# Patient Record
Sex: FEMALE | Race: WHITE | NOT HISPANIC OR LATINO
[De-identification: names, ages, dates, MRNs, and addresses within clinical notes are randomized per-mention and may not be internally consistent; named-entity substitution may affect disease eponyms.]

---

## 2017-01-22 ENCOUNTER — RX RENEWAL (OUTPATIENT)
Age: 72
End: 2017-01-22

## 2017-10-25 ENCOUNTER — MEDICATION RENEWAL (OUTPATIENT)
Age: 72
End: 2017-10-25

## 2018-01-19 ENCOUNTER — APPOINTMENT (OUTPATIENT)
Dept: INTERNAL MEDICINE | Facility: CLINIC | Age: 73
End: 2018-01-19
Payer: MEDICARE

## 2018-01-19 ENCOUNTER — LABORATORY RESULT (OUTPATIENT)
Age: 73
End: 2018-01-19

## 2018-01-19 ENCOUNTER — NON-APPOINTMENT (OUTPATIENT)
Age: 73
End: 2018-01-19

## 2018-01-19 VITALS
TEMPERATURE: 98.4 F | HEART RATE: 86 BPM | HEIGHT: 63 IN | DIASTOLIC BLOOD PRESSURE: 82 MMHG | OXYGEN SATURATION: 91 % | WEIGHT: 222 LBS | SYSTOLIC BLOOD PRESSURE: 133 MMHG | BODY MASS INDEX: 39.34 KG/M2

## 2018-01-19 PROCEDURE — G0439: CPT

## 2018-01-19 PROCEDURE — 99214 OFFICE O/P EST MOD 30 MIN: CPT | Mod: 25

## 2018-01-19 PROCEDURE — 93000 ELECTROCARDIOGRAM COMPLETE: CPT

## 2018-01-19 PROCEDURE — 36415 COLL VENOUS BLD VENIPUNCTURE: CPT

## 2018-01-20 LAB
25(OH)D3 SERPL-MCNC: 35.6 NG/ML
BASOPHILS # BLD AUTO: 0.07 K/UL
BASOPHILS NFR BLD AUTO: 1.1 %
CHOLEST SERPL-MCNC: 188 MG/DL
CHOLEST/HDLC SERPL: 3.4 RATIO
EOSINOPHIL # BLD AUTO: 0.28 K/UL
EOSINOPHIL NFR BLD AUTO: 4.3 %
HBA1C MFR BLD HPLC: 6.1 %
HCT VFR BLD CALC: 42.7 %
HDLC SERPL-MCNC: 55 MG/DL
HGB BLD-MCNC: 13.5 G/DL
IMM GRANULOCYTES NFR BLD AUTO: 0.6 %
LDLC SERPL CALC-MCNC: 103 MG/DL
LYMPHOCYTES # BLD AUTO: 2.03 K/UL
LYMPHOCYTES NFR BLD AUTO: 31 %
MAN DIFF?: NORMAL
MCHC RBC-ENTMCNC: 31.3 PG
MCHC RBC-ENTMCNC: 31.6 GM/DL
MCV RBC AUTO: 99.1 FL
MONOCYTES # BLD AUTO: 0.53 K/UL
MONOCYTES NFR BLD AUTO: 8.1 %
NEUTROPHILS # BLD AUTO: 3.6 K/UL
NEUTROPHILS NFR BLD AUTO: 54.9 %
PLATELET # BLD AUTO: 295 K/UL
RBC # BLD: 4.31 M/UL
RBC # FLD: 14 %
T4 SERPL-MCNC: 9.1 UG/DL
TRIGL SERPL-MCNC: 148 MG/DL
TSH SERPL-ACNC: 2.5 UIU/ML
WBC # FLD AUTO: 6.55 K/UL

## 2018-01-21 LAB
NT-PROBNP SERPL-MCNC: 77 PG/ML
URATE SERPL-MCNC: 7.4 MG/DL

## 2018-07-10 ENCOUNTER — MEDICATION RENEWAL (OUTPATIENT)
Age: 73
End: 2018-07-10

## 2018-07-18 ENCOUNTER — LABORATORY RESULT (OUTPATIENT)
Age: 73
End: 2018-07-18

## 2018-07-18 ENCOUNTER — APPOINTMENT (OUTPATIENT)
Dept: INTERNAL MEDICINE | Facility: CLINIC | Age: 73
End: 2018-07-18
Payer: MEDICARE

## 2018-07-18 VITALS
RESPIRATION RATE: 16 BRPM | OXYGEN SATURATION: 95 % | HEART RATE: 103 BPM | WEIGHT: 221 LBS | TEMPERATURE: 98.6 F | DIASTOLIC BLOOD PRESSURE: 83 MMHG | SYSTOLIC BLOOD PRESSURE: 143 MMHG | HEIGHT: 63 IN | BODY MASS INDEX: 39.16 KG/M2

## 2018-07-18 DIAGNOSIS — M17.0 BILATERAL PRIMARY OSTEOARTHRITIS OF KNEE: ICD-10-CM

## 2018-07-18 PROCEDURE — 99214 OFFICE O/P EST MOD 30 MIN: CPT | Mod: 25

## 2018-07-18 PROCEDURE — 36415 COLL VENOUS BLD VENIPUNCTURE: CPT

## 2018-07-18 NOTE — PLAN
[FreeTextEntry1] : Possible inflammatory arthitis/arthalgia??  prednisone 20 and blood work up\par Possible cellulitis  bactrim\par \par eventual rheum

## 2018-07-18 NOTE — PHYSICAL EXAM
[No Acute Distress] : no acute distress [No JVD] : no jugular venous distention [No Respiratory Distress] : no respiratory distress  [Clear to Auscultation] : lungs were clear to auscultation bilaterally [Normal Rate] : normal rate  [Regular Rhythm] : with a regular rhythm [Soft] : abdomen soft [de-identified] : multiple joints swollen  left ankle swolen and red

## 2018-07-18 NOTE — HISTORY OF PRESENT ILLNESS
[FreeTextEntry1] : Arthalgia [de-identified] : Migratory arthalgia\par legs and ankle swollen left greater than right\par lef ankle red\par minimal help with indocin

## 2018-07-20 LAB
25(OH)D3 SERPL-MCNC: 34.8 NG/ML
B BURGDOR IGG+IGM SER QL IB: NORMAL
BASOPHILS # BLD AUTO: 0.05 K/UL
BASOPHILS NFR BLD AUTO: 0.6 %
CHOLEST SERPL-MCNC: 146 MG/DL
CHOLEST/HDLC SERPL: 2.9 RATIO
CRP SERPL-MCNC: 11.02 MG/DL
EOSINOPHIL # BLD AUTO: 0.3 K/UL
EOSINOPHIL NFR BLD AUTO: 3.8 %
ERYTHROCYTE [SEDIMENTATION RATE] IN BLOOD BY WESTERGREN METHOD: 51 MM/HR
HBA1C MFR BLD HPLC: 6.3 %
HCT VFR BLD CALC: 39.5 %
HDLC SERPL-MCNC: 51 MG/DL
HGB BLD-MCNC: 12.9 G/DL
IMM GRANULOCYTES NFR BLD AUTO: 0.6 %
LDLC SERPL CALC-MCNC: 80 MG/DL
LYMPHOCYTES # BLD AUTO: 1.49 K/UL
LYMPHOCYTES NFR BLD AUTO: 18.8 %
MAN DIFF?: NORMAL
MCHC RBC-ENTMCNC: 30.9 PG
MCHC RBC-ENTMCNC: 32.7 GM/DL
MCV RBC AUTO: 94.7 FL
MONOCYTES # BLD AUTO: 0.8 K/UL
MONOCYTES NFR BLD AUTO: 10.1 %
NEUTROPHILS # BLD AUTO: 5.25 K/UL
NEUTROPHILS NFR BLD AUTO: 66.1 %
PLATELET # BLD AUTO: 339 K/UL
RBC # BLD: 4.17 M/UL
RBC # FLD: 13.3 %
RHEUMATOID FACT SER QL: 76 IU/ML
T4 SERPL-MCNC: 9.7 UG/DL
TRIGL SERPL-MCNC: 76 MG/DL
TSH SERPL-ACNC: 2.98 UIU/ML
URATE SERPL-MCNC: 6.2 MG/DL
WBC # FLD AUTO: 7.94 K/UL

## 2018-07-23 LAB
DSDNA AB SER-ACNC: 53 IU/ML
HLA-B27 RELATED AG QL: NORMAL

## 2018-07-25 LAB
ANA PAT FLD IF-IMP: ABNORMAL
ANA PATTERN: ABNORMAL
ANA SER IF-ACNC: ABNORMAL
ANA TITER: ABNORMAL

## 2018-08-03 ENCOUNTER — APPOINTMENT (OUTPATIENT)
Dept: RHEUMATOLOGY | Facility: CLINIC | Age: 73
End: 2018-08-03
Payer: MEDICARE

## 2018-08-03 VITALS
HEIGHT: 63 IN | WEIGHT: 214 LBS | OXYGEN SATURATION: 95 % | SYSTOLIC BLOOD PRESSURE: 135 MMHG | BODY MASS INDEX: 37.92 KG/M2 | HEART RATE: 83 BPM | DIASTOLIC BLOOD PRESSURE: 80 MMHG | TEMPERATURE: 98.3 F

## 2018-08-03 LAB
DEPRECATED KAPPA LC FREE/LAMBDA SER: 1.3 RATIO
ERYTHROCYTE [SEDIMENTATION RATE] IN BLOOD BY WESTERGREN METHOD: 33 MM/HR
IGA SER QL IEP: 355 MG/DL
IGG SER QL IEP: 1176 MG/DL
IGM SER QL IEP: 84 MG/DL
KAPPA LC CSF-MCNC: 1.51 MG/DL
KAPPA LC SERPL-MCNC: 1.97 MG/DL

## 2018-08-03 PROCEDURE — 99205 OFFICE O/P NEW HI 60 MIN: CPT

## 2018-08-04 LAB
CK SERPL-CCNC: 84 U/L
ENA RNP AB SER IA-ACNC: 0.2 AL
ENA SM AB SER IA-ACNC: <0.2 AL
ENA SS-A AB SER IA-ACNC: <0.2 AL
ENA SS-B AB SER IA-ACNC: <0.2 AL
M PROTEIN SPEC IFE-MCNC: NORMAL

## 2018-08-06 LAB
CCP AB SER IA-ACNC: >250 UNITS
RF+CCP IGG SER-IMP: ABNORMAL

## 2018-08-07 LAB
B19V IGG SER QL IA: 5.6 INDEX
B19V IGG+IGM SER-IMP: NORMAL
B19V IGG+IGM SER-IMP: POSITIVE
B19V IGM FLD-ACNC: 0.1 INDEX
B19V IGM SER-ACNC: NEGATIVE

## 2018-08-13 ENCOUNTER — APPOINTMENT (OUTPATIENT)
Dept: RHEUMATOLOGY | Facility: CLINIC | Age: 73
End: 2018-08-13
Payer: MEDICARE

## 2018-08-13 VITALS
SYSTOLIC BLOOD PRESSURE: 124 MMHG | OXYGEN SATURATION: 96 % | RESPIRATION RATE: 16 BRPM | HEART RATE: 111 BPM | DIASTOLIC BLOOD PRESSURE: 80 MMHG | BODY MASS INDEX: 38.27 KG/M2 | WEIGHT: 216 LBS | HEIGHT: 63 IN

## 2018-08-13 PROCEDURE — 99214 OFFICE O/P EST MOD 30 MIN: CPT

## 2018-08-13 RX ORDER — INDOMETHACIN 25 MG/1
25 CAPSULE ORAL 3 TIMES DAILY
Qty: 30 | Refills: 0 | Status: DISCONTINUED | COMMUNITY
Start: 2018-07-10 | End: 2018-08-13

## 2018-08-13 RX ORDER — SULFAMETHOXAZOLE AND TRIMETHOPRIM 800; 160 MG/1; MG/1
800-160 TABLET ORAL
Qty: 14 | Refills: 1 | Status: DISCONTINUED | COMMUNITY
Start: 2018-07-18 | End: 2018-08-13

## 2018-08-13 RX ORDER — PREDNISONE 20 MG/1
20 TABLET ORAL DAILY
Qty: 14 | Refills: 0 | Status: DISCONTINUED | COMMUNITY
Start: 2018-07-18 | End: 2018-08-13

## 2018-08-21 ENCOUNTER — MEDICATION RENEWAL (OUTPATIENT)
Age: 73
End: 2018-08-21

## 2018-09-11 ENCOUNTER — APPOINTMENT (OUTPATIENT)
Dept: RHEUMATOLOGY | Facility: CLINIC | Age: 73
End: 2018-09-11
Payer: MEDICARE

## 2018-09-11 VITALS
HEIGHT: 63 IN | OXYGEN SATURATION: 95 % | HEART RATE: 134 BPM | RESPIRATION RATE: 17 BRPM | BODY MASS INDEX: 38.27 KG/M2 | SYSTOLIC BLOOD PRESSURE: 135 MMHG | WEIGHT: 216 LBS | DIASTOLIC BLOOD PRESSURE: 81 MMHG

## 2018-09-11 LAB
BASOPHILS # BLD AUTO: 0.03 K/UL
BASOPHILS NFR BLD AUTO: 0.3 %
EOSINOPHIL # BLD AUTO: 0.08 K/UL
EOSINOPHIL NFR BLD AUTO: 0.8 %
HCT VFR BLD CALC: 42.3 %
HGB BLD-MCNC: 13.8 G/DL
IMM GRANULOCYTES NFR BLD AUTO: 0.7 %
LYMPHOCYTES # BLD AUTO: 1.49 K/UL
LYMPHOCYTES NFR BLD AUTO: 15.5 %
MAN DIFF?: NORMAL
MCHC RBC-ENTMCNC: 31.4 PG
MCHC RBC-ENTMCNC: 32.6 GM/DL
MCV RBC AUTO: 96.4 FL
MONOCYTES # BLD AUTO: 0.51 K/UL
MONOCYTES NFR BLD AUTO: 5.3 %
NEUTROPHILS # BLD AUTO: 7.44 K/UL
NEUTROPHILS NFR BLD AUTO: 77.4 %
PLATELET # BLD AUTO: 290 K/UL
RBC # BLD: 4.39 M/UL
RBC # FLD: 16.3 %
WBC # FLD AUTO: 9.62 K/UL

## 2018-09-11 PROCEDURE — 99214 OFFICE O/P EST MOD 30 MIN: CPT

## 2018-09-12 LAB
ALBUMIN SERPL ELPH-MCNC: 4 G/DL
ALP BLD-CCNC: 97 U/L
ALT SERPL-CCNC: 16 U/L
ANION GAP SERPL CALC-SCNC: 17 MMOL/L
AST SERPL-CCNC: 13 U/L
BILIRUB SERPL-MCNC: 0.3 MG/DL
BUN SERPL-MCNC: 20 MG/DL
CALCIUM SERPL-MCNC: 9.8 MG/DL
CHLORIDE SERPL-SCNC: 101 MMOL/L
CO2 SERPL-SCNC: 26 MMOL/L
CREAT SERPL-MCNC: 0.57 MG/DL
CRP SERPL-MCNC: 0.72 MG/DL
ERYTHROCYTE [SEDIMENTATION RATE] IN BLOOD BY WESTERGREN METHOD: 29 MM/HR
GLUCOSE SERPL-MCNC: 118 MG/DL
POTASSIUM SERPL-SCNC: 4.2 MMOL/L
PROT SERPL-MCNC: 7.2 G/DL
SODIUM SERPL-SCNC: 144 MMOL/L

## 2018-10-15 ENCOUNTER — APPOINTMENT (OUTPATIENT)
Dept: RHEUMATOLOGY | Facility: CLINIC | Age: 73
End: 2018-10-15
Payer: MEDICARE

## 2018-10-15 VITALS
HEART RATE: 105 BPM | DIASTOLIC BLOOD PRESSURE: 88 MMHG | SYSTOLIC BLOOD PRESSURE: 133 MMHG | WEIGHT: 216 LBS | HEIGHT: 63 IN | RESPIRATION RATE: 14 BRPM | OXYGEN SATURATION: 98 % | BODY MASS INDEX: 38.27 KG/M2

## 2018-10-15 PROCEDURE — 99214 OFFICE O/P EST MOD 30 MIN: CPT

## 2018-10-16 LAB
ALBUMIN SERPL ELPH-MCNC: 4.2 G/DL
ALP BLD-CCNC: 83 U/L
ALT SERPL-CCNC: 17 U/L
ANION GAP SERPL CALC-SCNC: 12 MMOL/L
AST SERPL-CCNC: 17 U/L
BASOPHILS # BLD AUTO: 0.04 K/UL
BASOPHILS NFR BLD AUTO: 0.6 %
BILIRUB SERPL-MCNC: 0.3 MG/DL
BUN SERPL-MCNC: 15 MG/DL
CALCIUM SERPL-MCNC: 10.2 MG/DL
CHLORIDE SERPL-SCNC: 101 MMOL/L
CO2 SERPL-SCNC: 28 MMOL/L
CREAT SERPL-MCNC: 0.61 MG/DL
CRP SERPL-MCNC: 1.38 MG/DL
EOSINOPHIL # BLD AUTO: 0.14 K/UL
EOSINOPHIL NFR BLD AUTO: 2 %
GLUCOSE SERPL-MCNC: 110 MG/DL
HCT VFR BLD CALC: 40.7 %
HGB BLD-MCNC: 13.4 G/DL
IMM GRANULOCYTES NFR BLD AUTO: 1 %
LYMPHOCYTES # BLD AUTO: 1.21 K/UL
LYMPHOCYTES NFR BLD AUTO: 17.3 %
MAN DIFF?: NORMAL
MCHC RBC-ENTMCNC: 31.6 PG
MCHC RBC-ENTMCNC: 32.9 GM/DL
MCV RBC AUTO: 96 FL
MONOCYTES # BLD AUTO: 0.47 K/UL
MONOCYTES NFR BLD AUTO: 6.7 %
NEUTROPHILS # BLD AUTO: 5.07 K/UL
NEUTROPHILS NFR BLD AUTO: 72.4 %
PLATELET # BLD AUTO: 276 K/UL
POTASSIUM SERPL-SCNC: 4.4 MMOL/L
PROT SERPL-MCNC: 7.2 G/DL
RBC # BLD: 4.24 M/UL
RBC # FLD: 16.3 %
SODIUM SERPL-SCNC: 141 MMOL/L
WBC # FLD AUTO: 7 K/UL

## 2018-11-02 ENCOUNTER — MEDICATION RENEWAL (OUTPATIENT)
Age: 73
End: 2018-11-02

## 2018-12-18 ENCOUNTER — APPOINTMENT (OUTPATIENT)
Dept: RHEUMATOLOGY | Facility: CLINIC | Age: 73
End: 2018-12-18
Payer: MEDICARE

## 2018-12-18 VITALS
OXYGEN SATURATION: 96 % | TEMPERATURE: 98 F | SYSTOLIC BLOOD PRESSURE: 122 MMHG | BODY MASS INDEX: 38.8 KG/M2 | HEIGHT: 63 IN | WEIGHT: 219 LBS | DIASTOLIC BLOOD PRESSURE: 79 MMHG | HEART RATE: 101 BPM

## 2018-12-18 PROCEDURE — 99214 OFFICE O/P EST MOD 30 MIN: CPT

## 2018-12-19 LAB
ALBUMIN SERPL ELPH-MCNC: 4.1 G/DL
ALP BLD-CCNC: 79 U/L
ALT SERPL-CCNC: 9 U/L
ANION GAP SERPL CALC-SCNC: 13 MMOL/L
AST SERPL-CCNC: 16 U/L
BASOPHILS # BLD AUTO: 0.03 K/UL
BASOPHILS NFR BLD AUTO: 0.4 %
BILIRUB SERPL-MCNC: 0.3 MG/DL
BUN SERPL-MCNC: 23 MG/DL
CALCIUM SERPL-MCNC: 9.9 MG/DL
CHLORIDE SERPL-SCNC: 101 MMOL/L
CO2 SERPL-SCNC: 26 MMOL/L
CREAT SERPL-MCNC: 0.67 MG/DL
CRP SERPL-MCNC: 1.76 MG/DL
EOSINOPHIL # BLD AUTO: 0.15 K/UL
EOSINOPHIL NFR BLD AUTO: 1.8 %
ERYTHROCYTE [SEDIMENTATION RATE] IN BLOOD BY WESTERGREN METHOD: 34 MM/HR
GLUCOSE SERPL-MCNC: 96 MG/DL
HCT VFR BLD CALC: 40.5 %
HGB BLD-MCNC: 12.7 G/DL
IMM GRANULOCYTES NFR BLD AUTO: 0.7 %
LYMPHOCYTES # BLD AUTO: 1.58 K/UL
LYMPHOCYTES NFR BLD AUTO: 19.2 %
MAN DIFF?: NORMAL
MCHC RBC-ENTMCNC: 31.4 GM/DL
MCHC RBC-ENTMCNC: 31.4 PG
MCV RBC AUTO: 100.2 FL
MONOCYTES # BLD AUTO: 0.64 K/UL
MONOCYTES NFR BLD AUTO: 7.8 %
NEUTROPHILS # BLD AUTO: 5.76 K/UL
NEUTROPHILS NFR BLD AUTO: 70.1 %
PLATELET # BLD AUTO: 331 K/UL
POTASSIUM SERPL-SCNC: 3.7 MMOL/L
PROT SERPL-MCNC: 7.6 G/DL
RBC # BLD: 4.04 M/UL
RBC # FLD: 14.5 %
SODIUM SERPL-SCNC: 140 MMOL/L
WBC # FLD AUTO: 8.22 K/UL

## 2019-01-14 ENCOUNTER — RX RENEWAL (OUTPATIENT)
Age: 74
End: 2019-01-14

## 2019-02-04 ENCOUNTER — EMERGENCY (EMERGENCY)
Facility: HOSPITAL | Age: 74
LOS: 1 days | Discharge: HOME | End: 2019-02-04
Attending: EMERGENCY MEDICINE | Admitting: EMERGENCY MEDICINE
Payer: MEDICARE

## 2019-02-04 ENCOUNTER — APPOINTMENT (OUTPATIENT)
Dept: RHEUMATOLOGY | Facility: CLINIC | Age: 74
End: 2019-02-04
Payer: MEDICARE

## 2019-02-04 VITALS — DIASTOLIC BLOOD PRESSURE: 85 MMHG | HEART RATE: 143 BPM | SYSTOLIC BLOOD PRESSURE: 137 MMHG | RESPIRATION RATE: 16 BRPM

## 2019-02-04 VITALS
TEMPERATURE: 98 F | SYSTOLIC BLOOD PRESSURE: 150 MMHG | OXYGEN SATURATION: 99 % | DIASTOLIC BLOOD PRESSURE: 84 MMHG | RESPIRATION RATE: 16 BRPM | HEART RATE: 142 BPM

## 2019-02-04 VITALS — OXYGEN SATURATION: 94 %

## 2019-02-04 DIAGNOSIS — L65.9 NONSCARRING HAIR LOSS, UNSPECIFIED: ICD-10-CM

## 2019-02-04 LAB — TROPONIN T, HIGH SENSITIVITY: 10 NG/L — SIGNIFICANT CHANGE UP (ref ?–14)

## 2019-02-04 PROCEDURE — 93000 ELECTROCARDIOGRAM COMPLETE: CPT

## 2019-02-04 PROCEDURE — 99215 OFFICE O/P EST HI 40 MIN: CPT | Mod: 25

## 2019-02-04 PROCEDURE — 99285 EMERGENCY DEPT VISIT HI MDM: CPT | Mod: 25,GC

## 2019-02-04 PROCEDURE — 93010 ELECTROCARDIOGRAM REPORT: CPT

## 2019-02-04 RX ORDER — ASPIRIN/CALCIUM CARB/MAGNESIUM 324 MG
325 TABLET ORAL ONCE
Qty: 0 | Refills: 0 | Status: COMPLETED | OUTPATIENT
Start: 2019-02-04 | End: 2019-02-04

## 2019-02-04 RX ORDER — ACETAMINOPHEN 500 MG
650 TABLET ORAL ONCE
Qty: 0 | Refills: 0 | Status: COMPLETED | OUTPATIENT
Start: 2019-02-04 | End: 2019-02-04

## 2019-02-04 RX ORDER — SODIUM CHLORIDE 9 MG/ML
2000 INJECTION INTRAMUSCULAR; INTRAVENOUS; SUBCUTANEOUS ONCE
Qty: 0 | Refills: 0 | Status: COMPLETED | OUTPATIENT
Start: 2019-02-04 | End: 2019-02-04

## 2019-02-04 RX ADMIN — SODIUM CHLORIDE 2000 MILLILITER(S): 9 INJECTION INTRAMUSCULAR; INTRAVENOUS; SUBCUTANEOUS at 18:44

## 2019-02-04 RX ADMIN — Medication 325 MILLIGRAM(S): at 19:59

## 2019-02-04 RX ADMIN — Medication 650 MILLIGRAM(S): at 18:44

## 2019-02-04 NOTE — ED PROVIDER NOTE - CARE PROVIDER_API CALL
Dagmar García (DO)  Cardiology; Internal Medicine  [address], [address]  [place] [address]  Phone: [phone]  Fax: [phone]

## 2019-02-04 NOTE — ED ADULT NURSE NOTE - OBJECTIVE STATEMENT
break coverage: pt received to room #5, sent by MD for elevated HR.  pt on HM noted to be tachycardic. pt denies cp, sob, palpations, dizziness, n/v/d. states she has felt more fatigued usual and thought it was her RA medication. pt aox4, ambulatory. IV placed, labs drawn and sent. rectal temp obtained and noted. pt seen by MD, will cont to monitor.

## 2019-02-04 NOTE — ED PROVIDER NOTE - OBJECTIVE STATEMENT
73F w/PMH RA (recently started on MTX), OA, gout, htn p/w tachycardia from OP rheumatologist's office (Dr. Santa). Patient was there on routine visit but was complaining of hair loss xa few weeks, concerned was in setting of MTX increase. Patient was started on 4mg MTX Sept 2018, inc to 6mg Dec 2018. Also prednisone was initially 2.5mg BID and 2mo ago dec to 2.5mg daily. No other med changes. Patient denies prior cardiac hx or issue with tachycardia. +dry skin x a few months. +baseline mild anxiety. +baseline rash to b/l LE and edema, unchanged. Denies dizziness, palp, syncope, headache, f/c, URI sx, cp/sob/cough, abd pain, n/v/d/c, urinary sx, melena/BRBPR, other recent med changes, travel/immobilization, prior DVT/PE/coagulopathy, drug use.    PMD: Dr. Hudson Ochoa

## 2019-02-04 NOTE — ED PROVIDER NOTE - ATTENDING CONTRIBUTION TO CARE
AJM: Patient seen with resident and agree with above note. 73F w/PMH RA (recently started on MTX), OA, gout, htn p/w tachycardia from OP rheumatologist's office (Dr. Santa). Patient was there on routine visit but was complaining of hair loss xa few weeks, concerned was in setting of MTX increase. Patient was started on 4mg MTX Sept 2018, inc to 6mg Dec 2018. Also prednisone was initially 2.5mg BID and 2mo ago dec to 2.5mg daily. No other med changes. Patient denies prior cardiac hx or issue with tachycardia. +dry skin x a few months. +baseline mild anxiety. +baseline rash to b/l LE and edema, unchanged. Denies dizziness, palp, syncope, headache, f/c, URI sx, cp/sob/cough, abd pain, n/v/d/c, urinary sx, melena/BRBPR, other recent med changes, travel/immobilization, prior DVT/PE/coagulopathy, drug use. On exam + tachycardia but otherwise unremarkable. ECG shows likely sinus tach vs AT vs aflutter 2:1. will give fluids, rectal temp, labs, trop, AJM: Patient seen with resident and agree with above note. 73F w/PMH RA (recently started on MTX), OA, gout, htn p/w tachycardia from OP rheumatologist's office (Dr. Santa). Patient was there on routine visit but was complaining of hair loss xa few weeks, concerned was in setting of MTX increase. Patient was started on 4mg MTX Sept 2018, inc to 6mg Dec 2018. Also prednisone was initially 2.5mg BID and 2mo ago dec to 2.5mg daily. No other med changes. Patient denies prior cardiac hx or issue with tachycardia. +dry skin x a few months. +baseline mild anxiety. +baseline rash to b/l LE and edema, unchanged. Denies dizziness, palp, syncope, headache, f/c, URI sx, cp/sob/cough, abd pain, n/v/d/c, urinary sx, melena/BRBPR, other recent med changes, travel/immobilization, prior DVT/PE/coagulopathy, drug use. On exam + tachycardia but otherwise unremarkable. ECG shows likely sinus tach vs AT vs aflutter 2:1. will give fluids, rectal temp, labs, trop, consider meds to slow rate. if workup neg and HR normal will dc home

## 2019-02-04 NOTE — PHYSICAL EXAM
[General Appearance - In No Acute Distress] : in no acute distress [Sclera] : the sclera and conjunctiva were normal [Extraocular Movements] : extraocular movements were intact [Outer Ear] : the ears and nose were normal in appearance [Oropharynx] : the oropharynx was normal [Neck Appearance] : the appearance of the neck was normal [Neck Cervical Mass (___cm)] : no neck mass was observed [Jugular Venous Distention Increased] : there was no jugular-venous distention [Thyroid Diffuse Enlargement] : the thyroid was not enlarged [Thyroid Nodule] : there were no palpable thyroid nodules [Auscultation Breath Sounds / Voice Sounds] : lungs were clear to auscultation bilaterally [Heart Rate And Rhythm] : heart rate was normal and rhythm regular [Heart Sounds] : normal S1 and S2 [Heart Sounds Gallop] : no gallops [Murmurs] : no murmurs [Heart Sounds Pericardial Friction Rub] : no pericardial rub [Bowel Sounds] : normal bowel sounds [Abdomen Soft] : soft [Abdomen Tenderness] : non-tender [] : no hepato-splenomegaly [Abdomen Mass (___ Cm)] : no abdominal mass palpated [Cervical Lymph Nodes Enlarged Posterior Bilaterally] : posterior cervical [Cervical Lymph Nodes Enlarged Anterior Bilaterally] : anterior cervical [Supraclavicular Lymph Nodes Enlarged Bilaterally] : supraclavicular [No CVA Tenderness] : no ~M costovertebral angle tenderness [No Spinal Tenderness] : no spinal tenderness [Sensation] : the sensory exam was normal to light touch and pinprick [Motor Exam] : the motor exam was normal [Oriented To Time, Place, And Person] : oriented to person, place, and time [Impaired Insight] : insight and judgment were intact [Affect] : the affect was normal [FreeTextEntry1] : facial erythema in a malar distribution and above the brows

## 2019-02-04 NOTE — HISTORY OF PRESENT ILLNESS
[FreeTextEntry1] : 1. Rheumatoid Arthritis:  Chronic low-grade stiffness in the joints, but in mid-July 2018 she had the acute onset of polyarthritis accompanied by erythema of the LLE.  She was given prednisone, Indocin, and Bactrim for arthritis/cellulitis.  She tapered the prednisone but did not stop it.  No fever, ocular disease, bowel symptoms.  Labs notable for autoAb (RF, DNA, MILANA). Her mother had RA. Labs from her initial visit with me included a CCP > 250.  She was given prednisone 5 mg/d without much response until she added Tylenol.  The dose was increased to 7.5 mg/d still without much response. On 10 mg/d along with methotrexate 10 mg/wk, she did much better.  Swelling of the hands persisted along with stiffness and weakness.  However, pain has not been an issue. The methotrexate was increased to 15 mg/wk in late 2018, but she developed hair loss.  Lowering the methotrexate dose was agreed upon.  \par 2. Knee OA\par 3. Chronic edema\par 4.  Fatigue: profound\par 5.  Tachycardia:  an EKG performed 2/4/19 demonstrated an SVT (appeared to be atrial flutter with 2:1 conduction).  EKG's in this chart previously demonstrated NSR at 70.  Despite a lack of symptoms, she was advised to go to the [facility] ER for further evaluation. \par \par Meds\par losartan/HCTZ\par  mg/d\par prednisone 2.5 mg AM \par methotrexate 15 mg/wk

## 2019-02-04 NOTE — ED PROVIDER NOTE - NSFOLLOWUPINSTRUCTIONS_ED_ALL_ED_FT
Please follow up with your Primary MD in 24-48 hr.  Seek immediate medical care for any new/worsening signs or symptoms.  Take aspirin 81mg daily  Drink plenty of fluids.  Follow up with Dr. García [phone]

## 2019-02-04 NOTE — ED PROVIDER NOTE - PROGRESS NOTE DETAILS
Reginaldo Love MD, PGY3: Patient received at resident sign out. HR high 90s. Repeat EKG with sinus tachycardia 103. Dimer added to labs. Pending repeat trop. Reginaldo Love MD, PGY3: CTA negative. Discussed with Dr. García and patient can follow up as an outpatient. Encouraged increased hydration at home and to start 81mg aspirin. Patient informed of ED visit findings, understands plan.  Patient provided with written and further verbal instructions not included in discharge paperwork.  Patient instructed to follow up with their primary care physician in 2-3 days and return for new, worsened, or persistent symptoms. Reginaldo Love MD, PGY3: CTA negative. Discussed with Dr. García and patient can follow up as an outpatient. Encouraged increased hydration at home and to start 81mg aspirin. Patient informed of ED visit findings, understands plan.  Patient provided with written and further verbal instructions not included in discharge paperwork.  Patient instructed to follow up with their primary care physician in 2-3 days and return for new, worsened, or persistent symptoms. Discussed adrenal nodule and outpatient follow up

## 2019-02-04 NOTE — ED PROVIDER NOTE - SKIN, MLM
Skin normal color for race, warm, dry and intact. +petechial rash scattered/slight over b/l LE at ankle line

## 2019-02-04 NOTE — ED ADULT TRIAGE NOTE - CHIEF COMPLAINT QUOTE
Pt sent by rheumatologist for evaluation of possible heart arrhythmia.  Pt tachycardic in triage, denies chest pain/shortness of breath/palpitations.

## 2019-02-04 NOTE — ED PROVIDER NOTE - CARDIAC, MLM
tachycardic rate, regular rhythm.  Heart sounds S1, S2.  No murmurs, rubs or gallops. +1 b/l LE edema

## 2019-02-04 NOTE — ED PROVIDER NOTE - NS ED ROS FT
No fever, no chills, no change in vision, no throat pain, no chest pain, no abdominal pain, no joint pain, no focal neurologic complaints,  all ROS otherwise as per HPI or negative.

## 2019-02-04 NOTE — ASSESSMENT
[FreeTextEntry1] : 1. Rheumatoid Arthritis:  Chronic low-grade stiffness in the joints, but in mid-July 2018 she had the acute onset of polyarthritis accompanied by erythema of the LLE.  She was given prednisone, Indocin, and Bactrim for arthritis/cellulitis.  She tapered the prednisone but did not stop it.  No fever, ocular disease, bowel symptoms.  Labs notable for autoAb (RF, DNA, MILANA). Her mother had RA. Labs from her initial visit with me included a CCP > 250.  She was given prednisone 5 mg/d without much response until she added Tylenol.  The dose was increased to 7.5 mg/d still without much response. On 10 mg/d along with methotrexate 10 mg/wk, she did much better.  Swelling of the hands persisted along with stiffness and weakness.  However, pain has not been an issue. The methotrexate was increased to 15 mg/wk in late 2018, but she developed hair loss.  Lowering the methotrexate dose was agreed upon.  \par 2. Knee OA\par 3. Chronic edema\par 4.  Fatigue: profound\par 5.  Tachycardia:  an EKG performed 2/4/19 demonstrated an SVT (appeared to be atrial flutter with 2:1 conduction).  EKG's in this chart previously demonstrated NSR at 70.  Despite a lack of symptoms, she was advised to go to the [facility] ER for further evaluation. \par \par Plan:\par 1.  Lab tests\par 2.  Continue prednisone 2.5 mg am  \par 3.  Reduce methotrexate 10 mg/wk \par 4.  In the future, physical therapy for the knees\par 5.  EKG:  to my read:  Atrial flutter with 2:1 conduction \par 6.  To ER at [facility]\par 5.  Refused vaccines\par

## 2019-02-04 NOTE — ED PROVIDER NOTE - MEDICAL DECISION MAKING DETAILS
[place]: 73F w/PMH RA, OA, gout, htn p/w tachycardia. Appears sinus - r/o febrile illness, ACS/arrhythmia, anemia, hyperthyroid, dehydration; no e/o toxidrome and below testing threshold for PE at this time. Labs, EKG/tele, CXR, IVF, reassess.

## 2019-02-05 ENCOUNTER — MEDICATION RENEWAL (OUTPATIENT)
Age: 74
End: 2019-02-05

## 2019-02-05 VITALS
RESPIRATION RATE: 16 BRPM | DIASTOLIC BLOOD PRESSURE: 70 MMHG | HEART RATE: 90 BPM | SYSTOLIC BLOOD PRESSURE: 136 MMHG | OXYGEN SATURATION: 97 %

## 2019-02-05 DIAGNOSIS — R00.0 TACHYCARDIA, UNSPECIFIED: ICD-10-CM

## 2019-02-05 LAB
ALBUMIN SERPL ELPH-MCNC: 4.1 G/DL
ALP BLD-CCNC: 88 U/L
ALT SERPL-CCNC: 11 U/L
ANION GAP SERPL CALC-SCNC: 14 MMOL/L
AST SERPL-CCNC: 14 U/L
BASOPHILS # BLD AUTO: 0.03 K/UL
BASOPHILS NFR BLD AUTO: 0.4 %
BILIRUB SERPL-MCNC: 0.2 MG/DL
BUN SERPL-MCNC: 20 MG/DL
CALCIUM SERPL-MCNC: 9.9 MG/DL
CHLORIDE SERPL-SCNC: 102 MMOL/L
CO2 SERPL-SCNC: 27 MMOL/L
CREAT SERPL-MCNC: 0.68 MG/DL
CRP SERPL-MCNC: 1.68 MG/DL
EOSINOPHIL # BLD AUTO: 0.23 K/UL
EOSINOPHIL NFR BLD AUTO: 2.8 %
GLUCOSE SERPL-MCNC: 147 MG/DL
HCT VFR BLD CALC: 41.8 %
HGB BLD-MCNC: 12.5 G/DL
IMM GRANULOCYTES NFR BLD AUTO: 0.8 %
LYMPHOCYTES # BLD AUTO: 1.7 K/UL
LYMPHOCYTES NFR BLD AUTO: 20.6 %
MAN DIFF?: NORMAL
MCHC RBC-ENTMCNC: 29.9 GM/DL
MCHC RBC-ENTMCNC: 30.6 PG
MCV RBC AUTO: 102.2 FL
MONOCYTES # BLD AUTO: 0.64 K/UL
MONOCYTES NFR BLD AUTO: 7.7 %
NEUTROPHILS # BLD AUTO: 5.6 K/UL
NEUTROPHILS NFR BLD AUTO: 67.7 %
PLATELET # BLD AUTO: 335 K/UL
POTASSIUM SERPL-SCNC: 3.9 MMOL/L
PROT SERPL-MCNC: 7 G/DL
RBC # BLD: 4.09 M/UL
RBC # FLD: 15.4 %
SODIUM SERPL-SCNC: 143 MMOL/L
TSH SERPL-ACNC: 1.73 UIU/ML
WBC # FLD AUTO: 8.27 K/UL

## 2019-02-05 PROCEDURE — 71275 CT ANGIOGRAPHY CHEST: CPT | Mod: 26

## 2019-02-07 ENCOUNTER — APPOINTMENT (OUTPATIENT)
Dept: INTERNAL MEDICINE | Facility: CLINIC | Age: 74
End: 2019-02-07
Payer: MEDICARE

## 2019-02-07 VITALS
BODY MASS INDEX: 40.74 KG/M2 | OXYGEN SATURATION: 93 % | HEART RATE: 102 BPM | SYSTOLIC BLOOD PRESSURE: 134 MMHG | WEIGHT: 230 LBS | DIASTOLIC BLOOD PRESSURE: 82 MMHG | TEMPERATURE: 98.5 F

## 2019-02-07 PROCEDURE — 99213 OFFICE O/P EST LOW 20 MIN: CPT

## 2019-02-07 NOTE — PLAN
[FreeTextEntry1] : Heart still a little fast\par seeing cardiologist\par 5 day holter in place\par echo done\par cst/thal pending

## 2019-02-07 NOTE — HISTORY OF PRESENT ILLNESS
[FreeTextEntry1] : Recent er visit for tachycardia [de-identified] : REcent er visit for sinus tachycardia\par feel well\par had eaten and no unusual event or caffeine

## 2019-02-07 NOTE — PHYSICAL EXAM
[No Acute Distress] : no acute distress [Well Nourished] : well nourished [Normal Outer Ear/Nose] : the outer ears and nose were normal in appearance [Normal Oropharynx] : the oropharynx was normal [No JVD] : no jugular venous distention [Supple] : supple [No Respiratory Distress] : no respiratory distress  [Clear to Auscultation] : lungs were clear to auscultation bilaterally [Normal Rate] : normal rate  [Regular Rhythm] : with a regular rhythm [de-identified] : rate 102

## 2019-02-07 NOTE — REVIEW OF SYSTEMS
[Discharge] : no discharge [Vision Problems] : no vision problems [Chest Pain] : no chest pain [Orthopnea] : no orthopnea [Shortness Of Breath] : no shortness of breath [Wheezing] : no wheezing

## 2019-03-19 ENCOUNTER — APPOINTMENT (OUTPATIENT)
Dept: RHEUMATOLOGY | Facility: CLINIC | Age: 74
End: 2019-03-19
Payer: MEDICARE

## 2019-03-19 VITALS
OXYGEN SATURATION: 96 % | WEIGHT: 224 LBS | DIASTOLIC BLOOD PRESSURE: 83 MMHG | BODY MASS INDEX: 42.29 KG/M2 | SYSTOLIC BLOOD PRESSURE: 132 MMHG | HEART RATE: 101 BPM | TEMPERATURE: 98.4 F | HEIGHT: 61 IN

## 2019-03-19 DIAGNOSIS — M17.10 UNILATERAL PRIMARY OSTEOARTHRITIS, UNSPECIFIED KNEE: ICD-10-CM

## 2019-03-19 PROCEDURE — 99214 OFFICE O/P EST MOD 30 MIN: CPT

## 2019-03-19 NOTE — HISTORY OF PRESENT ILLNESS
[FreeTextEntry1] : 1. Rheumatoid Arthritis:  Chronic low-grade stiffness in the joints, but in mid-July 2018 she had the acute onset of polyarthritis accompanied by erythema of the LLE.  She was given prednisone, Indocin, and Bactrim for arthritis/cellulitis.  She tapered the prednisone but did not stop it.  No fever, ocular disease, bowel symptoms.  Labs notable for autoAb (RF, DNA, MILANA). Her mother had RA. Labs from her initial visit with me included a CCP > 250.  She was given prednisone 5 mg/d without much response until she added Tylenol.  The dose was increased to 7.5 mg/d still without much response. On 10 mg/d along with methotrexate 10 mg/wk, she did much better.  Swelling of the hands persisted along with stiffness and weakness.  However, pain has not been an issue. The methotrexate was increased to 15 mg/wk in late 2018, but she developed hair loss.  Lowering the methotrexate dose was agreed upon.  She stopped methotrexate in mid-March 2019 because of alopecia.  Her RA flared tremendously around this time with severe pain and swelling in the hands/wrists. A discussion took place about alternative therapies (hydroxychloroquine vs biologics-toxicities explained). \par 2. Knee OA\par 3. Chronic edema\par 4.  Fatigue: profound\par 5.  Tachycardia:  an EKG performed 2/4/19 demonstrated an SVT (appeared to be atrial flutter with 2:1 conduction).  EKG's in this chart previously demonstrated NSR at 70.  Despite a lack of symptoms, she was advised to go to the [facility] ER for further evaluation. She was diagnosed with AF and was placed on metoprolol and apixaban.\par \par Meds\par losartan/HCTZ\par ASA 81 mg/d\par prednisone 2.5 mg AM stopped\par metoprolol\par apixaban

## 2019-03-19 NOTE — PHYSICAL EXAM
[General Appearance - In No Acute Distress] : in no acute distress [Sclera] : the sclera and conjunctiva were normal [Extraocular Movements] : extraocular movements were intact [Outer Ear] : the ears and nose were normal in appearance [Oropharynx] : the oropharynx was normal [Neck Appearance] : the appearance of the neck was normal [Neck Cervical Mass (___cm)] : no neck mass was observed [Jugular Venous Distention Increased] : there was no jugular-venous distention [Thyroid Diffuse Enlargement] : the thyroid was not enlarged [Thyroid Nodule] : there were no palpable thyroid nodules [Auscultation Breath Sounds / Voice Sounds] : lungs were clear to auscultation bilaterally [Heart Rate And Rhythm] : heart rate was normal and rhythm regular [Heart Sounds] : normal S1 and S2 [Heart Sounds Gallop] : no gallops [Murmurs] : no murmurs [Heart Sounds Pericardial Friction Rub] : no pericardial rub [Bowel Sounds] : normal bowel sounds [Abdomen Soft] : soft [Abdomen Tenderness] : non-tender [] : no hepato-splenomegaly [Cervical Lymph Nodes Enlarged Posterior Bilaterally] : posterior cervical [Abdomen Mass (___ Cm)] : no abdominal mass palpated [Cervical Lymph Nodes Enlarged Anterior Bilaterally] : anterior cervical [Supraclavicular Lymph Nodes Enlarged Bilaterally] : supraclavicular [No CVA Tenderness] : no ~M costovertebral angle tenderness [No Spinal Tenderness] : no spinal tenderness [Sensation] : the sensory exam was normal to light touch and pinprick [Oriented To Time, Place, And Person] : oriented to person, place, and time [Motor Exam] : the motor exam was normal [Affect] : the affect was normal [Impaired Insight] : insight and judgment were intact [FreeTextEntry1] : facial erythema in a malar distribution and above the brows

## 2019-03-19 NOTE — ASSESSMENT
[FreeTextEntry1] : 1. Rheumatoid Arthritis:  Chronic low-grade stiffness in the joints, but in mid-July 2018 she had the acute onset of polyarthritis accompanied by erythema of the LLE.  She was given prednisone, Indocin, and Bactrim for arthritis/cellulitis.  She tapered the prednisone but did not stop it.  No fever, ocular disease, bowel symptoms.  Labs notable for autoAb (RF, DNA, MILANA). Her mother had RA. Labs from her initial visit with me included a CCP > 250.  She was given prednisone 5 mg/d without much response until she added Tylenol.  The dose was increased to 7.5 mg/d still without much response. On 10 mg/d along with methotrexate 10 mg/wk, she did much better.  Swelling of the hands persisted along with stiffness and weakness.  However, pain has not been an issue. The methotrexate was increased to 15 mg/wk in late 2018, but she developed hair loss.  Lowering the methotrexate dose was agreed upon.  She stopped methotrexate in mid-March 2019 because of alopecia.  Her RA flared tremendously around this time with severe pain and swelling in the hands/wrists. A discussion took place about alternative therapies (hydroxychloroquine vs biologics-toxicities explained). \par 2. Knee OA\par 3. Chronic edema\par 4.  Fatigue: profound\par 5.  Tachycardia:  an EKG performed 2/4/19 demonstrated an SVT (appeared to be atrial flutter with 2:1 conduction).  EKG's in this chart previously demonstrated NSR at 70.  Despite a lack of symptoms, she was advised to go to the [facility] ER for further evaluation. She was diagnosed with AF and was placed on metoprolol and apixaban.\par \par Plan:\par 1.  No lab tests\par 2.  Restart prednisone 2.5 mg 2xd  \par 3.  Therapeutic options discussed (hydroxychloroquine vs biologic: toxicities, pro's, con's discussed)\par 4.  In the future, physical therapy for the knees\par 5.  Contact me about choice of therapy\par

## 2019-04-15 LAB
ALBUMIN SERPL ELPH-MCNC: 3.8 G/DL
ALP BLD-CCNC: 73 U/L
ALT SERPL-CCNC: 9 U/L
ANION GAP SERPL CALC-SCNC: 16 MMOL/L
AST SERPL-CCNC: 14 U/L
BASOPHILS # BLD AUTO: 0.03 K/UL
BASOPHILS NFR BLD AUTO: 0.4 %
BILIRUB SERPL-MCNC: 0.4 MG/DL
BUN SERPL-MCNC: 18 MG/DL
CALCIUM SERPL-MCNC: 9.5 MG/DL
CHLORIDE SERPL-SCNC: 101 MMOL/L
CO2 SERPL-SCNC: 25 MMOL/L
CREAT SERPL-MCNC: 0.5 MG/DL
CRP SERPL-MCNC: 4.5 MG/DL
EOSINOPHIL # BLD AUTO: 0.08 K/UL
EOSINOPHIL NFR BLD AUTO: 0.9 %
GLUCOSE SERPL-MCNC: 111 MG/DL
HCT VFR BLD CALC: 37.8 %
HGB BLD-MCNC: 11.9 G/DL
IMM GRANULOCYTES NFR BLD AUTO: 0.7 %
LYMPHOCYTES # BLD AUTO: 1.3 K/UL
LYMPHOCYTES NFR BLD AUTO: 15.4 %
MAN DIFF?: NORMAL
MCHC RBC-ENTMCNC: 30.4 PG
MCHC RBC-ENTMCNC: 31.5 GM/DL
MCV RBC AUTO: 96.4 FL
MONOCYTES # BLD AUTO: 0.65 K/UL
MONOCYTES NFR BLD AUTO: 7.7 %
NEUTROPHILS # BLD AUTO: 6.33 K/UL
NEUTROPHILS NFR BLD AUTO: 74.9 %
PLATELET # BLD AUTO: 325 K/UL
POTASSIUM SERPL-SCNC: 3.8 MMOL/L
PROT SERPL-MCNC: 7 G/DL
RBC # BLD: 3.92 M/UL
RBC # FLD: 14 %
SODIUM SERPL-SCNC: 142 MMOL/L
WBC # FLD AUTO: 8.45 K/UL

## 2019-04-17 LAB — G6PD SER-CCNC: 19.2 U/G HGB

## 2019-04-22 ENCOUNTER — MEDICATION RENEWAL (OUTPATIENT)
Age: 74
End: 2019-04-22

## 2019-04-22 ENCOUNTER — APPOINTMENT (OUTPATIENT)
Dept: INTERNAL MEDICINE | Facility: CLINIC | Age: 74
End: 2019-04-22
Payer: MEDICARE

## 2019-04-22 VITALS
SYSTOLIC BLOOD PRESSURE: 130 MMHG | OXYGEN SATURATION: 96 % | BODY MASS INDEX: 41.54 KG/M2 | WEIGHT: 220 LBS | HEIGHT: 61 IN | DIASTOLIC BLOOD PRESSURE: 80 MMHG | TEMPERATURE: 98.4 F | HEART RATE: 135 BPM

## 2019-04-22 PROCEDURE — 99214 OFFICE O/P EST MOD 30 MIN: CPT

## 2019-04-22 RX ORDER — METHOTREXATE 2.5 MG/1
2.5 TABLET ORAL WEEKLY
Qty: 78 | Refills: 3 | Status: DISCONTINUED | COMMUNITY
Start: 2018-08-13 | End: 2019-04-22

## 2019-04-22 NOTE — PLAN
[FreeTextEntry1] : cellulitis\par keflex 500 qid   and bactrim ds bis\par increase metorprolol to 75 daiy\par f/u 3 days

## 2019-04-22 NOTE — PHYSICAL EXAM
[No Acute Distress] : no acute distress [Well Nourished] : well nourished [No Respiratory Distress] : no respiratory distress  [Clear to Auscultation] : lungs were clear to auscultation bilaterally [de-identified] : righ foot swollen  2/3 red [de-identified] : sinus tach 130

## 2019-04-22 NOTE — REVIEW OF SYSTEMS
[Palpitations] : palpitations [Joint Pain] : joint pain [Skin Rash] : skin rash [Joint Stiffness] : joint stiffness [Fever] : no fever [Night Sweats] : no night sweats [Chest Pain] : no chest pain [Wheezing] : no wheezing [Shortness Of Breath] : no shortness of breath

## 2019-04-22 NOTE — HISTORY OF PRESENT ILLNESS
[FreeTextEntry8] : right foot red and swollen\par no fever\par change of color\par no break in in skin or obvious injury

## 2019-04-25 ENCOUNTER — NON-APPOINTMENT (OUTPATIENT)
Age: 74
End: 2019-04-25

## 2019-04-25 ENCOUNTER — APPOINTMENT (OUTPATIENT)
Dept: INTERNAL MEDICINE | Facility: CLINIC | Age: 74
End: 2019-04-25
Payer: MEDICARE

## 2019-04-25 VITALS
HEIGHT: 61 IN | WEIGHT: 220 LBS | OXYGEN SATURATION: 91 % | BODY MASS INDEX: 41.54 KG/M2 | SYSTOLIC BLOOD PRESSURE: 113 MMHG | HEART RATE: 123 BPM | DIASTOLIC BLOOD PRESSURE: 74 MMHG | TEMPERATURE: 98.4 F

## 2019-04-25 PROCEDURE — 99214 OFFICE O/P EST MOD 30 MIN: CPT | Mod: 25

## 2019-04-25 PROCEDURE — 93000 ELECTROCARDIOGRAM COMPLETE: CPT

## 2019-04-25 NOTE — PLAN
[FreeTextEntry1] : celluitis improving\par continue keflex and bactrim\par tachycardia on 75 mg metoprolol\par increase to 50 bid f/u next week cardiology

## 2019-04-25 NOTE — PHYSICAL EXAM
[No Acute Distress] : no acute distress [No JVD] : no jugular venous distention [Well Nourished] : well nourished [Supple] : supple [No Respiratory Distress] : no respiratory distress  [Clear to Auscultation] : lungs were clear to auscultation bilaterally [de-identified] : sinus tach 120 [Regular Rhythm] : with a regular rhythm [No HSM] : no HSM [de-identified] : deformed feet, right foot less red still with eccymosis

## 2019-04-25 NOTE — REVIEW OF SYSTEMS
[Palpitations] : palpitations [Chest Pain] : no chest pain [Orthopnea] : no orthopnea [Shortness Of Breath] : no shortness of breath [Hematuria] : no hematuria

## 2019-05-02 ENCOUNTER — MEDICATION RENEWAL (OUTPATIENT)
Age: 74
End: 2019-05-02

## 2019-05-06 ENCOUNTER — APPOINTMENT (OUTPATIENT)
Dept: INTERNAL MEDICINE | Facility: CLINIC | Age: 74
End: 2019-05-06
Payer: MEDICARE

## 2019-05-06 VITALS
HEART RATE: 84 BPM | DIASTOLIC BLOOD PRESSURE: 68 MMHG | HEIGHT: 61 IN | WEIGHT: 214 LBS | OXYGEN SATURATION: 92 % | SYSTOLIC BLOOD PRESSURE: 107 MMHG | TEMPERATURE: 97.6 F | BODY MASS INDEX: 40.4 KG/M2

## 2019-05-06 PROCEDURE — 36415 COLL VENOUS BLD VENIPUNCTURE: CPT

## 2019-05-06 PROCEDURE — G0439: CPT

## 2019-05-06 PROCEDURE — 99214 OFFICE O/P EST MOD 30 MIN: CPT | Mod: 25

## 2019-05-06 RX ORDER — APIXABAN 5 MG/1
5 TABLET, FILM COATED ORAL
Qty: 60 | Refills: 5 | Status: ACTIVE | COMMUNITY
Start: 2019-05-06

## 2019-05-06 NOTE — REVIEW OF SYSTEMS
[Joint Pain] : joint pain [Back Pain] : back pain [Muscle Pain] : muscle pain [Skin Rash] : skin rash [Fever] : no fever [Chest Pain] : no chest pain [Nasal Discharge] : no nasal discharge [Orthopnea] : no orthopnea [Shortness Of Breath] : no shortness of breath [Wheezing] : no wheezing [Dysuria] : no dysuria [Abdominal Pain] : no abdominal pain [Vomiting] : no vomiting [FreeTextEntry5] : afib episodes [Hematuria] : no hematuria

## 2019-05-06 NOTE — PHYSICAL EXAM
[No Acute Distress] : no acute distress [Well Nourished] : well nourished [Normal Oropharynx] : the oropharynx was normal [No JVD] : no jugular venous distention [Normal Outer Ear/Nose] : the outer ears and nose were normal in appearance [No Respiratory Distress] : no respiratory distress  [Supple] : supple [Clear to Auscultation] : lungs were clear to auscultation bilaterally [Normal Rate] : normal rate  [Regular Rhythm] : with a regular rhythm [No Edema] : there was no peripheral edema [No Extremity Clubbing/Cyanosis] : no extremity clubbing/cyanosis [Normal Bowel Sounds] : normal bowel sounds [No HSM] : no HSM [de-identified] : normal sinus at 84 soft syst murmur [de-identified] : swollen feet

## 2019-05-06 NOTE — PLAN
[FreeTextEntry1] : Medicare Annuall\par decline vaccine\par health maintenance age qppropiate\par \par \par infection better\par 3 more days of abx then stop\par continue predniosne 2.5 bid\par f/u 2 week if leg good to start plaquenil\par gout good\par bp and pulse good on metoprolol 50 bid

## 2019-05-06 NOTE — HEALTH RISK ASSESSMENT
[Fair] :  ~his/her~ mood as fair [No falls in past year] : Patient reported no falls in the past year [0] : 1) Little interest or pleasure doing things: Not at all (0) [None] : None [With Family] : lives with family [Retired] : retired [] :  [Fully functional (bathing, dressing, toileting, transferring, walking, feeding)] : Fully functional (bathing, dressing, toileting, transferring, walking, feeding) [Fully functional (using the telephone, shopping, preparing meals, housekeeping, doing laundry, using] : Fully functional and needs no help or supervision to perform IADLs (using the telephone, shopping, preparing meals, housekeeping, doing laundry, using transportation, managing medications and managing finances) [Smoke Detector] : smoke detector [Carbon Monoxide Detector] : carbon monoxide detector [Safety elements used in home] : safety elements used in home [Seat Belt] :  uses seat belt [[id number]] : 0 [Language] : denies difficulty with language [Change in mental status noted] : No change in mental status noted [Learning/Retaining New Information] : denies difficulty learning/retaining new information [Behavior] : denies difficulty with behavior [Reasoning] : denies difficulty with reasoning [Handling Complex Tasks] : denies difficulty handling complex tasks [Reports changes in hearing] : Reports no changes in hearing [Spatial Ability and Orientation] : denies difficulty with spatial ability and orientation [Guns at Home] : no guns at home [Reports changes in dental health] : Reports no changes in dental health

## 2019-05-06 NOTE — HISTORY OF PRESENT ILLNESS
[de-identified] : Medicare annual\par decline vaccine\par Last mammo 2 years\par no gyn\par last colon 2007\par \par high blood pressure on med\par afib on med and eliques\par rheumatoid arthitits off MTX on low dose steroid\par recent gout and cellulitis \par

## 2019-05-07 LAB
25(OH)D3 SERPL-MCNC: 27.6 NG/ML
ALBUMIN SERPL ELPH-MCNC: 3.5 G/DL
ALP BLD-CCNC: 79 U/L
ALT SERPL-CCNC: 15 U/L
ANION GAP SERPL CALC-SCNC: 13 MMOL/L
AST SERPL-CCNC: 14 U/L
BASOPHILS # BLD AUTO: 0.08 K/UL
BASOPHILS NFR BLD AUTO: 1 %
BILIRUB SERPL-MCNC: 0.2 MG/DL
BUN SERPL-MCNC: 22 MG/DL
CALCIUM SERPL-MCNC: 9.1 MG/DL
CHLORIDE SERPL-SCNC: 106 MMOL/L
CHOLEST SERPL-MCNC: 142 MG/DL
CHOLEST/HDLC SERPL: 2.6 RATIO
CO2 SERPL-SCNC: 21 MMOL/L
CREAT SERPL-MCNC: 0.63 MG/DL
CRP SERPL-MCNC: 2.86 MG/DL
EOSINOPHIL # BLD AUTO: 0.09 K/UL
EOSINOPHIL NFR BLD AUTO: 1.1 %
ERYTHROCYTE [SEDIMENTATION RATE] IN BLOOD BY WESTERGREN METHOD: 54 MM/HR
ESTIMATED AVERAGE GLUCOSE: 143 MG/DL
GLUCOSE SERPL-MCNC: 171 MG/DL
HBA1C MFR BLD HPLC: 6.6 %
HCT VFR BLD CALC: 36.2 %
HDLC SERPL-MCNC: 54 MG/DL
HGB BLD-MCNC: 11.3 G/DL
IMM GRANULOCYTES NFR BLD AUTO: 0.7 %
LDLC SERPL CALC-MCNC: 66 MG/DL
LYMPHOCYTES # BLD AUTO: 1.2 K/UL
LYMPHOCYTES NFR BLD AUTO: 14.7 %
MAN DIFF?: NORMAL
MCHC RBC-ENTMCNC: 30.5 PG
MCHC RBC-ENTMCNC: 31.2 GM/DL
MCV RBC AUTO: 97.8 FL
MONOCYTES # BLD AUTO: 0.61 K/UL
MONOCYTES NFR BLD AUTO: 7.5 %
NEUTROPHILS # BLD AUTO: 6.13 K/UL
NEUTROPHILS NFR BLD AUTO: 75 %
PLATELET # BLD AUTO: 341 K/UL
POTASSIUM SERPL-SCNC: 3.8 MMOL/L
PROT SERPL-MCNC: 6.7 G/DL
RBC # BLD: 3.7 M/UL
RBC # FLD: 14 %
SODIUM SERPL-SCNC: 140 MMOL/L
T4 FREE SERPL-MCNC: 1.1 NG/DL
TRIGL SERPL-MCNC: 112 MG/DL
TSH SERPL-ACNC: 3.05 UIU/ML
URATE SERPL-MCNC: 6.1 MG/DL
WBC # FLD AUTO: 8.17 K/UL

## 2019-06-04 ENCOUNTER — APPOINTMENT (OUTPATIENT)
Dept: INTERNAL MEDICINE | Facility: CLINIC | Age: 74
End: 2019-06-04
Payer: MEDICARE

## 2019-06-04 VITALS
BODY MASS INDEX: 42.29 KG/M2 | HEART RATE: 84 BPM | SYSTOLIC BLOOD PRESSURE: 105 MMHG | WEIGHT: 224 LBS | HEIGHT: 61 IN | DIASTOLIC BLOOD PRESSURE: 70 MMHG

## 2019-06-04 DIAGNOSIS — Z79.899 OTHER LONG TERM (CURRENT) DRUG THERAPY: ICD-10-CM

## 2019-06-04 PROCEDURE — 99214 OFFICE O/P EST MOD 30 MIN: CPT

## 2019-06-04 NOTE — HISTORY OF PRESENT ILLNESS
[FreeTextEntry1] : celluitis [de-identified] : cellulitis\par leg red but better\par rheumatoid arthits\par afib on metoprolol  asa and eliques\par still on prednisone\par await starting plaquineil\par joints very problematic\par mtx not effective/side effect

## 2019-06-04 NOTE — PHYSICAL EXAM
[No Acute Distress] : no acute distress [Normal Outer Ear/Nose] : the outer ears and nose were normal in appearance [Well Nourished] : well nourished [No JVD] : no jugular venous distention [Normal Oropharynx] : the oropharynx was normal [No Respiratory Distress] : no respiratory distress  [Supple] : supple [Clear to Auscultation] : lungs were clear to auscultation bilaterally [Normal Rate] : normal rate  [No Edema] : there was no peripheral edema [No Extremity Clubbing/Cyanosis] : no extremity clubbing/cyanosis [de-identified] : afib

## 2019-06-04 NOTE — REVIEW OF SYSTEMS
[Earache] : no earache [Fever] : no fever [Chest Pain] : no chest pain [Shortness Of Breath] : no shortness of breath [Orthopnea] : no orthopnea [Wheezing] : no wheezing [Abdominal Pain] : no abdominal pain [Vomiting] : no vomiting

## 2019-08-05 ENCOUNTER — APPOINTMENT (OUTPATIENT)
Dept: INTERNAL MEDICINE | Facility: CLINIC | Age: 74
End: 2019-08-05
Payer: MEDICARE

## 2019-08-05 VITALS
TEMPERATURE: 98.4 F | DIASTOLIC BLOOD PRESSURE: 80 MMHG | HEIGHT: 61 IN | BODY MASS INDEX: 43.43 KG/M2 | HEART RATE: 76 BPM | WEIGHT: 230 LBS | SYSTOLIC BLOOD PRESSURE: 120 MMHG

## 2019-08-05 PROCEDURE — 99214 OFFICE O/P EST MOD 30 MIN: CPT

## 2019-08-05 NOTE — HISTORY OF PRESENT ILLNESS
[FreeTextEntry1] : Right leg infection [de-identified] : 2 month ago cellulits\par now right leg red with superficial ulceration\par on Plaquenil\par on eliques\par last time did good on keflex and bactrim\par no chills

## 2019-08-05 NOTE — REVIEW OF SYSTEMS
[Fever] : no fever [Chest Pain] : no chest pain [Shortness Of Breath] : no shortness of breath [Orthopnea] : no orthopnea [Wheezing] : no wheezing [de-identified] : right leg red and supeficial 1 cm erosion with some weepage [Joint Pain] : joint pain

## 2019-08-05 NOTE — PHYSICAL EXAM
[No Acute Distress] : no acute distress [No JVD] : no jugular venous distention [No Respiratory Distress] : no respiratory distress  [No Lymphadenopathy] : no lymphadenopathy [Normal Rate] : normal rate  [No Accessory Muscle Use] : no accessory muscle use [Regular Rhythm] : with a regular rhythm [de-identified] : right leg warm/hot with superficial [de-identified] : swollen

## 2019-08-09 ENCOUNTER — MEDICATION RENEWAL (OUTPATIENT)
Age: 74
End: 2019-08-09

## 2019-08-16 ENCOUNTER — MEDICATION RENEWAL (OUTPATIENT)
Age: 74
End: 2019-08-16

## 2019-09-04 ENCOUNTER — APPOINTMENT (OUTPATIENT)
Dept: INTERNAL MEDICINE | Facility: CLINIC | Age: 74
End: 2019-09-04
Payer: MEDICARE

## 2019-09-04 VITALS
BODY MASS INDEX: 43.43 KG/M2 | HEIGHT: 61 IN | SYSTOLIC BLOOD PRESSURE: 130 MMHG | HEART RATE: 91 BPM | WEIGHT: 230 LBS | DIASTOLIC BLOOD PRESSURE: 80 MMHG | OXYGEN SATURATION: 94 % | TEMPERATURE: 97.9 F

## 2019-09-04 PROCEDURE — 36415 COLL VENOUS BLD VENIPUNCTURE: CPT

## 2019-09-04 PROCEDURE — 99214 OFFICE O/P EST MOD 30 MIN: CPT | Mod: 25

## 2019-09-04 RX ORDER — FOLIC ACID 1 MG/1
1 TABLET ORAL DAILY
Qty: 90 | Refills: 3 | Status: DISCONTINUED | COMMUNITY
Start: 2018-08-13 | End: 2019-09-04

## 2019-09-04 RX ORDER — CEPHALEXIN 500 MG/1
500 CAPSULE ORAL
Qty: 40 | Refills: 2 | Status: DISCONTINUED | COMMUNITY
Start: 2019-04-22 | End: 2019-09-04

## 2019-09-04 RX ORDER — SULFAMETHOXAZOLE AND TRIMETHOPRIM 800; 160 MG/1; MG/1
800-160 TABLET ORAL
Qty: 20 | Refills: 2 | Status: DISCONTINUED | COMMUNITY
Start: 2019-04-22 | End: 2019-09-04

## 2019-09-04 RX ORDER — ASPIRIN 325 MG/1
TABLET, FILM COATED ORAL
Refills: 0 | Status: DISCONTINUED | COMMUNITY
End: 2019-09-04

## 2019-09-04 NOTE — PLAN
[FreeTextEntry1] : celluitis and edema worse\par \par normal sinus\par leg swollen and red\par f/u 2 week

## 2019-09-04 NOTE — REVIEW OF SYSTEMS
[Fever] : no fever [Night Sweats] : no night sweats [Chest Pain] : no chest pain [Orthopnea] : no orthopnea [Shortness Of Breath] : no shortness of breath [Wheezing] : no wheezing [Abdominal Pain] : no abdominal pain [Vomiting] : no vomiting [Joint Pain] : joint pain [Muscle Pain] : muscle pain

## 2019-09-04 NOTE — HISTORY OF PRESENT ILLNESS
[FreeTextEntry1] : cellulitis [de-identified] : out of antibiotics for 4 day\par leg contnue red and warm\par a little worse\par still oozing\par had prolonged course of keflex/bactrim\par on lasix 40 and hctz 12.5

## 2019-09-04 NOTE — PHYSICAL EXAM
[No Acute Distress] : no acute distress [Well Nourished] : well nourished [No JVD] : no jugular venous distention [No Lymphadenopathy] : no lymphadenopathy [No Respiratory Distress] : no respiratory distress  [No Accessory Muscle Use] : no accessory muscle use [Normal Rate] : normal rate  [Regular Rhythm] : with a regular rhythm [de-identified] : 3 plus edema  red and warm

## 2019-09-05 LAB
ALBUMIN SERPL ELPH-MCNC: 4.2 G/DL
ALP BLD-CCNC: 80 U/L
ALT SERPL-CCNC: 15 U/L
ANION GAP SERPL CALC-SCNC: 14 MMOL/L
AST SERPL-CCNC: 16 U/L
BASOPHILS # BLD AUTO: 0.05 K/UL
BASOPHILS NFR BLD AUTO: 0.7 %
BILIRUB SERPL-MCNC: 0.2 MG/DL
BUN SERPL-MCNC: 27 MG/DL
CALCIUM SERPL-MCNC: 9.6 MG/DL
CHLORIDE SERPL-SCNC: 103 MMOL/L
CO2 SERPL-SCNC: 26 MMOL/L
CREAT SERPL-MCNC: 0.63 MG/DL
EOSINOPHIL # BLD AUTO: 0.1 K/UL
EOSINOPHIL NFR BLD AUTO: 1.5 %
GLUCOSE SERPL-MCNC: 88 MG/DL
HCT VFR BLD CALC: 38.9 %
HGB BLD-MCNC: 11.9 G/DL
IMM GRANULOCYTES NFR BLD AUTO: 0.4 %
LYMPHOCYTES # BLD AUTO: 0.89 K/UL
LYMPHOCYTES NFR BLD AUTO: 13.1 %
MAN DIFF?: NORMAL
MCHC RBC-ENTMCNC: 29.7 PG
MCHC RBC-ENTMCNC: 30.6 GM/DL
MCV RBC AUTO: 97 FL
MONOCYTES # BLD AUTO: 0.79 K/UL
MONOCYTES NFR BLD AUTO: 11.6 %
NEUTROPHILS # BLD AUTO: 4.95 K/UL
NEUTROPHILS NFR BLD AUTO: 72.7 %
PLATELET # BLD AUTO: 258 K/UL
POTASSIUM SERPL-SCNC: 3.8 MMOL/L
PROT SERPL-MCNC: 7.2 G/DL
RBC # BLD: 4.01 M/UL
RBC # FLD: 14.2 %
SODIUM SERPL-SCNC: 143 MMOL/L
URATE SERPL-MCNC: 9.8 MG/DL
WBC # FLD AUTO: 6.81 K/UL

## 2019-09-18 ENCOUNTER — APPOINTMENT (OUTPATIENT)
Dept: INTERNAL MEDICINE | Facility: CLINIC | Age: 74
End: 2019-09-18
Payer: MEDICARE

## 2019-09-18 VITALS
TEMPERATURE: 98.3 F | HEIGHT: 61 IN | DIASTOLIC BLOOD PRESSURE: 80 MMHG | SYSTOLIC BLOOD PRESSURE: 110 MMHG | HEART RATE: 80 BPM | WEIGHT: 230 LBS | BODY MASS INDEX: 43.43 KG/M2

## 2019-09-18 PROCEDURE — 99213 OFFICE O/P EST LOW 20 MIN: CPT

## 2019-09-18 NOTE — PHYSICAL EXAM
[No Acute Distress] : no acute distress [Well Nourished] : well nourished [Normal Outer Ear/Nose] : the outer ears and nose were normal in appearance [Normal Oropharynx] : the oropharynx was normal [No Lymphadenopathy] : no lymphadenopathy [No JVD] : no jugular venous distention [de-identified] : afib at 78 [No Respiratory Distress] : no respiratory distress

## 2019-09-18 NOTE — PLAN
[FreeTextEntry1] : celluitis worse\par stop doxycyline\par start levaquin 500\par to call 5 days\par chills to er\par of pwrse consider hospitalization

## 2019-09-18 NOTE — REVIEW OF SYSTEMS
[Chest Pain] : no chest pain [Orthopnea] : no orthopnea [Shortness Of Breath] : no shortness of breath [Wheezing] : no wheezing

## 2019-09-18 NOTE — HISTORY OF PRESENT ILLNESS
[FreeTextEntry1] : cellulitis [de-identified] : legs more red, swollen and weeping since switch from\par keflex bactrim to doxycyline\par on water pill

## 2019-09-23 RX ORDER — DOXYCYCLINE 100 MG/1
100 CAPSULE ORAL TWICE DAILY
Qty: 60 | Refills: 1 | Status: DISCONTINUED | COMMUNITY
Start: 2019-09-04 | End: 2019-09-23

## 2019-10-23 ENCOUNTER — MEDICATION RENEWAL (OUTPATIENT)
Age: 74
End: 2019-10-23

## 2019-10-25 ENCOUNTER — APPOINTMENT (OUTPATIENT)
Dept: INTERNAL MEDICINE | Facility: CLINIC | Age: 74
End: 2019-10-25
Payer: MEDICARE

## 2019-10-25 ENCOUNTER — MEDICATION RENEWAL (OUTPATIENT)
Age: 74
End: 2019-10-25

## 2019-10-25 ENCOUNTER — RX RENEWAL (OUTPATIENT)
Age: 74
End: 2019-10-25

## 2019-10-25 VITALS
DIASTOLIC BLOOD PRESSURE: 81 MMHG | HEART RATE: 87 BPM | WEIGHT: 238 LBS | HEIGHT: 61 IN | SYSTOLIC BLOOD PRESSURE: 136 MMHG | OXYGEN SATURATION: 90 % | BODY MASS INDEX: 44.93 KG/M2 | TEMPERATURE: 97.5 F

## 2019-10-25 PROCEDURE — 99214 OFFICE O/P EST MOD 30 MIN: CPT

## 2019-10-25 NOTE — PHYSICAL EXAM
[No Acute Distress] : no acute distress [Normal Voice/Communication] : normal voice/communication [No JVD] : no jugular venous distention [No Lymphadenopathy] : no lymphadenopathy [Normal Rate] : normal rate  [No Accessory Muscle Use] : no accessory muscle use [No Respiratory Distress] : no respiratory distress  [de-identified] : leg red at place with no wheezing

## 2019-10-25 NOTE — PLAN
[FreeTextEntry1] : residual redness without infection\par  lymphedema\par stop levaquin\par vascular consult lymphedema

## 2019-10-25 NOTE — HISTORY OF PRESENT ILLNESS
[de-identified] : infection leg on levaquin\par some residual reddness\par on plaquenil\par  [FreeTextEntry1] : infection legs

## 2019-11-05 ENCOUNTER — APPOINTMENT (OUTPATIENT)
Dept: VASCULAR SURGERY | Facility: CLINIC | Age: 74
End: 2019-11-05
Payer: MEDICARE

## 2019-11-05 PROCEDURE — 99203 OFFICE O/P NEW LOW 30 MIN: CPT

## 2019-11-05 PROCEDURE — 93970 EXTREMITY STUDY: CPT

## 2019-11-05 NOTE — PHYSICAL EXAM
[2+] : left 2+ [Ankle Swelling (On Exam)] : present [] : bilaterally [Ankle Swelling Bilaterally] : severe [Alert] : alert [Calm] : calm [JVD] : no jugular venous distention  [Normal Breath Sounds] : Normal breath sounds [Normal Rate and Rhythm] : normal rate and rhythm [Varicose Veins Of Lower Extremities] : not present [de-identified] : appears well [FreeTextEntry1] : dopplerable dp/pt signals bilateral lower extremities  [de-identified] : superficial ulcers over b/l medial and posterior calf

## 2019-11-05 NOTE — ASSESSMENT
[FreeTextEntry1] : 75 yo female with history of rheumatoid arthritis, afib on xarelto, htn presents for evaluation of b/l lower extremity edema and erythema with superficial ulcerations\par duplex shows evidence of insufficiency b/l \par at this time would recommend lidex topically to the surrounding erythematous skin \par pt advised to apply non-stick with abd and overlying ace bandage for compression \par pt to follow up in 2 weeks

## 2019-11-05 NOTE — HISTORY OF PRESENT ILLNESS
[FreeTextEntry1] : 73 yo female with history of rheumatoid arthritis, afib on xarelto, htn presents for evaluation of b/l lower extremity edema and erythema with superficial ulcerations.  pt states that she was treated with several courses of antibiotics with improvement in the wounds.  pt denies any history of dvt, fevers or chills.  \par

## 2019-11-05 NOTE — REVIEW OF SYSTEMS
[As Noted in HPI] : as noted in HPI [Negative] : Respiratory [Fever] : no fever [Chills] : no chills

## 2019-11-19 ENCOUNTER — APPOINTMENT (OUTPATIENT)
Dept: VASCULAR SURGERY | Facility: CLINIC | Age: 74
End: 2019-11-19
Payer: MEDICARE

## 2019-11-19 VITALS
DIASTOLIC BLOOD PRESSURE: 86 MMHG | HEART RATE: 90 BPM | SYSTOLIC BLOOD PRESSURE: 145 MMHG | BODY MASS INDEX: 44.93 KG/M2 | WEIGHT: 238 LBS | HEIGHT: 61 IN

## 2019-11-19 PROCEDURE — 99213 OFFICE O/P EST LOW 20 MIN: CPT

## 2019-11-19 NOTE — HISTORY OF PRESENT ILLNESS
[FreeTextEntry1] : 75 yo female with history of rheumatoid arthritis, afib on xarelto, htn presents for evaluation of b/l lower extremity edema and erythema with superficial ulcerations.  pt states that she was treated with several courses of antibiotics with mild improvement in the wounds.  pt denies any history of dvt, fevers or chills. \par she has been using the lidex with ace bandage for compression but still is experiencing serous drainage from b/l ulcers \par  \par

## 2019-11-19 NOTE — PHYSICAL EXAM
[Ankle Swelling (On Exam)] : present [] : present [Alert] : alert [Ankle Swelling Bilaterally] : severe [Calm] : calm [JVD] : no jugular venous distention  [Varicose Veins Of Lower Extremities] : not present [de-identified] : appears well  [de-identified] : skin thickening surrounding ulcerations, with mild erythema b/l lower extremities

## 2019-11-19 NOTE — ASSESSMENT
[FreeTextEntry1] : 73 yo female with history of rheumatoid arthritis, afib on xarelto, htn presents for evaluation of b/l lower extremity edema and erythema with superficial ulcerations\par venous duplex from last visit shows venous insufficiency bilaterally \par will start with unna boots \par pt to follow up in 1 week for unna boot change

## 2019-11-26 ENCOUNTER — APPOINTMENT (OUTPATIENT)
Dept: VASCULAR SURGERY | Facility: CLINIC | Age: 74
End: 2019-11-26
Payer: MEDICARE

## 2019-11-26 VITALS
HEIGHT: 61 IN | BODY MASS INDEX: 44.93 KG/M2 | HEART RATE: 114 BPM | WEIGHT: 238 LBS | DIASTOLIC BLOOD PRESSURE: 77 MMHG | SYSTOLIC BLOOD PRESSURE: 138 MMHG

## 2019-11-26 PROCEDURE — 29580 STRAPPING UNNA BOOT: CPT | Mod: 50

## 2019-11-26 PROCEDURE — 99213 OFFICE O/P EST LOW 20 MIN: CPT | Mod: 25

## 2019-12-03 NOTE — ASSESSMENT
[FreeTextEntry1] : 75 yo female with history of rheumatoid arthritis, afib on xarelto, htn presents for evaluation of b/l lower extremity edema and erythema with superficial ulcerations\par venous duplex from last visit shows venous insufficiency bilaterally \par will start with unna boots \par pt to follow up in 1 week for unna boot change

## 2019-12-03 NOTE — PHYSICAL EXAM
[JVD] : no jugular venous distention  [Ankle Swelling (On Exam)] : present [Varicose Veins Of Lower Extremities] : not present [] : present [Ankle Swelling Bilaterally] : severe [Alert] : alert [de-identified] : skin thickening surrounding ulcerations, with mild erythema b/l lower extremities  [Calm] : calm [de-identified] : appears well

## 2019-12-05 ENCOUNTER — APPOINTMENT (OUTPATIENT)
Dept: VASCULAR SURGERY | Facility: CLINIC | Age: 74
End: 2019-12-05
Payer: MEDICARE

## 2019-12-05 VITALS
HEIGHT: 61 IN | SYSTOLIC BLOOD PRESSURE: 149 MMHG | DIASTOLIC BLOOD PRESSURE: 77 MMHG | WEIGHT: 238 LBS | HEART RATE: 93 BPM | BODY MASS INDEX: 44.93 KG/M2

## 2019-12-05 PROCEDURE — 29580 STRAPPING UNNA BOOT: CPT

## 2019-12-05 PROCEDURE — 99213 OFFICE O/P EST LOW 20 MIN: CPT | Mod: 25

## 2019-12-05 NOTE — HISTORY OF PRESENT ILLNESS
[FreeTextEntry1] : 73 yo female with history of rheumatoid arthritis, afib on xarelto, htn presents for follow up of b/l lower extremity venous stasis ulcers and unna boot change.  pt states that the pain has improved now only with mild pain of the left lower extremity.  \par

## 2019-12-05 NOTE — ASSESSMENT
[FreeTextEntry1] : 73 yo female with history of rheumatoid arthritis, afib on xarelto, htn presents for follow up of b/l lower extremity venous stasis ulcers and unna boot change\par ulcers healing well \par pt to follow up in 1 week for unna boot change

## 2019-12-05 NOTE — PHYSICAL EXAM
[Ankle Swelling Bilaterally] : bilaterally  [2+] : left 2+ [Ankle Swelling (On Exam)] : present [Ankle Swelling On The Right] : mild [] : bilaterally [No Rash or Lesion] : No rash or lesion [Skin Ulcer] : ulcer [Alert] : alert [Calm] : calm [Varicose Veins Of Lower Extremities] : not present [JVD] : no jugular venous distention  [de-identified] : appears well

## 2019-12-11 ENCOUNTER — APPOINTMENT (OUTPATIENT)
Dept: VASCULAR SURGERY | Facility: CLINIC | Age: 74
End: 2019-12-11
Payer: MEDICARE

## 2019-12-11 PROCEDURE — 29580 STRAPPING UNNA BOOT: CPT

## 2019-12-11 PROCEDURE — 99212 OFFICE O/P EST SF 10 MIN: CPT | Mod: 25

## 2019-12-11 NOTE — ASSESSMENT
[FreeTextEntry1] : 73 yo female with history of rheumatoid arthritis, afib on xarelto, htn presents for follow up of b/l lower extremity venous stasis ulcers and unna boot change\par will change to compression stockings to the right lower extremity \par unna boot to the left lower extremity \par pt to follow up in 1 week

## 2019-12-11 NOTE — PHYSICAL EXAM
[JVD] : no jugular venous distention  [2+] : right 2+ [Ankle Swelling (On Exam)] : present [Varicose Veins Of Lower Extremities] : not present [] : bilaterally [Ankle Swelling On The Left] : moderate [No Rash or Lesion] : No rash or lesion [de-identified] : appears well  [de-identified] : right lower extremity ulcer is healed, left lower extremity ulcer granulating well, shallow base

## 2019-12-11 NOTE — HISTORY OF PRESENT ILLNESS
[FreeTextEntry1] : 75 yo female with history of rheumatoid arthritis, afib on xarelto, htn presents for follow up of b/l lower extremity venous stasis ulcers and unna boot change.  pt states that the pain has improved now only with mild pain of the left lower extremity.  \par pt removed the unna boot at home and washed both legs prior to coming to the office

## 2019-12-18 ENCOUNTER — APPOINTMENT (OUTPATIENT)
Dept: VASCULAR SURGERY | Facility: CLINIC | Age: 74
End: 2019-12-18
Payer: MEDICARE

## 2019-12-18 PROCEDURE — 99212 OFFICE O/P EST SF 10 MIN: CPT | Mod: 25

## 2019-12-18 PROCEDURE — 29580 STRAPPING UNNA BOOT: CPT

## 2019-12-18 NOTE — HISTORY OF PRESENT ILLNESS
[FreeTextEntry1] : 75 yo female with history of rheumatoid arthritis, afib on xarelto, htn presents for follow up of b/l lower extremity venous stasis ulcers and unna boot change.  pt states that she had kept the ace bandage on the right lower extremity since last visit did not apply the compression stockings.  pt denies any pain fevers or chills.  \par

## 2019-12-18 NOTE — PHYSICAL EXAM
[Ankle Swelling (On Exam)] : present [Ankle Swelling On The Right] : of the right ankle [] : bilaterally [Ankle Swelling Bilaterally] : severe [No Rash or Lesion] : No rash or lesion [Alert] : alert [Calm] : calm [JVD] : no jugular venous distention  [Varicose Veins Of Lower Extremities] : not present [de-identified] : appears well  [de-identified] : right calf posterior calf with about 4 cm superficial ulceration 2/2 desquamation of the skin,  left lower extremity ulcers very superficial

## 2019-12-18 NOTE — ASSESSMENT
[FreeTextEntry1] : 75 yo female with history of rheumatoid arthritis, afib on xarelto, htn presents for follow up of b/l lower extremity venous stasis ulcers and unna boot change\par \par pt with new ulcer over the posterior aspect of the right lower extremity \par will apply unna boots bilaterally \par pt advised that it is important to wear compression stockings in order to control the swelling of the lower extremity and avoid recurrent ulcerations.  \par pt to follow up in 1 week

## 2019-12-24 ENCOUNTER — APPOINTMENT (OUTPATIENT)
Dept: VASCULAR SURGERY | Facility: CLINIC | Age: 74
End: 2019-12-24
Payer: MEDICARE

## 2019-12-24 VITALS
SYSTOLIC BLOOD PRESSURE: 122 MMHG | BODY MASS INDEX: 44.93 KG/M2 | HEIGHT: 61 IN | WEIGHT: 238 LBS | DIASTOLIC BLOOD PRESSURE: 62 MMHG | HEART RATE: 100 BPM

## 2019-12-24 PROCEDURE — 99213 OFFICE O/P EST LOW 20 MIN: CPT | Mod: 25

## 2019-12-24 PROCEDURE — 29580 STRAPPING UNNA BOOT: CPT | Mod: RT

## 2019-12-24 NOTE — HISTORY OF PRESENT ILLNESS
[FreeTextEntry1] : 73 yo female with history of rheumatoid arthritis, afib on xarelto, htn presents for follow up of b/l lower extremity venous stasis ulcers and unna boot change.  pt states that she had kept the ace bandage on the right lower extremity since last visit did not apply the compression stockings.  pt denies any pain fevers or chills.  \par

## 2019-12-24 NOTE — ASSESSMENT
[FreeTextEntry1] : 75 yo female with history of rheumatoid arthritis, afib on xarelto, htn presents for follow up of b/l lower extremity venous stasis ulcers and unna boot change\par \par pt with new ulcer over the posterior aspect of the right lower extremity \par will apply unna boots\par pt advised that it is important to wear compression stockings in order to control the swelling of the lower extremity and avoid recurrent ulcerations.  \par pt to follow up in 1 week

## 2019-12-24 NOTE — PHYSICAL EXAM
[JVD] : no jugular venous distention  [Ankle Swelling (On Exam)] : present [Ankle Swelling On The Right] : of the right ankle [Varicose Veins Of Lower Extremities] : not present [] : bilaterally [No Rash or Lesion] : No rash or lesion [Ankle Swelling Bilaterally] : severe [Alert] : alert [Calm] : calm [de-identified] : appears well  [de-identified] : right calf posterior calf with about 4 cm superficial ulceration 2/2 desquamation of the skin,  left lower extremity ulcers very superficial

## 2019-12-31 ENCOUNTER — APPOINTMENT (OUTPATIENT)
Dept: VASCULAR SURGERY | Facility: CLINIC | Age: 74
End: 2019-12-31
Payer: MEDICARE

## 2019-12-31 PROCEDURE — 99213 OFFICE O/P EST LOW 20 MIN: CPT

## 2019-12-31 PROCEDURE — 93971 EXTREMITY STUDY: CPT

## 2019-12-31 NOTE — HISTORY OF PRESENT ILLNESS
[FreeTextEntry1] : 75 yo female with history of rheumatoid arthritis, afib on xarelto, htn presents for follow up of b/l lower extremity venous stasis ulcers and unna boot change.  pt states that she had kept the ace bandage on the left lower extremity after last visit noticed blister over the left anterior shin after 1 day that has now been draining fluid for 1 week.  pt denies any pain fevers or chills.  \par

## 2019-12-31 NOTE — PHYSICAL EXAM
[2+] : left 2+ [Ankle Swelling (On Exam)] : present [Ankle Swelling Bilaterally] : severe [] : bilaterally [Ankle Swelling On The Left] : moderate [Skin Ulcer] : ulcer [Alert] : alert [Calm] : calm [JVD] : no jugular venous distention  [de-identified] : appears well [Varicose Veins Of Lower Extremities] : not present [de-identified] : right lower extremity healed, left lower extremity vessilce that had become unroofed with granular pink base with surrounding erythema to the proximal medial calf, additional small shallow ulcer with pink granular base over the posterior aspect of the left calf

## 2019-12-31 NOTE — ASSESSMENT
[FreeTextEntry1] : 75 yo female with history of rheumatoid arthritis, afib on xarelto, htn presents for follow up of b/l lower extremity venous stasis ulcers and unna boot change now with 2 left lower extremity vessicles/ulcers with fluid drainage and surrounding cellulitis right lower extremity wound now healed\par venous duplex negative for dvt \par will start keflex for cellulitis \par pt advised to elevate the lower extremities and apply ace bandage with non-stick dressing daily \par pt to follow up in 1 week

## 2020-01-07 ENCOUNTER — APPOINTMENT (OUTPATIENT)
Dept: VASCULAR SURGERY | Facility: CLINIC | Age: 75
End: 2020-01-07
Payer: MEDICARE

## 2020-01-07 PROCEDURE — 29580 STRAPPING UNNA BOOT: CPT | Mod: 50

## 2020-01-07 PROCEDURE — 99213 OFFICE O/P EST LOW 20 MIN: CPT | Mod: 25

## 2020-01-07 NOTE — PHYSICAL EXAM
[Ankle Swelling (On Exam)] : present [Ankle Swelling On The Left] : moderate [] : bilaterally [Ankle Swelling Bilaterally] : severe [JVD] : no jugular venous distention  [Varicose Veins Of Lower Extremities] : not present [de-identified] : left lower extremity erythema has improved, open unroofed vessicle with pink granular base over the left anterior shin  [de-identified] : appears well

## 2020-01-07 NOTE — ASSESSMENT
[FreeTextEntry1] : 73 yo female with history of rheumatoid arthritis, afib on xarelto, htn presents for follow up of b/l lower extremity venous stasis ulcers\par cellulitis improved with keflex continued open ulcer over the anterior shin\par will give additional 5 days of keflex and will apply b/l lower extremity unna boots \par pt to follow up in 1 week

## 2020-01-07 NOTE — HISTORY OF PRESENT ILLNESS
[FreeTextEntry1] : 75 yo female with history of rheumatoid arthritis, afib on xarelto, htn presents for follow up of b/l lower extremity venous stasis ulcers after starting antibiotics for left lower extremity cellulitis\par pt reports a few day history of pain in the right groin followed by ecchymosis of the right thigh pt is currently on ac for afib \par

## 2020-01-14 ENCOUNTER — APPOINTMENT (OUTPATIENT)
Dept: VASCULAR SURGERY | Facility: CLINIC | Age: 75
End: 2020-01-14
Payer: MEDICARE

## 2020-01-14 PROCEDURE — 99213 OFFICE O/P EST LOW 20 MIN: CPT | Mod: 25

## 2020-01-14 PROCEDURE — 29580 STRAPPING UNNA BOOT: CPT | Mod: 50

## 2020-01-14 RX ORDER — CEPHALEXIN 500 MG/1
500 CAPSULE ORAL
Qty: 10 | Refills: 0 | Status: COMPLETED | COMMUNITY
Start: 2019-12-31 | End: 2020-01-14

## 2020-01-14 NOTE — ASSESSMENT
[FreeTextEntry1] : 73 yo female with history of rheumatoid arthritis, afib on xarelto, htn presents for follow up of b/l lower extremity venous stasis ulcers s/p about 2 week course of abx for cellulitis\par b/l unna boots applied\par pt to follow up in 1 week

## 2020-01-14 NOTE — PHYSICAL EXAM
[Ankle Swelling (On Exam)] : present [Ankle Swelling Bilaterally] : severe [] : bilaterally [Ankle Swelling On The Left] : moderate [Alert] : alert [Calm] : calm [JVD] : no jugular venous distention  [Varicose Veins Of Lower Extremities] : not present [de-identified] : appears well [de-identified] : superficial ulcer over the left anterior shin, right lower extremity with new intact vessicle over the mid anterior shin

## 2020-01-14 NOTE — HISTORY OF PRESENT ILLNESS
[FreeTextEntry1] : 75 yo female with history of rheumatoid arthritis, afib on xarelto, htn presents for follow up of b/l lower extremity venous stasis ulcers s/p about 2 week course of abx for cellulitis\par pt denies any fevers or chills\par

## 2020-01-21 ENCOUNTER — APPOINTMENT (OUTPATIENT)
Dept: VASCULAR SURGERY | Facility: CLINIC | Age: 75
End: 2020-01-21
Payer: MEDICARE

## 2020-01-21 PROCEDURE — 99213 OFFICE O/P EST LOW 20 MIN: CPT | Mod: 25

## 2020-01-21 PROCEDURE — 29580 STRAPPING UNNA BOOT: CPT | Mod: 50

## 2020-01-21 NOTE — PHYSICAL EXAM
[Ankle Swelling (On Exam)] : present [Ankle Swelling Bilaterally] : bilaterally  [Ankle Swelling On The Left] : moderate [] : bilaterally [Ankle Swelling On The Right] : mild [JVD] : no jugular venous distention  [Varicose Veins Of Lower Extremities] : not present [de-identified] : ulcer over the left anterior shin with shallow base  [de-identified] : appears well

## 2020-01-21 NOTE — HISTORY OF PRESENT ILLNESS
[FreeTextEntry1] : 73 yo female with history of rheumatoid arthritis, afib on xarelto, htn presents for follow up of b/l lower extremity venous stasis ulcers and unna boot change\par pt denies any fevers or chills\par

## 2020-01-21 NOTE — ASSESSMENT
[FreeTextEntry1] : 75 yo female with history of rheumatoid arthritis, afib on xarelto, htn presents for follow up of b/l lower extremity venous stasis ulcers and unna boot change now with 2 left lower extremity vessicles/ulcers

## 2020-01-28 ENCOUNTER — APPOINTMENT (OUTPATIENT)
Dept: VASCULAR SURGERY | Facility: CLINIC | Age: 75
End: 2020-01-28
Payer: MEDICARE

## 2020-01-28 PROCEDURE — 29580 STRAPPING UNNA BOOT: CPT | Mod: 50

## 2020-01-28 PROCEDURE — 99213 OFFICE O/P EST LOW 20 MIN: CPT | Mod: 25

## 2020-01-28 NOTE — PHYSICAL EXAM
[Ankle Swelling (On Exam)] : present [2+] : left 2+ [] : bilaterally [Skin Ulcer] : ulcer [Ankle Swelling On The Left] : moderate [Alert] : alert [Calm] : calm [JVD] : no jugular venous distention  [de-identified] : appears well [Varicose Veins Of Lower Extremities] : not present

## 2020-01-28 NOTE — ASSESSMENT
[FreeTextEntry1] : 75 yo female with history of rheumatoid arthritis, afib on xarelto, htn presents for follow up of b/l lower extremity venous stasis ulcers and unna boot change now with 2 left lower extremity vessicles/ulcers with fluid drainage and surrounding cellulitis right lower extremity wound now healed\par venous stasis ulcer healing well \par unna boots applied\par pt advised to have legs measured and obtain appropriately sized compression stockings prior to next weeks visit\par pt to follow up in 1 week

## 2020-02-05 ENCOUNTER — APPOINTMENT (OUTPATIENT)
Dept: VASCULAR SURGERY | Facility: CLINIC | Age: 75
End: 2020-02-05

## 2020-02-06 ENCOUNTER — APPOINTMENT (OUTPATIENT)
Dept: VASCULAR SURGERY | Facility: CLINIC | Age: 75
End: 2020-02-06
Payer: MEDICARE

## 2020-02-06 PROCEDURE — 99213 OFFICE O/P EST LOW 20 MIN: CPT

## 2020-02-06 NOTE — HISTORY OF PRESENT ILLNESS
[FreeTextEntry1] : 73 yo female with history of rheumatoid arthritis, afib on xarelto, htn presents for follow up of b/l lower extremity venous stasis ulcers and unna boot change.  pt was unable to make it to the office yesterday took unna boot off and showered wore compression stockings all day and stated that she then noted new vesicle that is now weeping\par pt denies any fevers or chills\par

## 2020-02-06 NOTE — PHYSICAL EXAM
[Ankle Swelling (On Exam)] : present [Ankle Swelling Bilaterally] : severe [] : present [Ankle Swelling On The Left] : moderate [JVD] : no jugular venous distention  [Varicose Veins Of Lower Extremities] : not present [de-identified] : appears well  [de-identified] : weeping left lower extremity edema

## 2020-02-06 NOTE — ASSESSMENT
[FreeTextEntry1] : 75 yo female with history of rheumatoid arthritis, afib on xarelto, htn presents for follow up of b/l lower extremity venous stasis ulcers and unna boot change now with 2 left lower extremity vesicles/ulcers with fluid drainage \par \par at this point would recommend lymphedema therapy to help manage lower extremity edema

## 2020-02-20 ENCOUNTER — APPOINTMENT (OUTPATIENT)
Dept: VASCULAR SURGERY | Facility: CLINIC | Age: 75
End: 2020-02-20
Payer: MEDICARE

## 2020-02-20 VITALS
BODY MASS INDEX: 44.93 KG/M2 | SYSTOLIC BLOOD PRESSURE: 130 MMHG | HEIGHT: 61 IN | WEIGHT: 238 LBS | HEART RATE: 93 BPM | DIASTOLIC BLOOD PRESSURE: 68 MMHG

## 2020-02-20 PROCEDURE — 99212 OFFICE O/P EST SF 10 MIN: CPT | Mod: 25

## 2020-02-20 PROCEDURE — 29580 STRAPPING UNNA BOOT: CPT

## 2020-02-20 NOTE — HISTORY OF PRESENT ILLNESS
[FreeTextEntry1] : 75 yo female with history of rheumatoid arthritis, afib on xarelto, htn presents for follow up of b/l lower extremity venous stasis ulcers.  pt was refered to lymphedema therapy for further management with new wounds pt states that they will be unable to do therapy of the left lower extremity.  pt was refered to wound care and unable to schedule appointment until the end of march presents today for further evaluation.  \par pt denies any fevers or chills\par

## 2020-02-20 NOTE — PHYSICAL EXAM
[Ankle Swelling (On Exam)] : present [Ankle Swelling Bilaterally] : severe [] : bilaterally [Ankle Swelling On The Left] : moderate [Alert] : alert [Calm] : calm [JVD] : no jugular venous distention  [Varicose Veins Of Lower Extremities] : not present [de-identified] : appears well  [de-identified] : weeping left lower extremity edema especially over the medial proximal calf with new small ulceration.  right lower extremity new ulceration over the anterior shin with clear serous fluid drainage

## 2020-02-20 NOTE — ASSESSMENT
[FreeTextEntry1] : 73 yo female with history of rheumatoid arthritis, afib on xarelto, htn presents for follow up of b/l lower extremity venous stasis ulcers \par unna boot applied b/l lower extremities \par pt to follow up in 1 week \par \par

## 2020-02-27 ENCOUNTER — APPOINTMENT (OUTPATIENT)
Dept: VASCULAR SURGERY | Facility: CLINIC | Age: 75
End: 2020-02-27
Payer: MEDICARE

## 2020-02-27 PROCEDURE — 29580 STRAPPING UNNA BOOT: CPT | Mod: 50

## 2020-02-27 PROCEDURE — 99213 OFFICE O/P EST LOW 20 MIN: CPT | Mod: 25

## 2020-02-27 NOTE — HISTORY OF PRESENT ILLNESS
[FreeTextEntry1] : 75 yo female with history of rheumatoid arthritis, afib on xarelto, htn presents for follow up of b/l lower extremity ulcers for unna boot change.  pt was refered to lymphedema therapy for further management with new wounds pt states that they will be unable to do therapy of the left lower extremity.  pt was refered to wound care and scheduled for next week\par pt without any complaints states that the legs feel better after 1 week of unna boots again \par pt denies any fevers or chills\par

## 2020-02-27 NOTE — ASSESSMENT
[FreeTextEntry1] : 75 yo female with history of rheumatoid arthritis, afib on xarelto, htn presents for follow up of b/l lower extremity ulcers for unna boot change. \par \par unna boots replaced \par pt has appointment with wound care next week and will start lymphedema therapy next week\par pt to follow up in 1 - 2 months

## 2020-02-27 NOTE — PHYSICAL EXAM
[Ankle Swelling (On Exam)] : present [Ankle Swelling Bilaterally] : bilaterally  [] : bilaterally [Ankle Swelling On The Left] : moderate [Alert] : alert [Calm] : calm [JVD] : no jugular venous distention  [Varicose Veins Of Lower Extremities] : not present [de-identified] : appears well  [de-identified] : left lower extremity edema healed vessicle along medial proximal calf with small open wound proximally with pink granular base.  right lower extremity ulcer over the anterior shin clean and dry with pink granular base

## 2020-03-02 ENCOUNTER — APPOINTMENT (OUTPATIENT)
Dept: WOUND CARE | Facility: CLINIC | Age: 75
End: 2020-03-02
Payer: MEDICARE

## 2020-03-02 VITALS
SYSTOLIC BLOOD PRESSURE: 137 MMHG | HEART RATE: 89 BPM | RESPIRATION RATE: 18 BRPM | TEMPERATURE: 97.5 F | DIASTOLIC BLOOD PRESSURE: 80 MMHG

## 2020-03-02 DIAGNOSIS — T14.90XA INJURY, UNSPECIFIED, INITIAL ENCOUNTER: ICD-10-CM

## 2020-03-02 PROCEDURE — 99204 OFFICE O/P NEW MOD 45 MIN: CPT

## 2020-03-11 ENCOUNTER — RX RENEWAL (OUTPATIENT)
Age: 75
End: 2020-03-11

## 2020-03-18 NOTE — HISTORY OF PRESENT ILLNESS
[FreeTextEntry1] : Ms. FLORINDA VALVERDE   presents to the office with a recurrent wound for several months duration.  The wound is located on  the Right lower extremity.  The patient has complaints of pain, weeping and swelling. The patient has been dressing the wound with unna boots. The patient denies fevers or chills.  The patient has localized pain to the wound upon dressing changes.  The patient has no other complaints or associated symptoms.

## 2020-03-18 NOTE — PHYSICAL EXAM
[Normal Breath Sounds] : Normal breath sounds [2+] : right 2+ [Ankle Swelling (On Exam)] : present [Ankle Swelling Bilaterally] : bilaterally  [Ankle Swelling On The Left] : moderate [] : bilaterally [Alert] : alert [Oriented to Person] : oriented to person [Oriented to Place] : oriented to place [Oriented to Time] : oriented to time [Calm] : calm [FreeTextEntry1] : leg [FreeTextEntry2] : 0 [FreeTextEntry3] : 0 [FreeTextEntry4] : 0 [TWNoteComboBox1] : Right [JVD] : no jugular venous distention  [Varicose Veins Of Lower Extremities] : not present [Abdomen Tenderness] : ~T ~M No abdominal tenderness [Purpura] : no purpura  [Petechiae] : no petechiae [Skin Ulcer] : no ulcer [Skin Induration] : no induration [de-identified] : appears well , ambulatory [de-identified] : NAD [de-identified] : supple [de-identified] : soft, obese [de-identified] : bilateral lower extremity edema

## 2020-03-18 NOTE — ASSESSMENT
[FreeTextEntry1] : The patient presents with a wound to the lower extremity now healed.  Swelling noted to the extremity secondary to venous insufficiency and lymphedema.  \par \par No clinical sign of infection\par Recommendation:\par Venous ablation\par Leg elevation as tolerated\par Encouraged ambulation or exercise.\par Optimization of nutrition.\par Offloading to the wound site.\par \par Follow up appointment scheduled for prn\par Conservative methods have been tried and failed such as stockings, elevation.\par Lymphedema is chronic and referring patient for a lymphedema pump and bilateral leg sleeves.\par \par

## 2020-04-02 ENCOUNTER — APPOINTMENT (OUTPATIENT)
Dept: VASCULAR SURGERY | Facility: CLINIC | Age: 75
End: 2020-04-02

## 2020-04-26 ENCOUNTER — RX RENEWAL (OUTPATIENT)
Age: 75
End: 2020-04-26

## 2020-05-11 ENCOUNTER — RX RENEWAL (OUTPATIENT)
Age: 75
End: 2020-05-11

## 2020-06-01 ENCOUNTER — RX RENEWAL (OUTPATIENT)
Age: 75
End: 2020-06-01

## 2020-08-15 ENCOUNTER — RX RENEWAL (OUTPATIENT)
Age: 75
End: 2020-08-15

## 2020-09-06 ENCOUNTER — RX RENEWAL (OUTPATIENT)
Age: 75
End: 2020-09-06

## 2020-10-13 ENCOUNTER — APPOINTMENT (OUTPATIENT)
Dept: INTERNAL MEDICINE | Facility: CLINIC | Age: 75
End: 2020-10-13
Payer: MEDICARE

## 2020-10-13 VITALS
DIASTOLIC BLOOD PRESSURE: 66 MMHG | SYSTOLIC BLOOD PRESSURE: 102 MMHG | HEART RATE: 88 BPM | OXYGEN SATURATION: 95 % | WEIGHT: 238 LBS | BODY MASS INDEX: 46.72 KG/M2 | TEMPERATURE: 98.3 F | HEIGHT: 60 IN

## 2020-10-13 PROCEDURE — 99213 OFFICE O/P EST LOW 20 MIN: CPT

## 2020-10-13 RX ORDER — LEVOFLOXACIN 500 MG/1
500 TABLET, FILM COATED ORAL
Qty: 21 | Refills: 1 | Status: DISCONTINUED | COMMUNITY
Start: 2019-09-18 | End: 2020-10-13

## 2020-10-13 RX ORDER — HYDROXYCHLOROQUINE SULFATE 200 MG/1
200 TABLET, FILM COATED ORAL DAILY
Qty: 180 | Refills: 0 | Status: DISCONTINUED | COMMUNITY
Start: 2019-04-22 | End: 2020-10-13

## 2020-10-13 NOTE — PHYSICAL EXAM
[Normal] : normal rate, regular rhythm, normal S1 and S2 and no murmur heard [de-identified] : swollen red and weeping

## 2020-10-18 ENCOUNTER — RESULT REVIEW (OUTPATIENT)
Age: 75
End: 2020-10-18

## 2020-10-19 ENCOUNTER — APPOINTMENT (OUTPATIENT)
Dept: WOUND CARE | Facility: CLINIC | Age: 75
End: 2020-10-19
Payer: MEDICARE

## 2020-10-19 VITALS — RESPIRATION RATE: 16 BRPM | SYSTOLIC BLOOD PRESSURE: 134 MMHG | DIASTOLIC BLOOD PRESSURE: 77 MMHG | HEART RATE: 80 BPM

## 2020-10-19 VITALS — TEMPERATURE: 97.3 F | HEIGHT: 63 IN | BODY MASS INDEX: 35.44 KG/M2 | WEIGHT: 200 LBS

## 2020-10-19 PROCEDURE — 11045 DBRDMT SUBQ TISS EACH ADDL: CPT

## 2020-10-19 PROCEDURE — 11042 DBRDMT SUBQ TIS 1ST 20SQCM/<: CPT

## 2020-10-23 ENCOUNTER — RX RENEWAL (OUTPATIENT)
Age: 75
End: 2020-10-23

## 2020-10-26 ENCOUNTER — APPOINTMENT (OUTPATIENT)
Dept: WOUND CARE | Facility: CLINIC | Age: 75
End: 2020-10-26
Payer: MEDICARE

## 2020-10-26 PROCEDURE — 99213 OFFICE O/P EST LOW 20 MIN: CPT | Mod: 95

## 2020-10-26 NOTE — HISTORY OF PRESENT ILLNESS
[Home] : at home, [unfilled] , at the time of the visit. [Medical Office: ([facility])___] : at the medical office located in  [Spouse] : spouse [Verbal consent obtained from patient] : the patient, [unfilled] [FreeTextEntry1] : 76 yo female with a left leg  wound\par Has a Vn x past several days who attends daily and cleans wound\par On Eliquis- "dark material" in wound\par denies fever or chills\par Submitted photros today- to download- wound described as unchanged, and large\par Methotrexate stopped, but remains on Prednisone for RA

## 2020-10-26 NOTE — ASSESSMENT
[FreeTextEntry1] : discussed prednisone inhibitoy effects on wound healing\par VD- Dec 2019; consider repeat\par AD - results noted\par f/u visit in two weeks

## 2020-10-26 NOTE — PHYSICAL EXAM
[de-identified] : seated at table at home, good hygiene [de-identified] : non labored [de-identified] : limited ambulation [de-identified] : awake, alert , conversant [FreeTextEntry1] : lower leg [de-identified] : honey [de-identified] : will upload photos sent today [TWNoteComboBox1] : Left [de-identified] : Ace wraps

## 2020-10-26 NOTE — PHYSICAL EXAM
[de-identified] : seated at table at home, good hygiene [de-identified] : non labored [de-identified] : limited ambulation [de-identified] : awake, alert , conversant [FreeTextEntry1] : lower leg [de-identified] : honey [de-identified] : will upload photos sent today [TWNoteComboBox1] : Left [de-identified] : Ace wraps

## 2020-10-26 NOTE — HISTORY OF PRESENT ILLNESS
[Home] : at home, [unfilled] , at the time of the visit. [Medical Office: ([facility])___] : at the medical office located in  [Spouse] : spouse [Verbal consent obtained from patient] : the patient, [unfilled] [FreeTextEntry1] : 74 yo female with a left leg  wound\par Has a Vn x past several days who attends daily and cleans wound\par On Eliquis- "dark material" in wound\par denies fever or chills\par Submitted photros today- to download- wound described as unchanged, and large\par Methotrexate stopped, but remains on Prednisone for RA

## 2020-10-28 ENCOUNTER — APPOINTMENT (OUTPATIENT)
Dept: WOUND CARE | Facility: CLINIC | Age: 75
End: 2020-10-28
Payer: MEDICARE

## 2020-10-28 VITALS
TEMPERATURE: 98 F | SYSTOLIC BLOOD PRESSURE: 124 MMHG | RESPIRATION RATE: 17 BRPM | HEART RATE: 78 BPM | DIASTOLIC BLOOD PRESSURE: 73 MMHG

## 2020-10-28 PROCEDURE — 11045 DBRDMT SUBQ TISS EACH ADDL: CPT

## 2020-10-28 PROCEDURE — 11042 DBRDMT SUBQ TIS 1ST 20SQCM/<: CPT

## 2020-10-28 RX ORDER — CEPHALEXIN 500 MG/1
500 CAPSULE ORAL
Qty: 40 | Refills: 2 | Status: DISCONTINUED | COMMUNITY
Start: 2020-10-13 | End: 2020-10-28

## 2020-10-28 RX ORDER — SULFAMETHOXAZOLE AND TRIMETHOPRIM 800; 160 MG/1; MG/1
800-160 TABLET ORAL
Qty: 20 | Refills: 1 | Status: DISCONTINUED | COMMUNITY
Start: 2020-10-13 | End: 2020-10-28

## 2020-10-28 RX ORDER — LEVOFLOXACIN 500 MG/1
500 TABLET, FILM COATED ORAL
Qty: 10 | Refills: 1 | Status: DISCONTINUED | COMMUNITY
Start: 2020-10-16 | End: 2020-10-28

## 2020-10-29 ENCOUNTER — RESULT REVIEW (OUTPATIENT)
Age: 75
End: 2020-10-29

## 2020-10-29 NOTE — PHYSICAL EXAM
[de-identified] : seated at table at home, good hygiene [de-identified] : non labored [de-identified] : limited ambulation [de-identified] : awake, alert , conversant [FreeTextEntry1] : lower leg [de-identified] : honey [de-identified] : will upload photos sent today [TWNoteComboBox1] : Left [de-identified] : Ace wraps

## 2020-10-29 NOTE — HISTORY OF PRESENT ILLNESS
[FreeTextEntry1] : 74 yo female with a left leg  wound\par Has a Vn x past several days who attends daily and cleans wound\par On Eliquis- "dark material" in wound\par denies fever or chills\par Submitted photros today- to download- wound described as unchanged, and large\par Methotrexate stopped, but remains on Prednisone for RA

## 2020-10-29 NOTE — ASSESSMENT
[FreeTextEntry1] : discussed prednisone inhibitoy effects on wound healing\par VD- Dec 2019; consider repeat\par AD - results noted\par f/u visit in two days\par s/p excisional debridement\par Cultures taken\par Renew levoquin\par D/w Dr. Ochoa\par [phone]\par recommended avoidance of hospitalization at this time during COVID with patient comorbidies

## 2020-10-30 ENCOUNTER — APPOINTMENT (OUTPATIENT)
Dept: WOUND CARE | Facility: CLINIC | Age: 75
End: 2020-10-30
Payer: MEDICARE

## 2020-10-30 VITALS
HEIGHT: 63 IN | WEIGHT: 200 LBS | HEART RATE: 106 BPM | TEMPERATURE: 97.5 F | BODY MASS INDEX: 35.44 KG/M2 | SYSTOLIC BLOOD PRESSURE: 101 MMHG | DIASTOLIC BLOOD PRESSURE: 65 MMHG

## 2020-10-30 PROCEDURE — 11042 DBRDMT SUBQ TIS 1ST 20SQCM/<: CPT

## 2020-10-30 PROCEDURE — 11045 DBRDMT SUBQ TISS EACH ADDL: CPT

## 2020-10-30 NOTE — PHYSICAL EXAM
[de-identified] : seated at table at home, good hygiene [de-identified] : non labored [de-identified] : limited ambulation [de-identified] : awake, alert , conversant [FreeTextEntry1] : lower leg [de-identified] : honey [de-identified] : will upload photos sent today [TWNoteComboBox1] : Left [de-identified] : Ace wraps

## 2020-10-30 NOTE — ASSESSMENT
[FreeTextEntry1] : discussed prednisone inhibitory effects on wound healing\par s/p evacuation of hematoma/abscess\par \par f/u visit Monday\par s/p excisional debridement\par Cultures taken\par Continue daily antiseptic dressing\par Renew levoquin\par D/w Dr. Ochoa\par [phone]\par recommended avoidance of hospitalization at this time during COVID with patient comorbidies

## 2020-11-02 ENCOUNTER — APPOINTMENT (OUTPATIENT)
Dept: WOUND CARE | Facility: CLINIC | Age: 75
End: 2020-11-02
Payer: MEDICARE

## 2020-11-02 VITALS — WEIGHT: 200 LBS | BODY MASS INDEX: 35.44 KG/M2 | HEIGHT: 63 IN | TEMPERATURE: 96.4 F

## 2020-11-02 PROCEDURE — 11042 DBRDMT SUBQ TIS 1ST 20SQCM/<: CPT

## 2020-11-02 PROCEDURE — 11045 DBRDMT SUBQ TISS EACH ADDL: CPT

## 2020-11-02 NOTE — PHYSICAL EXAM
[de-identified] : seated at table at home, good hygiene [de-identified] : non labored [de-identified] : limited ambulation [de-identified] : awake, alert , conversant [FreeTextEntry1] : lower leg [de-identified] : honey [de-identified] : will upload photos sent today [TWNoteComboBox1] : Left [de-identified] : Ace wraps

## 2020-11-02 NOTE — PLAN
[FreeTextEntry1] : 11/2/20\par Plan - c/w moistened dakins, to wound, xtrasorb, airam, ace, orders for nurse given, moisturize dry skin\par Follow up this week for more debridement

## 2020-11-02 NOTE — ASSESSMENT
[FreeTextEntry1] : discussed prednisone inhibitory effects on wound healing\par s/p evacuation of hematoma/abscess\par \par f/u visit Monday\par s/p excisional debridement\par Cultures taken\par Continue daily antiseptic dressing\par Renew levoquin\par D/w Dr. Ochoa\par [phone]\par recommended avoidance of hospitalization at this time during COVID with patient comorbidies\par \par 11/2/20\par Left leg has been treated with moistened dakins, slight improvement, but remains with slough, debrided today

## 2020-11-04 ENCOUNTER — APPOINTMENT (OUTPATIENT)
Dept: WOUND CARE | Facility: CLINIC | Age: 75
End: 2020-11-04
Payer: MEDICARE

## 2020-11-04 VITALS
SYSTOLIC BLOOD PRESSURE: 112 MMHG | HEART RATE: 128 BPM | DIASTOLIC BLOOD PRESSURE: 72 MMHG | RESPIRATION RATE: 16 BRPM | TEMPERATURE: 97.6 F

## 2020-11-04 VITALS — HEART RATE: 64 BPM

## 2020-11-04 PROCEDURE — 11042 DBRDMT SUBQ TIS 1ST 20SQCM/<: CPT

## 2020-11-04 PROCEDURE — 11045 DBRDMT SUBQ TISS EACH ADDL: CPT

## 2020-11-04 NOTE — ASSESSMENT
[FreeTextEntry1] : discussed prednisone inhibitory effects on wound healing\par s/p evacuation of hematoma/abscess\par \par f/u visit Monday\par s/p excisional debridement\par Cultures taken\par Continue daily antiseptic dressing\par Renew levoquin\par D/w Dr. Ochoa\par [phone]\par recommended avoidance of hospitalization at this time during COVID with patient comorbidies\par \par 11/2/20\par Left leg has been treated with moistened dakins, slight improvement, but remains with slough, debrided today\par 11/4/20\par Left leg debrided after topical anesthetic and injecteable lidocaine w/epinephrine, slough is becoming more rubbery and looser

## 2020-11-04 NOTE — PHYSICAL EXAM
[de-identified] : seated at table at home, good hygiene [de-identified] : non labored [de-identified] : limited ambulation [de-identified] : awake, alert , conversant [FreeTextEntry1] : lower leg [de-identified] : honey [de-identified] : will upload photos sent today [TWNoteComboBox1] : Left [de-identified] : Ace wraps

## 2020-11-04 NOTE — PLAN
[FreeTextEntry1] : 11/4/20\par Plan -  santyl ordered to vivo, c/w dakins wash and moistened dakins to wound, when santyl arrives, start applying to moistened dakins and apply to wound bed, airam, ace, orders for nurse given\par Follow up 2 days in office for serial debridement

## 2020-11-04 NOTE — HISTORY OF PRESENT ILLNESS
[FreeTextEntry1] : 76 yo female with a left leg  wound\par Has a Vn x past several days who attends daily and cleans wound\par On Eliquis- "dark material" in wound\par denies fever or chills\par Submitted photros today- to download- wound described as unchanged, and large\par Methotrexate stopped, but remains on Prednisone for RA

## 2020-11-04 NOTE — CONSULT LETTER
BCL removed with [employer]. [Consult Letter:] : I had the pleasure of evaluating your patient, [unfilled]. [Please see my note below.] : Please see my note below. [Consult Closing:] : Thank you very much for allowing me to participate in the care of this patient.  If you have any questions, please do not hesitate to contact me. [Sincerely,] : Sincerely,

## 2020-11-06 ENCOUNTER — APPOINTMENT (OUTPATIENT)
Dept: WOUND CARE | Facility: CLINIC | Age: 75
End: 2020-11-06
Payer: MEDICARE

## 2020-11-06 VITALS
HEART RATE: 61 BPM | TEMPERATURE: 98.6 F | HEIGHT: 63 IN | SYSTOLIC BLOOD PRESSURE: 106 MMHG | WEIGHT: 200 LBS | DIASTOLIC BLOOD PRESSURE: 63 MMHG | BODY MASS INDEX: 35.44 KG/M2

## 2020-11-06 DIAGNOSIS — Z82.5 FAMILY HISTORY OF ASTHMA AND OTHER CHRONIC LOWER RESPIRATORY DISEASES: ICD-10-CM

## 2020-11-06 DIAGNOSIS — L03.90 CELLULITIS, UNSPECIFIED: ICD-10-CM

## 2020-11-06 DIAGNOSIS — Z87.891 PERSONAL HISTORY OF NICOTINE DEPENDENCE: ICD-10-CM

## 2020-11-06 PROCEDURE — 11043 DBRDMT MUSC&/FSCA 1ST 20/<: CPT

## 2020-11-06 PROCEDURE — 99214 OFFICE O/P EST MOD 30 MIN: CPT | Mod: 25

## 2020-11-07 ENCOUNTER — OUTPATIENT (OUTPATIENT)
Dept: OUTPATIENT SERVICES | Facility: HOSPITAL | Age: 75
LOS: 1 days | End: 2020-11-07
Payer: MEDICARE

## 2020-11-07 ENCOUNTER — APPOINTMENT (OUTPATIENT)
Dept: RADIOLOGY | Facility: IMAGING CENTER | Age: 75
End: 2020-11-07
Payer: MEDICARE

## 2020-11-07 DIAGNOSIS — L98.499 NON-PRESSURE CHRONIC ULCER OF SKIN OF OTHER SITES WITH UNSPECIFIED SEVERITY: ICD-10-CM

## 2020-11-07 PROCEDURE — 73590 X-RAY EXAM OF LOWER LEG: CPT

## 2020-11-07 PROCEDURE — 73590 X-RAY EXAM OF LOWER LEG: CPT | Mod: 26,LT

## 2020-11-09 ENCOUNTER — RESULT REVIEW (OUTPATIENT)
Age: 75
End: 2020-11-09

## 2020-11-09 ENCOUNTER — APPOINTMENT (OUTPATIENT)
Dept: WOUND CARE | Facility: CLINIC | Age: 75
End: 2020-11-09
Payer: MEDICARE

## 2020-11-09 VITALS
RESPIRATION RATE: 18 BRPM | SYSTOLIC BLOOD PRESSURE: 120 MMHG | TEMPERATURE: 97.6 F | HEART RATE: 76 BPM | DIASTOLIC BLOOD PRESSURE: 77 MMHG

## 2020-11-09 PROBLEM — L03.90 NECROTIZING CELLULITIS: Status: ACTIVE | Noted: 2020-11-09

## 2020-11-09 PROCEDURE — 93970 EXTREMITY STUDY: CPT

## 2020-11-09 PROCEDURE — 11042 DBRDMT SUBQ TIS 1ST 20SQCM/<: CPT

## 2020-11-09 PROCEDURE — 11045 DBRDMT SUBQ TISS EACH ADDL: CPT

## 2020-11-09 PROCEDURE — 93923 UPR/LXTR ART STDY 3+ LVLS: CPT

## 2020-11-09 NOTE — PHYSICAL EXAM
[de-identified] : seated at table at home, good hygiene [de-identified] : non labored [de-identified] : limited ambulation [de-identified] : awake, alert , conversant [Please See PDF for [employer]] : Please See PDF for [employer]. [FreeTextEntry1] : lower leg [de-identified] : concern for nsti\par deep to fascia [TWNoteComboBox1] : Left [de-identified] : Ace wraps

## 2020-11-09 NOTE — HISTORY OF PRESENT ILLNESS
[FreeTextEntry1] : 74 yo female with a left leg  wound\par Has a Vn x past several days who attends daily and cleans wound\par 11/2/20Left leg has been treated with moistened dakins, slight improvement, but remains with slough, debrided  \par On Eliquis- "dark material" in wound\par no fever , finishing levaquin\par path shows necrotizing sti\par denies fever or chills\par Submitted photos - to download- wound described as unchanged, and large\par Methotrexate stopped, but remains on Prednisone for RA

## 2020-11-09 NOTE — PHYSICAL EXAM
[Please See PDF for [employer]] : Please See PDF for [employer]. [de-identified] : seated at table at home, good hygiene [de-identified] : non labored [de-identified] : limited ambulation [de-identified] : awake, alert , conversant [FreeTextEntry1] : lower leg [de-identified] : concern for nsti\par deep to fascia [TWNoteComboBox1] : Left [de-identified] : Ace wraps

## 2020-11-09 NOTE — HISTORY OF PRESENT ILLNESS
[FreeTextEntry1] : 74 yo female with a left leg  wound excisionally debrided twice per week in the office.\par Has a Vn x past several days who attends daily and cleans wound\par On Eliquis- h/o hematoma\par Methotrexate stopped, but remains on Prednisone for RA\par h/o 3 pregnancies and family h/o varicose veins.

## 2020-11-09 NOTE — PLAN
[FreeTextEntry1] : Ms. FLORINDA VALVERDE is a 75 year with persistent and worsening bilateral lower extremity venous insufficiency, CEAP classification C6 which is unresponsive to at least 3 months of compression stockings 20-30 mmHg, leg elevation, exercise and over the counter pain medication_(Ibuprofen).  Patient has complaints of worsening leg discomfort with swelling, fatigue, heaviness,and ulcers.  The patient’s symptoms interfere with their ADL’s, such as walking, shopping and house work, and their ability to work and exercise. Patient has intact pulses in both legs without evidence of arterial insufficiency.  \par \par Treatment is indicated not for cosmetic reasons but for symptomatic venous reflux disease with symptoms which is refractory to conservative therapy. Venous duplex study demonstrates bilateral  lower extremity venous insufficiency. The need for definitive effective treatment is based on severe, interfering and worsening reflux symptoms with evidence of venous insufficiency on venous ultrasound. \par \par Patient is a candidate for endovenous ablation treatment of the bilateral GSV and left SSV. \par The risks and benefits of endovenous ablation treatment versus continued conservative management were discussed with the patient.  Patient chooses endovenous ablation treatments. Treatment plan to be scheduled. \par \par 11/4/20\par Plan -  santyl ordered to vivo, c/w dakins wash and moistened dakins to wound, when santyl arrives, start applying to moistened dakins and apply to wound bed, airam, ace, orders for nurse given\par Follow up 2 days in office for serial debridement\par \par 11/9/2020\par s/p excisional debridement\par Continue antibiotics.

## 2020-11-09 NOTE — ASSESSMENT
[FreeTextEntry1] : 75 yr old discussed prednisone inhibitory effects on wound healing, s/pn methotrexate for RA\par larger, concern for nec fascitis, will send for cbc/ labs , film,possible hbo candidate\par renew levaquin add flagyl for anaerobes, possible PG\par will need vac, > 20% slough\par s/p evacuation of hematoma/abscess\par \par f/u visit Monday\par s/p excisional debridement\par Cultures taken\par Continue daily antiseptic dressing\par Renew levoquin\par   Dr. Ochoa/[phone]\par recommended avoidance of hospitalization at this time during COVID with patient comorbidies\par \par 11/4/20\par Left leg debrided after topical anesthetic and injecteable lidocaine w/epinephrine, slough is becoming more rubbery and looser

## 2020-11-09 NOTE — ASSESSMENT
[FreeTextEntry1] : 75 yr old discussed prednisone inhibitory effects on wound healing, s/pn methotrexate for RA\par larger, concern for nec fascitis, will send for cbc/ labs , film,possible hbo candidate\par renew levaquin add flagyl for anaerobes, possible PG\par will need vac, > 20% slough\par s/p evacuation of hematoma/abscess\par \par f/u visit Monday\par s/p excisional debridement\par Cultures taken\par Continue daily antiseptic dressing\par Renew levoquin\par   Dr. Ochoa/[phone]\par recommended avoidance of hospitalization at this time during COVID with patient comorbidies\par \par 11/9/20\par Left leg debrided after topical anesthetic and injecteable lidocaine w/epinephrine, patient to follow up this Thursday for debridement.  Results of culture discussed.  Patient to follow up with ID.  Venous insufficiency noted.\par

## 2020-11-10 LAB
BASOPHILS # BLD AUTO: 0.05 K/UL
BASOPHILS NFR BLD AUTO: 0.7 %
EOSINOPHIL # BLD AUTO: 0.15 K/UL
EOSINOPHIL NFR BLD AUTO: 2.2 %
ERYTHROCYTE [SEDIMENTATION RATE] IN BLOOD BY WESTERGREN METHOD: 31 MM/HR
HCT VFR BLD CALC: 35.5 %
HGB BLD-MCNC: 10.7 G/DL
IMM GRANULOCYTES NFR BLD AUTO: 1 %
LYMPHOCYTES # BLD AUTO: 1.35 K/UL
LYMPHOCYTES NFR BLD AUTO: 19.5 %
MAN DIFF?: NORMAL
MCHC RBC-ENTMCNC: 28.1 PG
MCHC RBC-ENTMCNC: 30.1 GM/DL
MCV RBC AUTO: 93.2 FL
MONOCYTES # BLD AUTO: 0.77 K/UL
MONOCYTES NFR BLD AUTO: 11.1 %
NEUTROPHILS # BLD AUTO: 4.54 K/UL
NEUTROPHILS NFR BLD AUTO: 65.5 %
PLATELET # BLD AUTO: 317 K/UL
RBC # BLD: 3.81 M/UL
RBC # FLD: 16.6 %
WBC # FLD AUTO: 6.93 K/UL

## 2020-11-12 ENCOUNTER — APPOINTMENT (OUTPATIENT)
Dept: WOUND CARE | Facility: CLINIC | Age: 75
End: 2020-11-12
Payer: MEDICARE

## 2020-11-12 VITALS
TEMPERATURE: 97.4 F | RESPIRATION RATE: 20 BRPM | DIASTOLIC BLOOD PRESSURE: 75 MMHG | SYSTOLIC BLOOD PRESSURE: 117 MMHG | HEART RATE: 78 BPM

## 2020-11-12 PROCEDURE — 99214 OFFICE O/P EST MOD 30 MIN: CPT | Mod: 25

## 2020-11-12 PROCEDURE — 11043 DBRDMT MUSC&/FSCA 1ST 20/<: CPT

## 2020-11-19 ENCOUNTER — APPOINTMENT (OUTPATIENT)
Dept: WOUND CARE | Facility: CLINIC | Age: 75
End: 2020-11-19

## 2020-11-23 ENCOUNTER — APPOINTMENT (OUTPATIENT)
Dept: WOUND CARE | Facility: CLINIC | Age: 75
End: 2020-11-23
Payer: MEDICARE

## 2020-11-23 VITALS
RESPIRATION RATE: 20 BRPM | TEMPERATURE: 97 F | DIASTOLIC BLOOD PRESSURE: 80 MMHG | SYSTOLIC BLOOD PRESSURE: 146 MMHG | HEART RATE: 99 BPM

## 2020-11-23 PROCEDURE — 11042 DBRDMT SUBQ TIS 1ST 20SQCM/<: CPT

## 2020-11-23 PROCEDURE — 11045 DBRDMT SUBQ TISS EACH ADDL: CPT

## 2020-11-23 NOTE — ASSESSMENT
[FreeTextEntry1] : 75 yr old discussed prednisone inhibitory effects on wound healing, s/pn methotrexate for RA\par larger, concern for nec fascitis, will send for cbc/ labs , film,possible hbo candidate\par renew levaquin add flagyl for anaerobes, possible PG\par will need vac, > 20% slough\par s/p evacuation of hematoma/abscess\par \par f/u visit Monday\par s/p excisional debridement\par Cultures taken\par Continue daily antiseptic dressing\par Renew levoquin\par   Dr. Ochoa/[phone]\par recommended avoidance of hospitalization at this time during COVID with patient comorbidies\par \par 11/9/20\par Left leg debrided after topical anesthetic and injecteable lidocaine w/epinephrine, patient to follow up this Thursday for debridement.  Results of culture discussed.  Patient to follow up with ID.  Venous insufficiency noted.\par 11/23/20\par Left leg started with wound vac wed. 11/18/20, wound bed is so much , pink, areas of yellow slough along perimeter, debrided today, tolerating vac, small linear area proximal to wound of intact blisters\par Discussed RFA procedure, submitting paperwork\par Left heel unstageable debrided today\par

## 2020-11-23 NOTE — PHYSICAL EXAM
[Please See PDF for [employer]] : Please See PDF for [employer]. [de-identified] : seated at table at home, good hygiene [de-identified] : non labored [de-identified] : limited ambulation [de-identified] : awake, alert , conversant [FreeTextEntry1] : lower leg [de-identified] : concern for nsti\par deep to fascia [TWNoteComboBox1] : Left [de-identified] : Ace wraps

## 2020-11-23 NOTE — PLAN
[FreeTextEntry1] : Ms. FLORINDA VALVERDE is a 75 year with persistent and worsening bilateral lower extremity venous insufficiency, CEAP classification C6 which is unresponsive to at least 3 months of compression stockings 20-30 mmHg, leg elevation, exercise and over the counter pain medication_(Ibuprofen).  Patient has complaints of worsening leg discomfort with swelling, fatigue, heaviness,and ulcers.  The patient’s symptoms interfere with their ADL’s, such as walking, shopping and house work, and their ability to work and exercise. Patient has intact pulses in both legs without evidence of arterial insufficiency.  \par \par Treatment is indicated not for cosmetic reasons but for symptomatic venous reflux disease with symptoms which is refractory to conservative therapy. Venous duplex study demonstrates bilateral  lower extremity venous insufficiency. The need for definitive effective treatment is based on severe, interfering and worsening reflux symptoms with evidence of venous insufficiency on venous ultrasound. \par \par Patient is a candidate for endovenous ablation treatment of the bilateral GSV and left SSV. \par The risks and benefits of endovenous ablation treatment versus continued conservative management were discussed with the patient.  Patient chooses endovenous ablation treatments. Treatment plan to be scheduled. \par \par 11/23/20\par Plan - orders for nurse given, c/w current care to calf, dakins, santyl, wound vac, santyl to left heel\par to follow up with vascular office regarding schedule of RFA\par follow up 1 week

## 2020-11-23 NOTE — HISTORY OF PRESENT ILLNESS
[FreeTextEntry1] : 76 yo female with a left leg  wound excisionally debrided once per week\par s/p excisional debridement today\par The patient presents with a wound to the left leg-.  Swelling noted to the extremity.  \par GALA/PVR: wnl \par \par No clinical sign of infection\par Recommendation:\par \par Apply lidocaine or topical anesthetic.\par Wash wound with ----0.9% saline/ Dakins 0.125%.or VASHE\par Apply ---- santyl/\par Apply ----VAC\par Change dressing ---/ 3 times per week.\par Leg elevation as tolerated\par Encouraged ambulation or exercise.\par Optimization of nutrition.\par Offloading to the wound site.\par \par -----Homecare orders in place\par \par Follow up appointment scheduled for 1 week\par \par On Eliquis- h/o hematoma\par Methotrexate stopped, but remains on Prednisone for RA\par h/o 3 pregnancies and family h/o varicose veins.

## 2020-11-25 NOTE — ASSESSMENT
[FreeTextEntry1] : 75 yr old discussed prednisone inhibitory effects on wound healing, s/pn methotrexate for RA\par larger, concern for nec fascitis, will send for cbc/ labs , film,possible hbo candidate\par renew levaquin add flagyl for anaerobes, possible PG\par will need vac, > 20% slough\par s/p evacuation of hematoma/abscess\par \par f/u visit Monday\par s/p excisional debridement\par Cultures taken\par Continue daily antiseptic dressing\par Renew levoquin\par   Dr. Ochoa/[phone]\par recommended avoidance of hospitalization at this time during COVID with patient comorbidies\par \par 11/9/20\par Left leg debrided after topical anesthetic and injecteable lidocaine w/epinephrine, patient to follow up this Thursday for debridement.  Results of culture discussed.  Patient to follow up with ID.  Venous insufficiency noted.\par

## 2020-11-25 NOTE — PHYSICAL EXAM
[Please See PDF for [employer]] : Please See PDF for [employer]. [de-identified] : seated at table at home, good hygiene [de-identified] : non labored [de-identified] : limited ambulation [de-identified] : awake, alert , conversant [FreeTextEntry1] : lower leg [de-identified] : concern for nsti\par deep to fascia [TWNoteComboBox1] : Left [de-identified] : Ace wraps

## 2020-11-25 NOTE — PLAN
[FreeTextEntry1] : Ms. FLORINDA VALVERDE is a 75 year with persistent and worsening bilateral lower extremity venous insufficiency, CEAP classification C6 which is unresponsive to at least 3 months of compression stockings 20-30 mmHg, leg elevation, exercise and over the counter pain medication_(Ibuprofen).  Patient has complaints of worsening leg discomfort with swelling, fatigue, heaviness,and ulcers.  The patient’s symptoms interfere with their ADL’s, such as walking, shopping and house work, and their ability to work and exercise. Patient has intact pulses in both legs without evidence of arterial insufficiency.  \par \par Treatment is indicated not for cosmetic reasons but for symptomatic venous reflux disease with symptoms which is refractory to conservative therapy. Venous duplex study demonstrates bilateral  lower extremity venous insufficiency. The need for definitive effective treatment is based on severe, interfering and worsening reflux symptoms with evidence of venous insufficiency on venous ultrasound. \par \par Patient is a candidate for endovenous ablation treatment of the bilateral GSV and left SSV. \par The risks and benefits of endovenous ablation treatment versus continued conservative management were discussed with the patient.  Patient chooses endovenous ablation treatments. Treatment plan to be scheduled. \par \par 11/4/20\par Plan -  santyl ordered to vivo, c/w dakins wash and moistened dakins to wound, when santyl arrives, start applying to moistened dakins and apply to wound bed, airam, ace, orders for nurse given\par Follow up 2 days in office for serial debridement\par \par 11/9/2020\par s/p excisional debridement\par Continue antibiotics.\par order vac

## 2020-11-30 ENCOUNTER — APPOINTMENT (OUTPATIENT)
Dept: WOUND CARE | Facility: CLINIC | Age: 75
End: 2020-11-30
Payer: MEDICARE

## 2020-11-30 VITALS — DIASTOLIC BLOOD PRESSURE: 82 MMHG | RESPIRATION RATE: 18 BRPM | HEART RATE: 96 BPM | SYSTOLIC BLOOD PRESSURE: 140 MMHG

## 2020-11-30 PROCEDURE — 11045 DBRDMT SUBQ TISS EACH ADDL: CPT

## 2020-11-30 PROCEDURE — 11042 DBRDMT SUBQ TIS 1ST 20SQCM/<: CPT

## 2020-11-30 NOTE — PLAN
[FreeTextEntry1] : Ms. FLORINDA VALVERDE is a 75 year with persistent and worsening bilateral lower extremity venous insufficiency, CEAP classification C6 which is unresponsive to at least 3 months of compression stockings 20-30 mmHg, leg elevation, exercise and over the counter pain medication_(Ibuprofen).  Patient has complaints of worsening leg discomfort with swelling, fatigue, heaviness,and ulcers.  The patient’s symptoms interfere with their ADL’s, such as walking, shopping and house work, and their ability to work and exercise. Patient has intact pulses in both legs without evidence of arterial insufficiency.  \par \par Treatment is indicated not for cosmetic reasons but for symptomatic venous reflux disease with symptoms which is refractory to conservative therapy. Venous duplex study demonstrates bilateral  lower extremity venous insufficiency. The need for definitive effective treatment is based on severe, interfering and worsening reflux symptoms with evidence of venous insufficiency on venous ultrasound. \par \par Patient is a candidate for endovenous ablation treatment of the bilateral GSV and left SSV. \par The risks and benefits of endovenous ablation treatment versus continued conservative management were discussed with the patient.  \par 11/30/20\par Plan - c/w vac, betadine to heel, orders for nurse given\par Follow up next week\par \par

## 2020-11-30 NOTE — ASSESSMENT
[FreeTextEntry1] : 75 yr old discussed prednisone inhibitory effects on wound healing, s/pn methotrexate for RA\par larger, concern for nec fascitis, will send for cbc/ labs , film,possible hbo candidate\par renew levaquin add flagyl for anaerobes, possible PG\par will need vac, > 20% slough\par s/p evacuation of hematoma/abscess\par \par f/u visit Monday\par s/p excisional debridement\par Cultures taken\par Continue daily antiseptic dressing\par Renew levoquin\par   Dr. Ochoa/[phone]\par recommended avoidance of hospitalization at this time during COVID with patient comorbidies\par \par 11/9/20\par Left leg debrided after topical anesthetic and injecteable lidocaine w/epinephrine, patient to follow up this Thursday for debridement.  Results of culture discussed.  Patient to follow up with ID.  Venous insufficiency noted.\par 11/23/20\par Left leg started with wound vac wed. 11/18/20, wound bed is so much , pink, areas of yellow slough along perimeter, debrided today, tolerating vac, small linear area proximal to wound of intact blisters\par Discussed RFA procedure, submitting paperwork\par Left heel unstageable debrided today\par 11/30/20\par Left leg, has had issues with vac supplies, straightened out now, left heel treated with santyl, debrided today\par

## 2020-11-30 NOTE — PHYSICAL EXAM
[Please See PDF for [employer]] : Please See PDF for [employer]. [de-identified] : seated at table at home, good hygiene [de-identified] : non labored [de-identified] : limited ambulation [de-identified] : awake, alert , conversant [FreeTextEntry1] : lower leg [de-identified] : concern for nsti\par deep to fascia [TWNoteComboBox1] : Left [de-identified] : Ace wraps

## 2020-12-07 ENCOUNTER — APPOINTMENT (OUTPATIENT)
Dept: WOUND CARE | Facility: CLINIC | Age: 75
End: 2020-12-07
Payer: MEDICARE

## 2020-12-07 ENCOUNTER — APPOINTMENT (OUTPATIENT)
Dept: INFECTIOUS DISEASE | Facility: CLINIC | Age: 75
End: 2020-12-07

## 2020-12-07 VITALS
WEIGHT: 200 LBS | DIASTOLIC BLOOD PRESSURE: 81 MMHG | HEART RATE: 96 BPM | BODY MASS INDEX: 35.44 KG/M2 | TEMPERATURE: 96.2 F | HEIGHT: 63 IN | SYSTOLIC BLOOD PRESSURE: 128 MMHG

## 2020-12-07 PROCEDURE — 11045 DBRDMT SUBQ TISS EACH ADDL: CPT

## 2020-12-07 PROCEDURE — 11042 DBRDMT SUBQ TIS 1ST 20SQCM/<: CPT

## 2020-12-07 NOTE — HISTORY OF PRESENT ILLNESS
[FreeTextEntry1] : Ms. FLORINDA VALVERDE   presents to the office with a recurrent wound for several months duration. Left leg ulcer since 10/9/2020.   \par \par \par Prior, The wound is located on  the Right lower extremity.  The patient has complaints of pain, weeping and swelling. The patient has been dressing the wound with unna boots. The patient denies fevers or chills.  The patient has localized pain to the wound upon dressing changes.  The patient has no other complaints or associated symptoms.

## 2020-12-07 NOTE — PHYSICAL EXAM
[Please See PDF for [employer]] : Please See PDF for [employer]. [de-identified] : seated at table at home, good hygiene [de-identified] : non labored [de-identified] : limited ambulation [de-identified] : awake, alert , conversant [FreeTextEntry1] : lower leg [de-identified] : concern for nsti\par deep to fascia [TWNoteComboBox1] : Left [de-identified] : Ace wraps

## 2020-12-07 NOTE — HISTORY OF PRESENT ILLNESS
[FreeTextEntry1] : 74 yo female with a left leg  wound excisionally debrided once per week\par s/p excisional debridement today\par The patient presents with a wound to the left leg-.  Swelling noted to the extremity.  \par GALA/PVR: wnl \par \par No clinical sign of infection\par Recommendation:\par \par Apply lidocaine or topical anesthetic.\par Wash wound with ----0.9% saline/ Dakins 0.125%.or VASHE\par Apply ---- santyl/\par Apply ----VAC\par Change dressing ---/ 3 times per week.\par Leg elevation as tolerated\par Encouraged ambulation or exercise.\par Optimization of nutrition.\par Offloading to the wound site.\par \par -----Homecare orders in place\par \par Follow up appointment scheduled for 1 week\par \par On Eliquis- h/o hematoma\par Methotrexate stopped, but remains on Prednisone for RA\par h/o 3 pregnancies and family h/o varicose veins.

## 2020-12-07 NOTE — PHYSICAL EXAM
[Normal Breath Sounds] : Normal breath sounds [2+] : left 2+ [Ankle Swelling (On Exam)] : present [Ankle Swelling Bilaterally] : bilaterally  [] : bilaterally [Ankle Swelling On The Left] : moderate [Alert] : alert [Oriented to Person] : oriented to person [Oriented to Place] : oriented to place [Oriented to Time] : oriented to time [Calm] : calm [FreeTextEntry1] : leg [FreeTextEntry2] : 0 [FreeTextEntry3] : 0 [FreeTextEntry4] : 0 [TWNoteComboBox1] : Right [JVD] : no jugular venous distention  [Varicose Veins Of Lower Extremities] : not present [Abdomen Tenderness] : ~T ~M No abdominal tenderness [Purpura] : no purpura  [Petechiae] : no petechiae [Skin Ulcer] : no ulcer [Skin Induration] : no induration [de-identified] : appears well , ambulatory [de-identified] : NAD [de-identified] : supple [de-identified] : soft, obese [de-identified] : bilateral lower extremity edema

## 2020-12-07 NOTE — ASSESSMENT
[FreeTextEntry1] : The patient presents with a wound to the lower extremity now healed.  Swelling noted to the extremity secondary to venous insufficiency and lymphedema.  \par \par No clinical sign of infection\par Recommendation:\par Venous ablation\par Leg elevation as tolerated\par Encouraged ambulation or exercise.\par Optimization of nutrition.\par Offloading to the wound site.\par \par Follow up appointment scheduled for prn\par Conservative methods have been tried and failed such as stockings, elevation.\par Lymphedema is chronic and referring patient for a lymphedema pump and bilateral leg sleeves.\par \par 10/19/2020\par Patient underwent evaluation for peripheral arterial disease using a Juliet QRuso diagnostic machine.  Ankle brachial index was obtained using blood pressure cuffs of the ankle and brachial segments of both legs.  A distal pulse volume recording was noted digitally on the device.  The procedure was supervised and interpreted by the physician.  GALA of the right lower extremity 1.15.   GALA of the left lower extremity 1.17.  Waveform noted to be , biphasic.  Patient tolerated procedure well in the office.  Results discussed with the patient.\par

## 2020-12-07 NOTE — PLAN
[FreeTextEntry1] : Ms. FLORINDA VALVERDE is a 75 year with persistent and worsening bilateral lower extremity venous insufficiency, CEAP classification C6 which is unresponsive to at least 3 months of compression stockings 20-30 mmHg, leg elevation, exercise and over the counter pain medication_(Ibuprofen).  Patient has complaints of worsening leg discomfort with swelling, fatigue, heaviness,and ulcers.  The patient’s symptoms interfere with their ADL’s, such as walking, shopping and house work, and their ability to work and exercise. Patient has intact pulses in both legs without evidence of arterial insufficiency.  \par \par Treatment is indicated not for cosmetic reasons but for symptomatic venous reflux disease with symptoms which is refractory to conservative therapy. Venous duplex study demonstrates bilateral  lower extremity venous insufficiency. The need for definitive effective treatment is based on severe, interfering and worsening reflux symptoms with evidence of venous insufficiency on venous ultrasound. \par \par Patient is a candidate for endovenous ablation treatment of the bilateral GSV and left SSV. \par The risks and benefits of endovenous ablation treatment versus continued conservative management were discussed with the patient.  \par 12/7/20\par Plan - c/w vac, betadine to heel, orders for nurse given, lymphadema pump to be ordered\par Follow up next week\par \par

## 2020-12-07 NOTE — ASSESSMENT
[FreeTextEntry1] : 75 yr old discussed prednisone inhibitory effects on wound healing, s/pn methotrexate for RA\par larger, concern for nec fascitis, will send for cbc/ labs , film,possible hbo candidate\par renew levaquin add flagyl for anaerobes, possible PG\par will need vac, > 20% slough\par s/p evacuation of hematoma/abscess\par \par f/u visit Monday\par s/p excisional debridement\par Cultures taken\par Continue daily antiseptic dressing\par Renew levoquin\par   Dr. Ochoa/[phone]\par recommended avoidance of hospitalization at this time during COVID with patient comorbidies\par \par 11/9/20\par Left leg debrided after topical anesthetic and injecteable lidocaine w/epinephrine, patient to follow up this Thursday for debridement.  Results of culture discussed.  Patient to follow up with ID.  Venous insufficiency noted.\par 11/23/20\par Left leg started with wound vac wed. 11/18/20, wound bed is so much , pink, areas of yellow slough along perimeter, debrided today, tolerating vac, small linear area proximal to wound of intact blisters\par Discussed RFA procedure, submitting paperwork\par Left heel unstageable debrided today\par 11/30/20\par Left leg, has had issues with vac supplies, straightened out now, left heel treated with santyl, debrided today\par 12/7/20\par left leg debrided today, continues to improve, 80 % pink granulation tissue, has copious drainage, goes through 1 cannister a day, left lateral heel drying up, betadine applied\par Both legs edematous, compliant with diuretic

## 2020-12-08 ENCOUNTER — APPOINTMENT (OUTPATIENT)
Dept: INTERNAL MEDICINE | Facility: CLINIC | Age: 75
End: 2020-12-08
Payer: MEDICARE

## 2020-12-08 VITALS
OXYGEN SATURATION: 93 % | DIASTOLIC BLOOD PRESSURE: 84 MMHG | TEMPERATURE: 98.2 F | HEART RATE: 125 BPM | HEIGHT: 63 IN | SYSTOLIC BLOOD PRESSURE: 120 MMHG | BODY MASS INDEX: 35.44 KG/M2 | WEIGHT: 200 LBS

## 2020-12-08 DIAGNOSIS — Z23 ENCOUNTER FOR IMMUNIZATION: ICD-10-CM

## 2020-12-08 DIAGNOSIS — M19.90 UNSPECIFIED OSTEOARTHRITIS, UNSPECIFIED SITE: ICD-10-CM

## 2020-12-08 PROCEDURE — 99214 OFFICE O/P EST MOD 30 MIN: CPT | Mod: 25

## 2020-12-08 PROCEDURE — 36415 COLL VENOUS BLD VENIPUNCTURE: CPT

## 2020-12-08 PROCEDURE — G0439: CPT

## 2020-12-08 RX ORDER — METRONIDAZOLE 500 MG/1
500 TABLET ORAL 3 TIMES DAILY
Qty: 21 | Refills: 0 | Status: DISCONTINUED | COMMUNITY
Start: 2020-11-12 | End: 2020-12-08

## 2020-12-08 NOTE — HISTORY OF PRESENT ILLNESS
[de-identified] : Annual Exam\par Decline vaccine\par \par Blood pressure on med\par Chronic wound left leg\par under care with wound vac\par may need vein procedure\par under care of cariology for PAF\par on eliques and metoprolol\par Rheumatoid arthitits on prednisone\par Hisotry of gout and edema of leg\par wear support stocking on " good leg"

## 2020-12-08 NOTE — PHYSICAL EXAM
[Normal] : no respiratory distress, lungs were clear to auscultation bilaterally and no accessory muscle use [de-identified] : sinus tacycardia [de-identified] : trace edema on right left bandaged

## 2020-12-08 NOTE — HEALTH RISK ASSESSMENT
[Fair] :  ~his/her~ mood as fair [No] : No [Never (0 pts)] : Never (0 points) [Any fall with injury in past year] : Patient reported fall with injury in the past year [0] : 1) Little interest or pleasure doing things: Not at all (0) [1] : 2) Feeling down, depressed, or hopeless for several days (1) [None] : None [With Family] : lives with family [College] : College [] :  [Feels Safe at Home] : Feels safe at home [Fully functional (bathing, dressing, toileting, transferring, walking, feeding)] : Fully functional (bathing, dressing, toileting, transferring, walking, feeding) [Independent] : managing medications [Some assistance needed] : managing finances [Full assistance needed] : using transportation [Smoke Detector] : smoke detector [Carbon Monoxide Detector] : carbon monoxide detector [Seat Belt] :  uses seat belt [[id number]] : 1 [Change in mental status noted] : No change in mental status noted [Language] : denies difficulty with language [Behavior] : denies difficulty with behavior [Learning/Retaining New Information] : denies difficulty learning/retaining new information [Handling Complex Tasks] : denies difficulty handling complex tasks [Reasoning] : denies difficulty with reasoning [Spatial Ability and Orientation] : denies difficulty with spatial ability and orientation [Reports changes in hearing] : Reports no changes in hearing [Reports changes in vision] : Reports no changes in vision [Guns at Home] : no guns at home

## 2020-12-08 NOTE — PLAN
[FreeTextEntry1] : Annual exam\par decline vaccine\par aged out of other\par \par Blood pressure good on med\par chronic wound leg under care\par History of paf under care\par venous disease and swelling under care\par bp good\par no recent gout\par Blood evalaution

## 2020-12-08 NOTE — REVIEW OF SYSTEMS
[Lower Ext Edema] : lower extremity edema [Nocturia] : nocturia [Frequency] : frequency [Joint Pain] : joint pain [Muscle Pain] : muscle pain [Negative] : Gastrointestinal [Chest Pain] : no chest pain [Orthopnea] : no orthopnea [Shortness Of Breath] : no shortness of breath [Wheezing] : no wheezing [FreeTextEntry6] : histoyr of paf

## 2020-12-10 LAB
ALBUMIN SERPL ELPH-MCNC: 4 G/DL
ALP BLD-CCNC: 90 U/L
ALT SERPL-CCNC: 8 U/L
ANION GAP SERPL CALC-SCNC: 14 MMOL/L
AST SERPL-CCNC: 11 U/L
BASOPHILS # BLD AUTO: 0.07 K/UL
BASOPHILS NFR BLD AUTO: 0.7 %
BILIRUB SERPL-MCNC: 0.4 MG/DL
BUN SERPL-MCNC: 19 MG/DL
CALCIUM SERPL-MCNC: 10.1 MG/DL
CHLORIDE SERPL-SCNC: 98 MMOL/L
CHOLEST SERPL-MCNC: 193 MG/DL
CO2 SERPL-SCNC: 27 MMOL/L
CREAT SERPL-MCNC: 0.56 MG/DL
EOSINOPHIL # BLD AUTO: 0.16 K/UL
EOSINOPHIL NFR BLD AUTO: 1.7 %
ESTIMATED AVERAGE GLUCOSE: 117 MG/DL
GLUCOSE SERPL-MCNC: 100 MG/DL
HBA1C MFR BLD HPLC: 5.7 %
HCT VFR BLD CALC: 40.1 %
HDLC SERPL-MCNC: 70 MG/DL
HGB BLD-MCNC: 12.3 G/DL
IMM GRANULOCYTES NFR BLD AUTO: 1.9 %
LDLC SERPL CALC-MCNC: 99 MG/DL
LYMPHOCYTES # BLD AUTO: 0.95 K/UL
LYMPHOCYTES NFR BLD AUTO: 9.9 %
MAN DIFF?: NORMAL
MCHC RBC-ENTMCNC: 29.6 PG
MCHC RBC-ENTMCNC: 30.7 GM/DL
MCV RBC AUTO: 96.6 FL
MONOCYTES # BLD AUTO: 0.79 K/UL
MONOCYTES NFR BLD AUTO: 8.2 %
NEUTROPHILS # BLD AUTO: 7.48 K/UL
NEUTROPHILS NFR BLD AUTO: 77.6 %
NONHDLC SERPL-MCNC: 123 MG/DL
PLATELET # BLD AUTO: 378 K/UL
POTASSIUM SERPL-SCNC: 4.1 MMOL/L
PROT SERPL-MCNC: 7.2 G/DL
RBC # BLD: 4.15 M/UL
RBC # FLD: 16.2 %
SODIUM SERPL-SCNC: 139 MMOL/L
T4 FREE SERPL-MCNC: 1.3 NG/DL
TRIGL SERPL-MCNC: 122 MG/DL
TSH SERPL-ACNC: 2.16 UIU/ML
URATE SERPL-MCNC: 9.1 MG/DL
WBC # FLD AUTO: 9.63 K/UL

## 2020-12-11 ENCOUNTER — NON-APPOINTMENT (OUTPATIENT)
Age: 75
End: 2020-12-11

## 2020-12-14 ENCOUNTER — APPOINTMENT (OUTPATIENT)
Dept: WOUND CARE | Facility: CLINIC | Age: 75
End: 2020-12-14
Payer: MEDICARE

## 2020-12-14 VITALS
HEIGHT: 63 IN | SYSTOLIC BLOOD PRESSURE: 130 MMHG | HEART RATE: 92 BPM | TEMPERATURE: 96.2 F | BODY MASS INDEX: 35.44 KG/M2 | WEIGHT: 200 LBS | DIASTOLIC BLOOD PRESSURE: 79 MMHG

## 2020-12-14 PROCEDURE — 11045 DBRDMT SUBQ TISS EACH ADDL: CPT

## 2020-12-14 PROCEDURE — 11042 DBRDMT SUBQ TIS 1ST 20SQCM/<: CPT

## 2020-12-14 NOTE — PHYSICAL EXAM
[de-identified] : seated at table at home, good hygiene [de-identified] : non labored [de-identified] : limited ambulation [de-identified] : awake, alert , conversant [Please See PDF for [employer]] : Please See PDF for [employer]. [TWNoteComboBox1] : False [de-identified] : False

## 2020-12-14 NOTE — ASSESSMENT
[FreeTextEntry1] : 75 yr old discussed prednisone inhibitory effects on wound healing, s/pn methotrexate for RA\par larger, concern for nec fascitis, will send for cbc/ labs , film,possible hbo candidate\par renew levaquin add flagyl for anaerobes, possible PG\par will need vac, > 20% slough\par s/p evacuation of hematoma/abscess\par \par f/u visit Monday\par s/p excisional debridement\par Cultures taken\par Continue daily antiseptic dressing\par Renew levoquin\par   Dr. Ochoa/[phone]\par recommended avoidance of hospitalization at this time during COVID with patient comorbidies\par \par 11/9/20\par Left leg debrided after topical anesthetic and injecteable lidocaine w/epinephrine, patient to follow up this Thursday for debridement.  Results of culture discussed.  Patient to follow up with ID.  Venous insufficiency noted.\par 11/23/20\par Left leg started with wound vac wed. 11/18/20, wound bed is so much , pink, areas of yellow slough along perimeter, debrided today, tolerating vac, small linear area proximal to wound of intact blisters\par Discussed RFA procedure, submitting paperwork\par Left heel unstageable debrided today\par 11/30/20\par Left leg, has had issues with vac supplies, straightened out now, left heel treated with santyl, debrided today\par 12/7/20\par left leg debrided today, continues to improve, 80 % pink granulation tissue, has copious drainage, goes through 1 cannister a day, left lateral heel drying up, betadine applied\par Both legs edematous, compliant with diuretic\par \par 12/14/2020\par s/p excisional debridement. today\par Epioma applied with foam and multilayer dressing\par No clinical sign of infection\par She read the consent and signed it prior to the initiation of any screening procedures.  She had the opportunity to discuss any questions regarding the study.  I witnessed the signing of the consent and the patient was given a copy of the signed consent.\par

## 2020-12-17 ENCOUNTER — APPOINTMENT (OUTPATIENT)
Dept: INFECTIOUS DISEASE | Facility: CLINIC | Age: 75
End: 2020-12-17

## 2020-12-17 ENCOUNTER — NON-APPOINTMENT (OUTPATIENT)
Age: 75
End: 2020-12-17

## 2020-12-21 ENCOUNTER — APPOINTMENT (OUTPATIENT)
Dept: WOUND CARE | Facility: CLINIC | Age: 75
End: 2020-12-21
Payer: MEDICARE

## 2020-12-21 VITALS
TEMPERATURE: 97.1 F | DIASTOLIC BLOOD PRESSURE: 80 MMHG | RESPIRATION RATE: 16 BRPM | SYSTOLIC BLOOD PRESSURE: 134 MMHG | HEART RATE: 92 BPM

## 2020-12-21 PROCEDURE — 11045 DBRDMT SUBQ TISS EACH ADDL: CPT

## 2020-12-21 PROCEDURE — G0179 MD RECERTIFICATION HHA PT: CPT

## 2020-12-21 PROCEDURE — 11042 DBRDMT SUBQ TIS 1ST 20SQCM/<: CPT

## 2020-12-21 NOTE — PHYSICAL EXAM
[Please See PDF for [employer]] : Please See PDF for [employer]. [de-identified] : seated at table at home, good hygiene [de-identified] : non labored [de-identified] : limited ambulation [de-identified] : awake, alert , conversant

## 2020-12-21 NOTE — ASSESSMENT
[FreeTextEntry1] : 75 yr old discussed prednisone inhibitory effects on wound healing, s/pn methotrexate for RA\par larger, concern for nec fascitis, will send for cbc/ labs , film,possible hbo candidate\par renew levaquin add flagyl for anaerobes, possible PG\par will need vac, > 20% slough\par s/p evacuation of hematoma/abscess\par \par f/u visit Monday\par s/p excisional debridement\par Cultures taken\par Continue daily antiseptic dressing\par Renew levoquin\par   Dr. Ochoa/[phone]\par recommended avoidance of hospitalization at this time during COVID with patient comorbidies\par \par 11/9/20\par Left leg debrided after topical anesthetic and injecteable lidocaine w/epinephrine, patient to follow up this Thursday for debridement.  Results of culture discussed.  Patient to follow up with ID.  Venous insufficiency noted.\par 11/23/20\par Left leg started with wound vac wed. 11/18/20, wound bed is so much , pink, areas of yellow slough along perimeter, debrided today, tolerating vac, small linear area proximal to wound of intact blisters\par Discussed RFA procedure, submitting paperwork\par Left heel unstageable debrided today\par 11/30/20\par Left leg, has had issues with vac supplies, straightened out now, left heel treated with santyl, debrided today\par 12/7/20\par left leg debrided today, continues to improve, 80 % pink granulation tissue, has copious drainage, goes through 1 cannister a day, left lateral heel drying up, betadine applied\par Both legs edematous, compliant with diuretic\par \par 12/14/2020\par s/p excisional debridement. today\par Epioma applied with foam and multilayer dressing\par No clinical sign of infection\par She read the consent and signed it prior to the initiation of any screening procedures.  She had the opportunity to discuss any questions regarding the study.  I witnessed the signing of the consent and the patient was given a copy of the signed consent.\par 12/21/20\par Pt. with large amount of drainage, will be pulled out of epiona study due to that, received lymphedema pump, has not started using yet, wound with moist slough over wound, especially concentrated to edges, debrided today, slough on edges, left heel with dry skin, debrided left heel, removed callous ring around wound, and dry eschar, exposed tissue is clean and pink\par

## 2020-12-21 NOTE — PLAN
[FreeTextEntry1] : Ms. FLORINDA VALVERDE is a 75 year with persistent and worsening bilateral lower extremity venous insufficiency, CEAP classification C6 which is unresponsive to at least 3 months of compression stockings 20-30 mmHg, leg elevation, exercise and over the counter pain medication_(Ibuprofen).  Patient has complaints of worsening leg discomfort with swelling, fatigue, heaviness,and ulcers.  The patient’s symptoms interfere with their ADL’s, such as walking, shopping and house work, and their ability to work and exercise. Patient has intact pulses in both legs without evidence of arterial insufficiency.  \par \par Treatment is indicated not for cosmetic reasons but for symptomatic venous reflux disease with symptoms which is refractory to conservative therapy. Venous duplex study demonstrates bilateral  lower extremity venous insufficiency. The need for definitive effective treatment is based on severe, interfering and worsening reflux symptoms with evidence of venous insufficiency on venous ultrasound. \par \par Patient is a candidate for endovenous ablation treatment of the bilateral GSV and left SSV. \par The risks and benefits of endovenous ablation treatment versus continued conservative management were discussed with the patient.  \par 12/21/20\par Plan - left heel clean with saline, santyl, aquacel, calf wound apply santyl to yellow, xtrasorb, dynaflex, orders for nurse given\par start using lymphedema pump daily at lower setting 40-45\par follow up next week\par \par

## 2020-12-24 ENCOUNTER — NON-APPOINTMENT (OUTPATIENT)
Age: 75
End: 2020-12-24

## 2020-12-24 LAB
BACTERIA SPEC CULT: ABNORMAL
CORE LAB BIOPSY: NORMAL

## 2020-12-28 ENCOUNTER — APPOINTMENT (OUTPATIENT)
Dept: WOUND CARE | Facility: CLINIC | Age: 75
End: 2020-12-28
Payer: MEDICARE

## 2020-12-28 VITALS
DIASTOLIC BLOOD PRESSURE: 76 MMHG | RESPIRATION RATE: 16 BRPM | TEMPERATURE: 96.7 F | SYSTOLIC BLOOD PRESSURE: 122 MMHG | HEART RATE: 66 BPM

## 2020-12-28 PROCEDURE — 29581 APPL MULTLAYER CMPRN SYS LEG: CPT | Mod: LT

## 2020-12-28 NOTE — ASSESSMENT
[FreeTextEntry1] : 75 yr old discussed prednisone inhibitory effects on wound healing, s/pn methotrexate for RA\par larger, concern for nec fascitis, will send for cbc/ labs , film,possible hbo candidate\par renew levaquin add flagyl for anaerobes, possible PG\par will need vac, > 20% slough\par s/p evacuation of hematoma/abscess\par \par f/u visit Monday\par s/p excisional debridement\par Cultures taken\par Continue daily antiseptic dressing\par Renew levoquin\par   Dr. Ochoa/[phone]\par recommended avoidance of hospitalization at this time during COVID with patient comorbidies\par \par 11/9/20\par Left leg debrided after topical anesthetic and injecteable lidocaine w/epinephrine, patient to follow up this Thursday for debridement.  Results of culture discussed.  Patient to follow up with ID.  Venous insufficiency noted.\par 11/23/20\par Left leg started with wound vac wed. 11/18/20, wound bed is so much , pink, areas of yellow slough along perimeter, debrided today, tolerating vac, small linear area proximal to wound of intact blisters\par Discussed RFA procedure, submitting paperwork\par Left heel unstageable debrided today\par 11/30/20\par Left leg, has had issues with vac supplies, straightened out now, left heel treated with santyl, debrided today\par 12/7/20\par left leg debrided today, continues to improve, 80 % pink granulation tissue, has copious drainage, goes through 1 cannister a day, left lateral heel drying up, betadine applied\par Both legs edematous, compliant with diuretic\par \par 12/14/2020\par s/p excisional debridement. today\par Epioma applied with foam and multilayer dressing\par No clinical sign of infection\par She read the consent and signed it prior to the initiation of any screening procedures.  She had the opportunity to discuss any questions regarding the study.  I witnessed the signing of the consent and the patient was given a copy of the signed consent.\par 12/21/20\par Pt. with large amount of drainage, will be pulled out of epiona study due to that, received lymphedema pump, has not started using yet, wound with moist slough over wound, especially concentrated to edges, debrided today, slough on edges, left heel with dry skin, debrided left heel, removed callous ring around wound, and dry eschar, exposed tissue is clean and pink\par 12/28/20\par Left leg less swollen has been using lymphedema pump, drainage is large, has not been taking diuretic, 3 layer compression garment\par

## 2020-12-28 NOTE — PLAN
[FreeTextEntry1] : Ms. FLORINDA VALVERDE is a 75 year with persistent and worsening bilateral lower extremity venous insufficiency, CEAP classification C6 which is unresponsive to at least 3 months of compression stockings 20-30 mmHg, leg elevation, exercise and over the counter pain medication_(Ibuprofen).  Patient has complaints of worsening leg discomfort with swelling, fatigue, heaviness,and ulcers.  The patient’s symptoms interfere with their ADL’s, such as walking, shopping and house work, and their ability to work and exercise. Patient has intact pulses in both legs without evidence of arterial insufficiency.  \par \par Treatment is indicated not for cosmetic reasons but for symptomatic venous reflux disease with symptoms which is refractory to conservative therapy. Venous duplex study demonstrates bilateral  lower extremity venous insufficiency. The need for definitive effective treatment is based on severe, interfering and worsening reflux symptoms with evidence of venous insufficiency on venous ultrasound. \par \par Patient is a candidate for endovenous ablation treatment of the bilateral GSV and left SSV. \par The risks and benefits of endovenous ablation treatment versus continued conservative management were discussed with the patient.  \par 12/21/20\par Plan - left heel clean with saline, santyl, aquacel, calf wound apply santyl to yellow, xtrasorb, dynaflex, orders for nurse given\par start using lymphedema pump daily at lower setting 40-45\par follow up next week\par 12/28/20\par Plan - orders for nurse given, c/w same treatment\par follow up next week\par \par

## 2020-12-28 NOTE — PHYSICAL EXAM
[Please See PDF for [employer]] : Please See PDF for [employer]. [de-identified] : seated at table at home, good hygiene [de-identified] : non labored [de-identified] : limited ambulation [de-identified] : awake, alert , conversant

## 2020-12-31 ENCOUNTER — NON-APPOINTMENT (OUTPATIENT)
Age: 75
End: 2020-12-31

## 2021-01-04 ENCOUNTER — APPOINTMENT (OUTPATIENT)
Dept: WOUND CARE | Facility: CLINIC | Age: 76
End: 2021-01-04
Payer: MEDICARE

## 2021-01-04 PROCEDURE — 11042 DBRDMT SUBQ TIS 1ST 20SQCM/<: CPT

## 2021-01-04 PROCEDURE — 11045 DBRDMT SUBQ TISS EACH ADDL: CPT

## 2021-01-04 NOTE — PLAN
[FreeTextEntry1] : Ms. FLORINDA VALVERDE is a 75 year with persistent and worsening bilateral lower extremity venous insufficiency, CEAP classification C6 which is unresponsive to at least 3 months of compression stockings 20-30 mmHg, leg elevation, exercise and over the counter pain medication_(Ibuprofen).  Patient has complaints of worsening leg discomfort with swelling, fatigue, heaviness,and ulcers.  The patient’s symptoms interfere with their ADL’s, such as walking, shopping and house work, and their ability to work and exercise. Patient has intact pulses in both legs without evidence of arterial insufficiency.  \par \par Treatment is indicated not for cosmetic reasons but for symptomatic venous reflux disease with symptoms which is refractory to conservative therapy. Venous duplex study demonstrates bilateral  lower extremity venous insufficiency. The need for definitive effective treatment is based on severe, interfering and worsening reflux symptoms with evidence of venous insufficiency on venous ultrasound. \par \par Patient is a candidate for endovenous ablation treatment of the bilateral GSV and left SSV. \par The risks and benefits of endovenous ablation treatment versus continued conservative management were discussed with the patient.  \par 1/4/21\par Plan - d/c santyl, xtrasorb, dynaflex, c/w pump, leg elevation\par Pt. will call kit to f/u for RFA\par Follow up in office

## 2021-01-04 NOTE — ASSESSMENT
[FreeTextEntry1] : 75 yr old discussed prednisone inhibitory effects on wound healing, s/pn methotrexate for RA\par larger, concern for nec fascitis, will send for cbc/ labs , film,possible hbo candidate\par renew levaquin add flagyl for anaerobes, possible PG\par will need vac, > 20% slough\par s/p evacuation of hematoma/abscess\par \par f/u visit Monday\par s/p excisional debridement\par Cultures taken\par Continue daily antiseptic dressing\par Renew levoquin\par   Dr. Ochoa/[phone]\par recommended avoidance of hospitalization at this time during COVID with patient comorbidies\par \par 11/9/20\par Left leg debrided after topical anesthetic and injecteable lidocaine w/epinephrine, patient to follow up this Thursday for debridement.  Results of culture discussed.  Patient to follow up with ID.  Venous insufficiency noted.\par 11/23/20\par Left leg started with wound vac wed. 11/18/20, wound bed is so much , pink, areas of yellow slough along perimeter, debrided today, tolerating vac, small linear area proximal to wound of intact blisters\par Discussed RFA procedure, submitting paperwork\par Left heel unstageable debrided today\par 11/30/20\par Left leg, has had issues with vac supplies, straightened out now, left heel treated with santyl, debrided today\par 12/7/20\par left leg debrided today, continues to improve, 80 % pink granulation tissue, has copious drainage, goes through 1 cannister a day, left lateral heel drying up, betadine applied\par Both legs edematous, compliant with diuretic\par \par 12/14/2020\par s/p excisional debridement. today\par Epioma applied with foam and multilayer dressing\par No clinical sign of infection\par She read the consent and signed it prior to the initiation of any screening procedures.  She had the opportunity to discuss any questions regarding the study.  I witnessed the signing of the consent and the patient was given a copy of the signed consent.\par 12/21/20\par Pt. with large amount of drainage, will be pulled out of epiona study due to that, received lymphedema pump, has not started using yet, wound with moist slough over wound, especially concentrated to edges, debrided today, slough on edges, left heel with dry skin, debrided left heel, removed callous ring around wound, and dry eschar, exposed tissue is clean and pink\par 12/28/20\par Left leg less swollen has been using lymphedema pump, drainage is large, has not been taking diuretic, 3 layer compression garment\par 1/4/21\par left leg clean and pink, debrided of biofilm and slough, continues to improve, drainage is large, using lymphedema pump and taking diuretic\par Candidate for RFA\par

## 2021-01-04 NOTE — PHYSICAL EXAM
[Please See PDF for [employer]] : Please See PDF for [employer]. [de-identified] : seated at table at home, good hygiene [de-identified] : non labored [de-identified] : limited ambulation [de-identified] : awake, alert , conversant

## 2021-01-19 ENCOUNTER — NON-APPOINTMENT (OUTPATIENT)
Age: 76
End: 2021-01-19

## 2021-01-20 ENCOUNTER — APPOINTMENT (OUTPATIENT)
Dept: WOUND CARE | Facility: CLINIC | Age: 76
End: 2021-01-20
Payer: MEDICARE

## 2021-01-20 VITALS — RESPIRATION RATE: 16 BRPM | TEMPERATURE: 95.5 F

## 2021-01-20 VITALS — SYSTOLIC BLOOD PRESSURE: 113 MMHG | HEART RATE: 79 BPM | DIASTOLIC BLOOD PRESSURE: 73 MMHG

## 2021-01-20 DIAGNOSIS — Z01.818 ENCOUNTER FOR OTHER PREPROCEDURAL EXAMINATION: ICD-10-CM

## 2021-01-20 PROCEDURE — 29581 APPL MULTLAYER CMPRN SYS LEG: CPT | Mod: LT

## 2021-01-20 NOTE — PHYSICAL EXAM
[Please See PDF for [employer]] : Please See PDF for [employer]. [de-identified] : seated at table at home, good hygiene [de-identified] : non labored [de-identified] : limited ambulation [de-identified] : awake, alert , conversant

## 2021-01-20 NOTE — HISTORY OF PRESENT ILLNESS
[FreeTextEntry1] : 74 yo female with a left leg  wound s/p evacuation of hematoma returns to the office for follow up care.  \par Wound demonstrates increased granulation tissue.\par The patient presents with a wound to the left leg-.  Swelling noted to the extremity.  \par GALA/PVR: wnl \par \par No clinical sign of infection\par Recommendation:\par \par Apply lidocaine or topical anesthetic.\par Wash wound with ----0.9% saline/ Dakins 0.125%.or VASHE\par Apply ---- xtrasorb\par Apply ----VAC\par Change dressing ---/ 3 times per week.\par Leg elevation as tolerated\par Encouraged ambulation or exercise.\par Optimization of nutrition.\par Offloading to the wound site.\par \par -----Homecare orders in place\par \par Follow up appointment scheduled for 1 week\par Patient scheduled for vein procedure next week.\par On Eliquis- h/o hematoma\par Methotrexate stopped, but remains on Prednisone for RA\par h/o 3 pregnancies and family h/o varicose veins.

## 2021-01-20 NOTE — ASSESSMENT
[FreeTextEntry1] : 75 yr old discussed prednisone inhibitory effects on wound healing, s/pn methotrexate for RA\par larger, concern for nec fascitis, will send for cbc/ labs , film,possible hbo candidate\par renew levaquin add flagyl for anaerobes, possible PG\par will need vac, > 20% slough\par s/p evacuation of hematoma/abscess\par \par f/u visit Monday\par s/p excisional debridement\par Cultures taken\par Continue daily antiseptic dressing\par Renew levoquin\par   Dr. Ochoa/[phone]\par recommended avoidance of hospitalization at this time during COVID with patient comorbidies\par \par 11/9/20\par Left leg debrided after topical anesthetic and injecteable lidocaine w/epinephrine, patient to follow up this Thursday for debridement.  Results of culture discussed.  Patient to follow up with ID.  Venous insufficiency noted.\par 11/23/20\par Left leg started with wound vac wed. 11/18/20, wound bed is so much , pink, areas of yellow slough along perimeter, debrided today, tolerating vac, small linear area proximal to wound of intact blisters\par Discussed RFA procedure, submitting paperwork\par Left heel unstageable debrided today\par 11/30/20\par Left leg, has had issues with vac supplies, straightened out now, left heel treated with santyl, debrided today\par 12/7/20\par left leg debrided today, continues to improve, 80 % pink granulation tissue, has copious drainage, goes through 1 cannister a day, left lateral heel drying up, betadine applied\par Both legs edematous, compliant with diuretic\par \par 12/14/2020\par s/p excisional debridement. today\par Epioma applied with foam and multilayer dressing\par No clinical sign of infection\par She read the consent and signed it prior to the initiation of any screening procedures.  She had the opportunity to discuss any questions regarding the study.  I witnessed the signing of the consent and the patient was given a copy of the signed consent.\par 12/21/20\par Pt. with large amount of drainage, will be pulled out of epiona study due to that, received lymphedema pump, has not started using yet, wound with moist slough over wound, especially concentrated to edges, debrided today, slough on edges, left heel with dry skin, debrided left heel, removed callous ring around wound, and dry eschar, exposed tissue is clean and pink\par 12/28/20\par Left leg less swollen has been using lymphedema pump, drainage is large, has not been taking diuretic, 3 layer compression garment\par 1/4/21\par left leg clean and pink, debrided of biofilm and slough, continues to improve, drainage is large, using lymphedema pump and taking diuretic\par Candidate for RFA\par 1/20/21\par LLE wound demonstrates increased granulation tissue.  No clinical sign of infection\par No significant improvement in healing.  Patient scheduled for RFA.  Multilayer with Xtrasorb applied today.

## 2021-01-21 ENCOUNTER — APPOINTMENT (OUTPATIENT)
Dept: DISASTER EMERGENCY | Facility: CLINIC | Age: 76
End: 2021-01-21

## 2021-01-22 LAB — SARS-COV-2 N GENE NPH QL NAA+PROBE: NOT DETECTED

## 2021-01-26 ENCOUNTER — APPOINTMENT (OUTPATIENT)
Dept: WOUND CARE | Facility: CLINIC | Age: 76
End: 2021-01-26

## 2021-01-26 ENCOUNTER — APPOINTMENT (OUTPATIENT)
Dept: VASCULAR SURGERY | Facility: CLINIC | Age: 76
End: 2021-01-26
Payer: MEDICARE

## 2021-01-26 PROCEDURE — 36475 ENDOVENOUS RF 1ST VEIN: CPT | Mod: RT

## 2021-01-26 PROCEDURE — 36471 NJX SCLRSNT MLT INCMPTNT VN: CPT | Mod: RT

## 2021-01-29 ENCOUNTER — NON-APPOINTMENT (OUTPATIENT)
Age: 76
End: 2021-01-29

## 2021-01-29 NOTE — PROCEDURE
[FreeTextEntry1] : left GSV RFA with UGFS distal GSV and trib veins [FreeTextEntry3] : Procedural safety checklist and time out completed:\par Confirmed patient identification (Patient Name, , and/or medical record number including when possible affirmation by patient or parent/family/other).\par Confirmed procedure with the patient. Consent present, accurate and signed. \par Confirmed special equipment and supplies are present.\par Sterility confirmed. Position verified. \par Site/ side is marked and visible and confirmed. \par Procedure confirmed by consent. Accurate consent including side and site.\par Review of medical records, including venous ultrasound, noting correct procedure including site and side.\par MD/PA verifies presence and review of imaging studies and or written report of imaging studies.\par Agreement on the procedure to be performed\par Time out completed.\par All of the above has been confirmed by the team.\par All patient-specific concerns have been addressed. \par \par Indication: Left lower extremity varicose veins with ulcer, inflammation, leg pain, leg swelling, and leg cramping.  Venous insufficiency/ reflux.\par \par Procedure: radiofrequency ablation of the left great saphenous vein. \par 	\par Ms. FLORINDA VALVERDE is a 75 year old F with a history of left lower extremity varicose veins previously seen in the office.  Ultrasound examination demonstrated venous insufficiency. A trial of compression stockings, exercise, elevation, and pain medication was attempted without relief and definitive treatment with radiofrequency ablation was offered. \par \par The patient has come for radiofrequency ablation treatment of the left great saphenous vein. Pre-procedure COVID PCR test was negative.\par I have discussed the risks of the procedure at length with the patient. The risks discussed were inclusive of but not limited to infection, irritation at the site of infiltration of local anesthesia, and also rare risk of deep venous thrombosis and pulmonary emboli. The patient agrees to proceed with the procedure. \par The patient was escorted into the procedure room and a time out called.\par The entire limb was prepped and draped in sterile fashion. The RF fiber was placed on the sterile field and connected by a sterile cable. Actuation, temperature, and impedance testing were performed to ensure that all components were connected and operating properly. \par The patient was placed on the procedure table and local anesthesia was instilled in the skin overlying the access site. Under ultrasound guidance, the vein was punctured with a micropuncture needle, using the anterior wall technique. A guide wire was now introduced through the needle, and the needle was then exchanged over the guide wire for a 7F sheath. The guide wire was removed and the RF probe was then placed into the left  saphenous vein through the sheath and position confirmed using ultrasound guidance. After the RF probe position was verified by ultrasound, tumescent anesthesia consisting of normal saline, 1% lidocaine with 8.4% sodium bicarbonate was infiltrated, under ultrasound guidance, precisely into the perivenous compartment along the entire length of the vein until a “halo” of fluid was noted around the vein. After RF probe position was again confirmed with ultrasound imaging, RF energy was applied. The probe was gradually and carefully withdrawn at a rate of 6.5cm/20seconds. \par \par 5 cycles of RF performed using the 7 cm probe\par Total treatment time was 100 seconds.\par The total volume injected was 375 cc\par Treatment length was 26.5 cm and \par The probe is 3.8 cm from the SFJ.\par \par ower extremity ultrasound guided foam sclerotherapy\par \par 1.0 ml of 1.0% Sodium Tetra-decyl sulphate was mixed with 4 ml air. The vein was cannulated with a 27G butterfly needle. The foam solution injected and appropriate visualization of the foam going into the vein was achieved using direct ultrasound guidance. This was repeated at 2 different sites until the entire  4 ml of the foam solution was injected. Every injected area was immediately compressed with gauze. \par Repeat ultrasound of the treated vein was performed confirming successful treatment. After assuring hemostasis, a sterile 4x4 was placed on the access site and an ACE compression wrap was applied. \par Estimated Blood Loss: minimal\par Patient was given post-procedure instructions and follow up appointment with ultrasound was scheduled.\par Medication: STS 1% lot [id number], \par \par Estimated Blood Loss: minimal\par Repeat ultrasound of the treated vein was performed confirming successful treatment. The catheter and sheath were withdrawn and hemostasis established with direct pressure. After assuring hemostasis, a sterile 4x4 was placed on the access site and an ACE compression wrap was applied. Patient tolerated procedure well. Patient was given post-procedure instructions and follow up appointment was scheduled.\par

## 2021-02-01 ENCOUNTER — APPOINTMENT (OUTPATIENT)
Dept: WOUND CARE | Facility: CLINIC | Age: 76
End: 2021-02-01

## 2021-02-01 ENCOUNTER — APPOINTMENT (OUTPATIENT)
Dept: VASCULAR SURGERY | Facility: CLINIC | Age: 76
End: 2021-02-01

## 2021-02-01 ENCOUNTER — NON-APPOINTMENT (OUTPATIENT)
Age: 76
End: 2021-02-01

## 2021-02-03 ENCOUNTER — RX RENEWAL (OUTPATIENT)
Age: 76
End: 2021-02-03

## 2021-02-03 ENCOUNTER — APPOINTMENT (OUTPATIENT)
Dept: WOUND CARE | Facility: CLINIC | Age: 76
End: 2021-02-03

## 2021-02-04 ENCOUNTER — APPOINTMENT (OUTPATIENT)
Dept: WOUND CARE | Facility: CLINIC | Age: 76
End: 2021-02-04
Payer: MEDICARE

## 2021-02-04 ENCOUNTER — APPOINTMENT (OUTPATIENT)
Dept: VASCULAR SURGERY | Facility: CLINIC | Age: 76
End: 2021-02-04
Payer: MEDICARE

## 2021-02-04 VITALS
RESPIRATION RATE: 16 BRPM | SYSTOLIC BLOOD PRESSURE: 132 MMHG | TEMPERATURE: 97 F | HEART RATE: 79 BPM | DIASTOLIC BLOOD PRESSURE: 80 MMHG

## 2021-02-04 DIAGNOSIS — L97.429 NON-PRESSURE CHRONIC ULCER OF LEFT HEEL AND MIDFOOT WITH UNSPECIFIED SEVERITY: ICD-10-CM

## 2021-02-04 DIAGNOSIS — Z81.8 FAMILY HISTORY OF OTHER MENTAL AND BEHAVIORAL DISORDERS: ICD-10-CM

## 2021-02-04 PROCEDURE — 93971 EXTREMITY STUDY: CPT | Mod: LT

## 2021-02-04 PROCEDURE — 99214 OFFICE O/P EST MOD 30 MIN: CPT | Mod: 25

## 2021-02-04 PROCEDURE — 10060 I&D ABSCESS SIMPLE/SINGLE: CPT

## 2021-02-04 RX ORDER — COLLAGENASE SANTYL 250 [ARB'U]/G
250 OINTMENT TOPICAL DAILY
Qty: 1 | Refills: 2 | Status: DISCONTINUED | COMMUNITY
Start: 2020-11-04 | End: 2021-02-04

## 2021-02-04 RX ORDER — FLUOCINONIDE 0.5 MG/G
0.05 CREAM TOPICAL
Qty: 1 | Refills: 0 | Status: DISCONTINUED | COMMUNITY
Start: 2019-11-05 | End: 2021-02-04

## 2021-02-05 NOTE — ASSESSMENT
[FreeTextEntry1] : 75 yr old woman with left leg venous ulcer and new left heel abscess, afib, HTN lymphedema\par   follow up after ablation done 1/26/21. for lt leg\par blood blister present lt heel pt c/o pain in heal over this last week since last time of wrapping .\par    removal of multlayer compression dressing  .  drainage old blood moderate \par \par  data complex - mod lab,xr reviewed\par risk- surgery mod on ac, tolerated drainage well, improved pain\par culture sent\par time35\par .

## 2021-02-05 NOTE — PHYSICAL EXAM
[Normal Rate and Rhythm] : normal rate and rhythm [de-identified] : seated at table at home, good hygiene [de-identified] : camila [de-identified] : non labored [de-identified] : limited ambulation [de-identified] : awake, alert , conversant [Please See PDF for [employer]] : Please See PDF for [employer]. [FreeTextEntry1] : left heel [de-identified] : indication left heel ulcer/abscess\par analgesia topical lido\par procedure incision and drainage\par findings- 5 cc purulent hematoma\par post op tolerated well\par dressing xeroform/betadine\par other wrap\par

## 2021-02-05 NOTE — HISTORY OF PRESENT ILLNESS
[FreeTextEntry1] : 76 yo female with a left leg  wound  s/p excisional debridement today s/p ablation on eliquis\par new pain on heel from wrap\par The patient presents with a wound to the left leg-.  Swelling noted to the extremity.  \par GALA/PVR: wnl \par \par On Eliquis- h/o hematoma\par Methotrexate stopped, but remains on Prednisone for RA\par h/o 3 pregnancies and family h/o varicose veins.\par No clinical sign of infection\par Recommendation:\par \par Apply lidocaine or topical anesthetic. Wash wound with ----0.9% saline/ Dakins 0.125%.or VASHE\par Apply ---- santyl/ \par  \par Change dressing ---/ 3 times per week.\par Leg elevation as tolerated\par Encouraged ambulation or exercise.\par Optimization of nutrition.\par Offloading to the wound site.\par \par -----Homecare orders in place\par \par Follow up appointment scheduled for 1 week\par

## 2021-02-05 NOTE — PROCEDURE
[FreeTextEntry1] : left GSV RFA with UGFS distal GSV and trib veins [FreeTextEntry3] : Procedural safety checklist and time out completed:\par Confirmed patient identification (Patient Name, , and/or medical record number including when possible affirmation by patient or parent/family/other).\par Confirmed procedure with the patient. Consent present, accurate and signed. \par Confirmed special equipment and supplies are present.\par Sterility confirmed. Position verified. \par Site/ side is marked and visible and confirmed. \par Procedure confirmed by consent. Accurate consent including side and site.\par Review of medical records, including venous ultrasound, noting correct procedure including site and side.\par MD/PA verifies presence and review of imaging studies and or written report of imaging studies.\par Agreement on the procedure to be performed\par Time out completed.\par All of the above has been confirmed by the team.\par All patient-specific concerns have been addressed. \par \par Indication: Left lower extremity varicose veins with ulcer, inflammation, leg pain, leg swelling, and leg cramping.  Venous insufficiency/ reflux.\par \par Procedure: radiofrequency ablation of the left great saphenous vein. \par 	\par Ms. FLORINDA VALVERDE is a 75 year old F with a history of left lower extremity varicose veins previously seen in the office.  Ultrasound examination demonstrated venous insufficiency. A trial of compression stockings, exercise, elevation, and pain medication was attempted without relief and definitive treatment with radiofrequency ablation was offered. \par \par The patient has come for radiofrequency ablation treatment of the left great saphenous vein. Pre-procedure COVID PCR test was negative.\par I have discussed the risks of the procedure at length with the patient. The risks discussed were inclusive of but not limited to infection, irritation at the site of infiltration of local anesthesia, and also rare risk of deep venous thrombosis and pulmonary emboli. The patient agrees to proceed with the procedure. \par The patient was escorted into the procedure room and a time out called.\par The entire limb was prepped and draped in sterile fashion. The RF fiber was placed on the sterile field and connected by a sterile cable. Actuation, temperature, and impedance testing were performed to ensure that all components were connected and operating properly. \par The patient was placed on the procedure table and local anesthesia was instilled in the skin overlying the access site. Under ultrasound guidance, the vein was punctured with a micropuncture needle, using the anterior wall technique. A guide wire was now introduced through the needle, and the needle was then exchanged over the guide wire for a 7F sheath. The guide wire was removed and the RF probe was then placed into the left  saphenous vein through the sheath and position confirmed using ultrasound guidance. After the RF probe position was verified by ultrasound, tumescent anesthesia consisting of normal saline, 1% lidocaine with 8.4% sodium bicarbonate was infiltrated, under ultrasound guidance, precisely into the perivenous compartment along the entire length of the vein until a “halo” of fluid was noted around the vein. After RF probe position was again confirmed with ultrasound imaging, RF energy was applied. The probe was gradually and carefully withdrawn at a rate of 6.5cm/20seconds. \par \par 5 cycles of RF performed using the 7 cm probe\par Total treatment time was 100 seconds.\par The total volume injected was 375 cc\par Treatment length was 26.5 cm and \par The probe is 3.8 cm from the SFJ.\par \par ower extremity ultrasound guided foam sclerotherapy\par \par 1.0 ml of 1.0% Sodium Tetra-decyl sulphate was mixed with 4 ml air. The vein was cannulated with a 27G butterfly needle. The foam solution injected and appropriate visualization of the foam going into the vein was achieved using direct ultrasound guidance. This was repeated at 2 different sites until the entire  4 ml of the foam solution was injected. Every injected area was immediately compressed with gauze. \par Repeat ultrasound of the treated vein was performed confirming successful treatment. After assuring hemostasis, a sterile 4x4 was placed on the access site and an ACE compression wrap was applied. \par Estimated Blood Loss: minimal\par Patient was given post-procedure instructions and follow up appointment with ultrasound was scheduled.\par Medication: STS 1% lot [id number], \par \par Estimated Blood Loss: minimal\par Repeat ultrasound of the treated vein was performed confirming successful treatment. The catheter and sheath were withdrawn and hemostasis established with direct pressure. After assuring hemostasis, a sterile 4x4 was placed on the access site and an ACE compression wrap was applied. Patient tolerated procedure well. Patient was given post-procedure instructions and follow up appointment was scheduled.\par

## 2021-02-08 ENCOUNTER — NON-APPOINTMENT (OUTPATIENT)
Age: 76
End: 2021-02-08

## 2021-02-08 LAB — BACTERIA WND CULT: ABNORMAL

## 2021-02-11 ENCOUNTER — APPOINTMENT (OUTPATIENT)
Dept: WOUND CARE | Facility: CLINIC | Age: 76
End: 2021-02-11
Payer: MEDICARE

## 2021-02-11 VITALS — WEIGHT: 200 LBS | BODY MASS INDEX: 35.44 KG/M2 | HEIGHT: 63 IN | TEMPERATURE: 95.8 F

## 2021-02-11 VITALS — SYSTOLIC BLOOD PRESSURE: 122 MMHG | HEART RATE: 73 BPM | RESPIRATION RATE: 16 BRPM | DIASTOLIC BLOOD PRESSURE: 77 MMHG

## 2021-02-11 PROCEDURE — 99213 OFFICE O/P EST LOW 20 MIN: CPT | Mod: 24

## 2021-02-12 NOTE — PROCEDURE
[FreeTextEntry1] : left GSV RFA with UGFS distal GSV and trib veins [FreeTextEntry3] : Procedural safety checklist and time out completed:\par Confirmed patient identification (Patient Name, , and/or medical record number including when possible affirmation by patient or parent/family/other).\par Confirmed procedure with the patient. Consent present, accurate and signed. \par Confirmed special equipment and supplies are present.\par Sterility confirmed. Position verified. \par Site/ side is marked and visible and confirmed. \par Procedure confirmed by consent. Accurate consent including side and site.\par Review of medical records, including venous ultrasound, noting correct procedure including site and side.\par MD/PA verifies presence and review of imaging studies and or written report of imaging studies.\par Agreement on the procedure to be performed\par Time out completed.\par All of the above has been confirmed by the team.\par All patient-specific concerns have been addressed. \par \par Indication: Left lower extremity varicose veins with ulcer, inflammation, leg pain, leg swelling, and leg cramping.  Venous insufficiency/ reflux.\par \par Procedure: radiofrequency ablation of the left great saphenous vein. \par 	\par Ms. FLORINDA VALVERDE is a 75 year old F with a history of left lower extremity varicose veins previously seen in the office.  Ultrasound examination demonstrated venous insufficiency. A trial of compression stockings, exercise, elevation, and pain medication was attempted without relief and definitive treatment with radiofrequency ablation was offered. \par \par The patient has come for radiofrequency ablation treatment of the left great saphenous vein. Pre-procedure COVID PCR test was negative.\par I have discussed the risks of the procedure at length with the patient. The risks discussed were inclusive of but not limited to infection, irritation at the site of infiltration of local anesthesia, and also rare risk of deep venous thrombosis and pulmonary emboli. The patient agrees to proceed with the procedure. \par The patient was escorted into the procedure room and a time out called.\par The entire limb was prepped and draped in sterile fashion. The RF fiber was placed on the sterile field and connected by a sterile cable. Actuation, temperature, and impedance testing were performed to ensure that all components were connected and operating properly. \par The patient was placed on the procedure table and local anesthesia was instilled in the skin overlying the access site. Under ultrasound guidance, the vein was punctured with a micropuncture needle, using the anterior wall technique. A guide wire was now introduced through the needle, and the needle was then exchanged over the guide wire for a 7F sheath. The guide wire was removed and the RF probe was then placed into the left  saphenous vein through the sheath and position confirmed using ultrasound guidance. After the RF probe position was verified by ultrasound, tumescent anesthesia consisting of normal saline, 1% lidocaine with 8.4% sodium bicarbonate was infiltrated, under ultrasound guidance, precisely into the perivenous compartment along the entire length of the vein until a “halo” of fluid was noted around the vein. After RF probe position was again confirmed with ultrasound imaging, RF energy was applied. The probe was gradually and carefully withdrawn at a rate of 6.5cm/20seconds. \par \par 5 cycles of RF performed using the 7 cm probe\par Total treatment time was 100 seconds.\par The total volume injected was 375 cc\par Treatment length was 26.5 cm and \par The probe is 3.8 cm from the SFJ.\par \par ower extremity ultrasound guided foam sclerotherapy\par \par 1.0 ml of 1.0% Sodium Tetra-decyl sulphate was mixed with 4 ml air. The vein was cannulated with a 27G butterfly needle. The foam solution injected and appropriate visualization of the foam going into the vein was achieved using direct ultrasound guidance. This was repeated at 2 different sites until the entire  4 ml of the foam solution was injected. Every injected area was immediately compressed with gauze. \par Repeat ultrasound of the treated vein was performed confirming successful treatment. After assuring hemostasis, a sterile 4x4 was placed on the access site and an ACE compression wrap was applied. \par Estimated Blood Loss: minimal\par Patient was given post-procedure instructions and follow up appointment with ultrasound was scheduled.\par Medication: STS 1% lot [id number], \par \par Estimated Blood Loss: minimal\par Repeat ultrasound of the treated vein was performed confirming successful treatment. The catheter and sheath were withdrawn and hemostasis established with direct pressure. After assuring hemostasis, a sterile 4x4 was placed on the access site and an ACE compression wrap was applied. Patient tolerated procedure well. Patient was given post-procedure instructions and follow up appointment was scheduled.\par

## 2021-02-12 NOTE — ASSESSMENT
[FreeTextEntry1] : 75 yr old woman with left leg venous ulcer and new left heel abscess, afib, HTN lymphedema\par   follow up after ablation done 1/26/21. for lt leg\par blood blister present lt heel pt c/o pain in heal over this last week since last time of wrapping .\par   removal of multlayer compression dressing  .  drainage old blood moderate \par \par  data complex - mod lab,xr reviewed\par risk- surgery mod on ac, tolerated drainage well, improved pain\par culture sent\par time35\par \par 2/11/21\par left calf wound debrided today, swelling has decreased, had RFA , successful ablation and sclerotherapy, left heel  continues to improve, pinpoint opening, periwound skin dryness\par .

## 2021-02-12 NOTE — PHYSICAL EXAM
[Normal Rate and Rhythm] : normal rate and rhythm [Please See PDF for [employer]] : Please See PDF for [employer]. [de-identified] : seated at table at home, good hygiene [de-identified] : camila [de-identified] : non labored [de-identified] : limited ambulation [de-identified] : awake, alert , conversant [de-identified] : indication left heel ulcer/abscess\par analgesia topical lido\par procedure incision and drainage\par findings- 5 cc purulent hematoma\par post op tolerated well\par dressing xeroform/betadine\par other wrap\par  [FreeTextEntry1] : left heel

## 2021-02-12 NOTE — HISTORY OF PRESENT ILLNESS
[FreeTextEntry1] : 76 yo female with a left leg  wound  s/p excisional debridement today s/p ablation on eliquis\par new pain on heel from wrap , no fevers\par The patient presents with a wound to the left leg-.  Swelling noted to the extremity.  \par GALA/PVR: wnl \par On Eliquis- h/o hematoma\par Methotrexate stopped, but remains on Prednisone for RA\par h/o 3 pregnancies and family h/o varicose veins.\par No clinical sign of infection\par Recommendation:\par Apply lidocaine or topical anesthetic. Wash wound with ----0.9% saline/ Dakins 0.125%.or VASHE\par Apply ---- santyl/ \par  \par Change dressing ---/ 3 times per week.\par Leg elevation as tolerated\par Encouraged ambulation or exercise.\par Optimization of nutrition.\par Offloading to the wound site.\par \par -----Homecare orders in place\par \par Follow up appointment scheduled for 1 week\par

## 2021-02-12 NOTE — PLAN
[FreeTextEntry1] : 2/11/21 \par Plan - aquacel /xtrasorb over wound, moisturize periwound, compression wrap,c/w lymphedema pump, taking abx. aquacel to heel\par skin sub. ordered\par Follow up 2 weeks

## 2021-02-25 ENCOUNTER — APPOINTMENT (OUTPATIENT)
Dept: WOUND CARE | Facility: CLINIC | Age: 76
End: 2021-02-25
Payer: MEDICARE

## 2021-02-25 VITALS
RESPIRATION RATE: 16 BRPM | HEART RATE: 85 BPM | SYSTOLIC BLOOD PRESSURE: 146 MMHG | TEMPERATURE: 96.5 F | DIASTOLIC BLOOD PRESSURE: 75 MMHG

## 2021-02-25 DIAGNOSIS — Z78.9 OTHER SPECIFIED HEALTH STATUS: ICD-10-CM

## 2021-02-25 PROCEDURE — 15271 SKIN SUB GRAFT TRNK/ARM/LEG: CPT

## 2021-02-25 PROCEDURE — 99214 OFFICE O/P EST MOD 30 MIN: CPT | Mod: 25

## 2021-02-25 RX ORDER — SULFAMETHOXAZOLE AND TRIMETHOPRIM 800; 160 MG/1; MG/1
800-160 TABLET ORAL TWICE DAILY
Qty: 20 | Refills: 1 | Status: DISCONTINUED | COMMUNITY
Start: 2021-02-08 | End: 2021-02-25

## 2021-02-25 RX ORDER — TRAMADOL HYDROCHLORIDE 50 MG/1
50 TABLET, COATED ORAL 3 TIMES DAILY
Qty: 20 | Refills: 0 | Status: DISCONTINUED | COMMUNITY
Start: 2020-10-16 | End: 2021-02-25

## 2021-02-26 NOTE — PLAN
[FreeTextEntry1] :  \par Plan -primatrix today/ veil foam/ moisturize periwound, compression wrap,c/w lymphedema pump, taking abx.\par skin sub. applied\par Follow up 1 weeks

## 2021-02-26 NOTE — HISTORY OF PRESENT ILLNESS
[FreeTextEntry1] : 76 yo female with a left leg  wound  s/p excisional debridement today s/p ablation on eliquis\par new pain on heel from wrap , no fevers\par no fever\par The patient presents with a wound to the left leg-.  Swelling noted to the extremity.  \par GALA/PVR: wnl \par On Eliquis- h/o hematoma\par Methotrexate stopped, but remains on Prednisone for RA\par h/o 3 pregnancies and family h/o varicose veins.\par No clinical sign of infection\par Recommendation:\par Apply lidocaine or topical anesthetic. Wash wound with ----0.9% saline/ Dakins 0.125%.or VASHE\par Apply ---- santyl/ \par  \par Change dressing ---/ 3 times per week.\par Leg elevation as tolerated\par Encouraged ambulation or exercise.\par Optimization of nutrition.\par Offloading to the wound site.\par \par -----Homecare orders in place\par \par Follow up appointment scheduled for 1 week\par

## 2021-02-26 NOTE — END OF VISIT
[facility] CARDIOLOGY PROGRESS NOTE 
      
 
7/16/2018 8:44 AM 
 
Admit Date: 7/10/2018 Subjective:  
Breathing and edema much improved. On bumex IV for last two and half days. ROS: 
Cardiovascular:  As noted above Objective:  
  
Vitals:  
 07/15/18 7466 07/16/18 6617 07/16/18 4208 07/16/18 4510 BP: 99/66 104/67  114/78 Pulse: 75 63  89 Resp: 20 18 18 Temp: 98 °F (36.7 °C) 98.1 °F (36.7 °C)  97.6 °F (36.4 °C) SpO2: 97% 97%  100% Weight:   101.6 kg (224 lb) Height:      
 
 
Physical Exam: 
General-No Acute Distress Neck- supple, no JVD 
CV- regular rate and rhythm no MRG Lung- clear bilaterally Abd- soft, nontender, nondistended Ext- no edema bilaterally. Skin- warm and dry Data Review:  
Recent Labs  
   07/16/18 
 0511  07/15/18 
 0450 NA  142  142  
K  4.5  4.4 MG  2.4  2.4 BUN  24*  23  
CREA  1.53*  1.45 GLU  84  78 WBC  7.5  7.5 HGB  13.9  14.2 HCT  42.9  43.4 PLT  147*  148* Assessment/Plan:  
 
Principal Problem: 
  Acute respiratory failure with hypoxemia ([facility] [address] (5/24/2018) Improved, attempt to wean oxygen Active Problems: Hypertension (9/29/2017) Stable on current meds. COPD, moderate ([address] (9/29/2017) Per primary team 
 
  High triglycerides (9/29/2017) Essential hypertension (9/28/2016) Stable on current meds. CHF (congestive heart failure) ([address] (9/27/2017) Echo results reviewed, moderate RV dysfunction and phtn--diuresed well in last two days. Will stop IV bumex and switch to PO. Add back aldactone and stop supplemental KCL with addition of aldactone TAZ (obstructive sleep apnea) (10/2/2017) Jagdish Gonzalez NP 
7/16/2018 8:44 AM 
 
 [Time Spent: ___ minutes] : I have spent [unfilled] minutes of time on the encounter.

## 2021-02-26 NOTE — ASSESSMENT
[FreeTextEntry1] : 75 yr old woman with left leg venous ulcer and new left heel abscess, afib, HTN lymphedema\par   follow up after ablation done 1/26/21. for lt leg\par blood blister present lt heel pt c/o pain in heal over this last week since last time of wrapping .\par   removal of multlayer compression dressing  .  drainage old blood moderate \par \par  data complex - mod lab,xr reviewed\par risk- surgery mod on ac, tolerated drainage well, improved pain\par \par Primatrix 1 applied today to patient's left lateral leg. wound bed debrided of bioburded and prepped for product application. 2 pieces of 36 sq cm obtained. Total product usage was 45.3 sq. cm, Total waste 26.7 sq. cm due to wound size. Product secured with steri strips and bolster dressing.\par Patient to f/u in 1 week. \par

## 2021-02-26 NOTE — PHYSICAL EXAM
[Normal Rate and Rhythm] : normal rate and rhythm [Please See PDF for [employer]] : Please See PDF for [employer]. [de-identified] : seated at table at home, good hygiene [de-identified] : caimla [de-identified] : non labored [de-identified] : limited ambulation [de-identified] : awake, alert , conversant [FreeTextEntry1] : left heel [de-identified] : indication left heel ulcer/abscess\par analgesia topical lido\par procedure incision and drainage\par findings- 5 cc purulent hematoma\par post op tolerated well\par dressing xeroform/betadine\par other wrap\par

## 2021-03-05 ENCOUNTER — APPOINTMENT (OUTPATIENT)
Dept: WOUND CARE | Facility: CLINIC | Age: 76
End: 2021-03-05
Payer: MEDICARE

## 2021-03-05 VITALS
RESPIRATION RATE: 16 BRPM | DIASTOLIC BLOOD PRESSURE: 69 MMHG | HEART RATE: 89 BPM | TEMPERATURE: 96.4 F | SYSTOLIC BLOOD PRESSURE: 109 MMHG

## 2021-03-05 PROCEDURE — 99213 OFFICE O/P EST LOW 20 MIN: CPT

## 2021-03-09 NOTE — PLAN
[FreeTextEntry1] :  Plan -primatrix/ adaptic /foam/ moisturize periwound, compression wrap,c/w lymphedema pump.\par \par Follow up 2 weeks

## 2021-03-09 NOTE — HISTORY OF PRESENT ILLNESS
[FreeTextEntry1] : Ms. FLORINDA VALVERDE   presents to the office with a wound since October 2020. The wound is located on  the left leg. The patient has complaints of mild pain.  The patient has been dressing the wound with xtrasorb and dynaflex.  The patient denies fevers or chills. The patient has localized pain to the wound upon dressing changes. The patient has no other complaints or associated symptoms. HbA1c is 5.7 as of 12/10/2020.\par \par Patient uses lymphedema pump daily\par \par \par \par

## 2021-03-09 NOTE — ASSESSMENT
[FreeTextEntry1] : 75 yr old woman with left leg venous ulcer , afib, HTN lymphedema\par   follow up after ablation done 1/26/21. for lt leg\par \par  data complex - mod lab,xr reviewed\par risk- surgery mod on ac, tolerated drainage well, improved pain\par \par Primatrix applied last visit. Wound veil intact \par Plan for (). (or) Plan for re application of ().primatrix\par \par Wound has shown improvement through (increased granulation and or decreased size).\par \par Wound is free from infection drainage and necrotic tissue. \par \par Patient has adequate blood supply as demonstrated by palpable pulses and GALA  \par \par Patient is on comprehensive diabetic management program with a Hg A1c \par \par Wound has been treated with standards of care for () weeks including (compression, offloading, debridement).

## 2021-03-09 NOTE — PHYSICAL EXAM
[Normal Rate and Rhythm] : normal rate and rhythm [Please See PDF for [employer]] : Please See PDF for [employer]. [de-identified] : seated at table at home, good hygiene [de-identified] : camila [de-identified] : non labored [de-identified] : limited ambulation [de-identified] : awake, alert , conversant [FreeTextEntry1] : left heel

## 2021-03-16 ENCOUNTER — RX RENEWAL (OUTPATIENT)
Age: 76
End: 2021-03-16

## 2021-03-19 ENCOUNTER — APPOINTMENT (OUTPATIENT)
Dept: WOUND CARE | Facility: CLINIC | Age: 76
End: 2021-03-19
Payer: MEDICARE

## 2021-03-19 VITALS
RESPIRATION RATE: 16 BRPM | DIASTOLIC BLOOD PRESSURE: 83 MMHG | TEMPERATURE: 96.3 F | HEART RATE: 83 BPM | SYSTOLIC BLOOD PRESSURE: 137 MMHG

## 2021-03-19 DIAGNOSIS — L03.116 CELLULITIS OF LEFT LOWER LIMB: ICD-10-CM

## 2021-03-19 DIAGNOSIS — M79.89 OTHER SPECIFIED SOFT TISSUE DISORDERS: ICD-10-CM

## 2021-03-19 PROCEDURE — 99213 OFFICE O/P EST LOW 20 MIN: CPT | Mod: 25

## 2021-03-19 PROCEDURE — 0598T R-T FLUOR WOUND IMG 1ST SITE: CPT

## 2021-03-19 PROCEDURE — 15271 SKIN SUB GRAFT TRNK/ARM/LEG: CPT

## 2021-03-19 PROCEDURE — 0599T R-T FLUOR WOUND IMG EA ADDL: CPT

## 2021-03-24 PROBLEM — L03.116 CELLULITIS OF FOOT, LEFT: Status: ACTIVE | Noted: 2018-07-18

## 2021-03-24 NOTE — ASSESSMENT
[FreeTextEntry1] : 75 yr old woman with left leg venous ulcer , afib, HTN lymphedema\par   follow up after ablation done 1/26/21. for lt leg\par  data complex - mod lab,xr reviewed\par risk- surgery mod on ac, tolerated drainage well, improved pain\par \par Primatrix applied 2/25/2021. Wound veil intact \par  ) Plan for re application of ().primatrix\par Wound has shown improvement through (increased granulation and or decreased size).\par Wound is free from infection drainage and necrotic tissue. \par Patient has adequate blood supply as demonstrated by palpable pulses and GALA  \par Patient is on comprehensive diabetic management program with a Hg A1c \par Wound has been treated with standards of care for () weeks including (compression, offloading, debridement).\par skin substitute number primatrix) applied today to patient's (anatomical site left leg ulcer). \par wound bed debrided of bioburded and prepped for product application. (1# of pieces and 6x6 original size of product) obtained. Total product usage was (36 sq. cm), Total waste (0x sq. cm) due to wound size.\par  Product secured with (steri strips and bolster dressing).\par Patient to f/u in 1 week.

## 2021-03-24 NOTE — PHYSICAL EXAM
[Normal Rate and Rhythm] : normal rate and rhythm [Please See PDF for [employer]] : Please See PDF for [employer]. [de-identified] : seated at table at home, good hygiene [de-identified] : camila [de-identified] : non labored [de-identified] : limited ambulation [de-identified] : awake, alert , conversant [FreeTextEntry1] : left heel

## 2021-03-30 ENCOUNTER — RX RENEWAL (OUTPATIENT)
Age: 76
End: 2021-03-30

## 2021-03-30 ENCOUNTER — APPOINTMENT (OUTPATIENT)
Dept: WOUND CARE | Facility: CLINIC | Age: 76
End: 2021-03-30
Payer: MEDICARE

## 2021-03-30 VITALS — RESPIRATION RATE: 16 BRPM | DIASTOLIC BLOOD PRESSURE: 83 MMHG | HEART RATE: 73 BPM | SYSTOLIC BLOOD PRESSURE: 148 MMHG

## 2021-03-30 VITALS — BODY MASS INDEX: 35.44 KG/M2 | WEIGHT: 200 LBS | HEIGHT: 63 IN | TEMPERATURE: 97.3 F

## 2021-03-30 DIAGNOSIS — L03.115 CELLULITIS OF RIGHT LOWER LIMB: ICD-10-CM

## 2021-03-30 PROCEDURE — 11042 DBRDMT SUBQ TIS 1ST 20SQCM/<: CPT

## 2021-03-30 PROCEDURE — 99213 OFFICE O/P EST LOW 20 MIN: CPT | Mod: 25

## 2021-03-31 PROBLEM — L03.115 CELLULITIS OF RIGHT LOWER EXTREMITY: Status: ACTIVE | Noted: 2019-04-22

## 2021-03-31 NOTE — DATA REVIEWED
[FreeTextEntry1] : 3/19skin substitute number primatrix) applied today to patient's (anatomical site left leg ulcer). \par wound bed debrided of bioburded and prepped for product application. (1# of pieces and 6x6 original size of product) obtained. Total product usage was (36 sq. cm), Total waste (0x sq. cm) due to wound size.\par  Product secured with (steri strips and bolster dressing).\par also placed on 2/25

## 2021-03-31 NOTE — PLAN
[FreeTextEntry1] :  \par Plan - alginate, compression wrap,c/w lymphedema pump, taking abx.\par  dakins cleanse/ extrasorb\par Follow up 1 weeks

## 2021-03-31 NOTE — PHYSICAL EXAM
[Normal Rate and Rhythm] : normal rate and rhythm [Please See PDF for [employer]] : Please See PDF for [employer]. [de-identified] : seated at table at home, good hygiene [de-identified] : camila [de-identified] : non labored [de-identified] : limited ambulation [de-identified] : awake, alert , conversant [FreeTextEntry1] : left heel

## 2021-03-31 NOTE — ASSESSMENT
[FreeTextEntry1] : 75 yr old woman with left leg venous ulcer , afib, HTN lymphedema\par   follow up after ablation done 1/26/21. for lt leg\par  data complex - mod lab,xr reviewed\par risk- surgery mod on ac, tolerated debridment well, improved pain\par \par   Plan for re application of ().primatrix\par Wound has shown improvement through (increased granulation and or decreased size).\par Wound is free from infection drainage and necrotic tissue. \par Patient has adequate blood supply as demonstrated by palpable pulses and GALA  \par Patient is on comprehensive diabetic management program with a Hg A1c \par Wound has been treated with standards of care for () weeks including (compression, offloading, debridement).\par Patient to f/u in 1 week.\par 3/31/21\par veil removed

## 2021-03-31 NOTE — HISTORY OF PRESENT ILLNESS
[FreeTextEntry1] : 74 yo female with a left leg  wound  s/p excisional debridement today s/p ablation on eliquis\par new pain on heel from wrap , no fevers, had remnant primatrix last week\par no fever\par The patient presents with a wound to the left leg-.  Swelling noted to the extremity.  \par GALA/PVR: wnl \par On Eliquis- h/o hematoma\par Methotrexate stopped, but remains on Prednisone for RA\par h/o 3 pregnancies and family h/o varicose veins.\par No clinical sign of infection\par Recommendation:\par Apply lidocaine or topical anesthetic. Wash wound with ----0.9% saline/ Dakins 0.125%.or VASHE\par Apply ---- santyl/ \par  \par Change dressing ---/ 3 times per week.\par Leg elevation as tolerated\par Encouraged ambulation or exercise.\par Optimization of nutrition.\par Offloading to the wound site.\par \par -----Homecare orders in place\par \par Follow up appointment scheduled for 1 week\par

## 2021-04-12 ENCOUNTER — NON-APPOINTMENT (OUTPATIENT)
Age: 76
End: 2021-04-12

## 2021-04-13 ENCOUNTER — APPOINTMENT (OUTPATIENT)
Dept: WOUND CARE | Facility: CLINIC | Age: 76
End: 2021-04-13
Payer: MEDICARE

## 2021-04-13 DIAGNOSIS — Z79.899 OTHER LONG TERM (CURRENT) DRUG THERAPY: ICD-10-CM

## 2021-04-13 PROCEDURE — 11042 DBRDMT SUBQ TIS 1ST 20SQCM/<: CPT

## 2021-04-13 PROCEDURE — 11045 DBRDMT SUBQ TISS EACH ADDL: CPT

## 2021-04-13 PROCEDURE — 99214 OFFICE O/P EST MOD 30 MIN: CPT | Mod: 25

## 2021-04-18 PROBLEM — Z79.899 LONG TERM METHOTREXATE USER: Status: RESOLVED | Noted: 2018-09-11 | Resolved: 2021-04-18

## 2021-04-18 NOTE — HISTORY OF PRESENT ILLNESS
[de-identified] : no sin sub ordered/approved  for today, will put endoform on [FreeTextEntry1] : 76 yo female with a left leg  wound  s/p excisional debridement today s/p ablation on eliquis\par new pain on heel from wrap , no fevers, had remnant primatrix last week\par no fever\par The patient presents with a wound to the left leg-.  Swelling noted to the extremity.  \par GALA/PVR: wnl \par On Eliquis- h/o hematoma\par Methotrexate stopped, but remains on Prednisone for RA\par h/o 3 pregnancies and family h/o varicose veins.\par No clinical sign of infection\par Recommendation:\par Apply lidocaine or topical anesthetic. Wash wound with ----0.9% saline/ Dakins 0.125%.or VASHE\par Apply ---- santyl/ \par  \par Change dressing ---/ 3 times per week.\par Leg elevation as tolerated\par Encouraged ambulation or exercise.\par Optimization of nutrition.\par Offloading to the wound site.\par \par -----Homecare orders in place\par \par Follow up appointment scheduled for 1 week\par

## 2021-04-18 NOTE — PLAN
[FreeTextEntry1] :  \par Plan - alginate, compression wrap,c/w lymphedema pump, taking prednisone/ac\par saline cleanse/ extrasorb\par Follow up 1 weeks

## 2021-04-18 NOTE — PHYSICAL EXAM
[Normal Rate and Rhythm] : normal rate and rhythm [Please See PDF for [employer]] : Please See PDF for [employer]. [de-identified] : camila [de-identified] : seated at table at home, good hygiene [de-identified] : non labored [de-identified] : limited ambulation [de-identified] : awake, alert , conversant [FreeTextEntry1] : left heel

## 2021-04-18 NOTE — ASSESSMENT
[FreeTextEntry1] : 75 yr old woman with left leg venous ulcer , afib, HTN lymphedema\par   follow up after ablation done 1/26/21. for lt leg\par  data complex - mod lab,xr reviewed\par risk- surgery mod on ac, tolerated debridment well, improved pain\par \par 4/13/21- endoform placed today after debridement\par \par \par awaiting  Plan for re application of ().primatrix\par Wound has shown improvement through (increased granulation and or decreased size).\par Wound is free from infection drainage and necrotic tissue. \par Patient has adequate blood supply as demonstrated by palpable pulses and GALA  \par Patient is on comprehensive diabetic management program with a Hg A1c \par Wound has been treated with standards of care for (many) weeks including (compression, offloading, debridement).\par Patient to f/u in 1 week.\par

## 2021-04-26 ENCOUNTER — NON-APPOINTMENT (OUTPATIENT)
Age: 76
End: 2021-04-26

## 2021-04-27 ENCOUNTER — APPOINTMENT (OUTPATIENT)
Dept: WOUND CARE | Facility: CLINIC | Age: 76
End: 2021-04-27
Payer: MEDICARE

## 2021-04-27 VITALS — WEIGHT: 200 LBS | HEIGHT: 63 IN | BODY MASS INDEX: 35.44 KG/M2 | TEMPERATURE: 96.4 F

## 2021-04-27 PROCEDURE — 99213 OFFICE O/P EST LOW 20 MIN: CPT | Mod: 25

## 2021-04-27 PROCEDURE — 15272 SKIN SUB GRAFT T/A/L ADD-ON: CPT

## 2021-04-27 PROCEDURE — 15271 SKIN SUB GRAFT TRNK/ARM/LEG: CPT

## 2021-04-27 NOTE — ASSESSMENT
[FreeTextEntry1] : 75 yr old woman with left leg venous ulcer , afib, HTN lymphedema\par   follow up after ablation done 1/26/21. for lt leg\par  data complex - mod lab,xr reviewed\par risk- surgery mod on ac, tolerated debridment well, improved pain\par \par 4/13/21- endoform placed today after debridement\par \par \par awaiting  Plan for re application of ().primatrix\par Wound has shown improvement through (increased granulation and or decreased size).\par Wound is free from infection drainage and necrotic tissue. \par Patient has adequate blood supply as demonstrated by palpable pulses and GALA  \par Patient is on comprehensive diabetic management program with a Hg A1c \par Wound has been treated with standards of care for (many) weeks including (compression, offloading, debridement).\par Patient to f/u in 1 week.\par 4/27/21\par The patient was positioned to allow for optimization of imaging. The fluorescence imaging device was placed 8-12 cm from the patient and the clinical darkness required for imaging was obtained by a dark drape. The wound measured   ----- cm2 on the ---/left ---leg). The [employer] i:X fluorescence imaging device was used to report presence and location of red or cyan fluorescence, indicative of bacteria at loads greater than 104 CFU/g in real-time. Images reported to have ---no/red----fluorescence. The wound was ----mechanically cleansed with Dakins 0.125%/Iodine/0.9% saline) and/or underwent excisional debridement.  Fluorescence images acquired after cleansing demonstrated reduction in bacteria.\par primatrix number 2 applied today to patient’s left leg.   Wound bed debrided of bioburden and prepped for product application.  1 of pieces and original size of product) obtained.  Total product usage was (x49.84 sq. cm), Total waste 0 sq. cm) due to wound size.  Product secured with (steri strips and bolster dressing).\par Patient to f/u in 1 week.\par \par \par \par

## 2021-04-27 NOTE — HISTORY OF PRESENT ILLNESS
[FreeTextEntry1] : 74 yo female with a left leg  wound  s/p excisional debridement today s/p ablation on eliquis\par new pain on heel from wrap , no fevers, had remnant primatrix last week\par no fever\par The patient presents with a wound to the left leg-.  Swelling noted to the extremity.  \par GALA/PVR: wnl \par On Eliquis- h/o hematoma\par Methotrexate stopped, but remains on Prednisone for RA\par h/o 3 pregnancies and family h/o varicose veins.\par No clinical sign of infection\par Recommendation:\par Apply lidocaine or topical anesthetic. Wash wound with ----0.9% saline/ Dakins 0.125%.or VASHE\par Apply ---- santyl/ \par  \par Change dressing ---/ 3 times per week.\par Leg elevation as tolerated\par Encouraged ambulation or exercise.\par Optimization of nutrition.\par Offloading to the wound site.\par \par -----Homecare orders in place\par \par Follow up appointment scheduled for 1 week\par  [de-identified] : no sin sub ordered/approved  for today, will put endoform on

## 2021-04-27 NOTE — PHYSICAL EXAM
[Normal Rate and Rhythm] : normal rate and rhythm [Please See PDF for [employer]] : Please See PDF for [employer]. [de-identified] : seated at table at home, good hygiene [de-identified] : camila [de-identified] : non labored [de-identified] : limited ambulation [de-identified] : awake, alert , conversant [FreeTextEntry1] : left heel

## 2021-04-27 NOTE — PLAN
[FreeTextEntry1] :  4/27/21\par Plan - skin sub. applied, wound veil over, xtrasorb, unna, orders for nurse\par c/w lymphedema pump\par follow up 1 week

## 2021-05-03 ENCOUNTER — APPOINTMENT (OUTPATIENT)
Dept: WOUND CARE | Facility: CLINIC | Age: 76
End: 2021-05-03
Payer: MEDICARE

## 2021-05-03 PROCEDURE — 99214 OFFICE O/P EST MOD 30 MIN: CPT | Mod: 95

## 2021-05-03 NOTE — ASSESSMENT
[FreeTextEntry1] : 75 yr old woman with left leg venous ulcer , afib, HTN lymphedema\par   follow up after ablation done 1/26/21. for lt leg\par  data complex - mod lab,xr reviewed\par risk- surgery mod on ac, tolerated debridment well, improved pain\par \par 4/13/21- endoform placed today after debridement\par \par \par awaiting  Plan for re application of ().primatrix\par Wound has shown improvement through (increased granulation and or decreased size).\par Wound is free from infection drainage and necrotic tissue. \par Patient has adequate blood supply as demonstrated by palpable pulses and GALA  \par Patient is on comprehensive diabetic management program with a Hg A1c \par Wound has been treated with standards of care for (many) weeks including (compression, offloading, debridement).\par Patient to f/u in 1 week.\par 4/27/21\par The patient was positioned to allow for optimization of imaging. The fluorescence imaging device was placed 8-12 cm from the patient and the clinical darkness required for imaging was obtained by a dark drape. The wound measured   ----- cm2 on the ---/left ---leg). The [employer] i:X fluorescence imaging device was used to report presence and location of red or cyan fluorescence, indicative of bacteria at loads greater than 104 CFU/g in real-time. Images reported to have ---no/red----fluorescence. The wound was ----mechanically cleansed with Dakins 0.125%/Iodine/0.9% saline) and/or underwent excisional debridement.  Fluorescence images acquired after cleansing demonstrated reduction in bacteria.\par primatrix number 2 applied today to patient’s left leg.   Wound bed debrided of bioburden and prepped for product application.  1 of pieces and original size of product) obtained.  Total product usage was (x49.84 sq. cm), Total waste 0 sq. cm) due to wound size.  Product secured with (steri strips and bolster dressing).\par Patient to f/u in 1 week.\par \par 5/3/21\par left calf had primatrix applied last week, today found that product was totally absorbed, using lymphedema pump\par \par \par

## 2021-05-03 NOTE — PLAN
[FreeTextEntry1] :  5/3/21\par Plan - c/w pump, clean with dakins, xtrasorb, unna\par plan to order isabel, s/w nurse, xtrasorb, unna\par follow up 1-2 weeks

## 2021-05-03 NOTE — PHYSICAL EXAM
[Normal Rate and Rhythm] : normal rate and rhythm [Please See PDF for [employer]] : Please See PDF for [employer]. [de-identified] : seated at table at home, good hygiene [de-identified] : camila [de-identified] : non labored [de-identified] : limited ambulation [de-identified] : awake, alert , conversant [FreeTextEntry1] : left heel

## 2021-05-03 NOTE — HISTORY OF PRESENT ILLNESS
[Home] : at home, [unfilled] , at the time of the visit. [Medical Office: ([facility])___] : at the medical office located in  [Spouse] : spouse [Other:____] : [unfilled] [Verbal consent obtained from patient] : the patient, [unfilled] [FreeTextEntry4] : Loi NP [FreeTextEntry1] : 76 yo female with a left leg  wound  s/p excisional debridement today s/p ablation on eliquis\par new pain on heel from wrap , no fevers, had remnant primatrix last week\par no fever\par The patient presents with a wound to the left leg-.  Swelling noted to the extremity.  \par GALA/PVR: wnl \par On Eliquis- h/o hematoma\par Methotrexate stopped, but remains on Prednisone for RA\par h/o 3 pregnancies and family h/o varicose veins.\par No clinical sign of infection\par Recommendation:\par Apply lidocaine or topical anesthetic. Wash wound with ----0.9% saline/ Dakins 0.125%.or VASHE\par Apply ---- santyl/ \par  \par Change dressing ---/ 3 times per week.\par Leg elevation as tolerated\par Encouraged ambulation or exercise.\par Optimization of nutrition.\par Offloading to the wound site.\par \par -----Homecare orders in place\par \par Follow up appointment scheduled for 1 week\par  [de-identified] : no sin sub ordered/approved  for today, will put endoform on

## 2021-05-21 ENCOUNTER — APPOINTMENT (OUTPATIENT)
Dept: DISASTER EMERGENCY | Facility: OTHER | Age: 76
End: 2021-05-21
Payer: MEDICARE

## 2021-05-21 PROCEDURE — 0001A: CPT

## 2021-06-11 ENCOUNTER — APPOINTMENT (OUTPATIENT)
Dept: DISASTER EMERGENCY | Facility: OTHER | Age: 76
End: 2021-06-11
Payer: MEDICARE

## 2021-06-11 PROCEDURE — 0002A: CPT

## 2021-06-18 ENCOUNTER — NON-APPOINTMENT (OUTPATIENT)
Age: 76
End: 2021-06-18

## 2021-06-21 ENCOUNTER — APPOINTMENT (OUTPATIENT)
Dept: WOUND CARE | Facility: CLINIC | Age: 76
End: 2021-06-21
Payer: MEDICARE

## 2021-06-21 VITALS
BODY MASS INDEX: 35.44 KG/M2 | TEMPERATURE: 96.8 F | WEIGHT: 200 LBS | DIASTOLIC BLOOD PRESSURE: 81 MMHG | RESPIRATION RATE: 16 BRPM | SYSTOLIC BLOOD PRESSURE: 142 MMHG | HEART RATE: 80 BPM | HEIGHT: 63 IN

## 2021-06-21 PROCEDURE — 15271 SKIN SUB GRAFT TRNK/ARM/LEG: CPT

## 2021-06-21 PROCEDURE — 15272 SKIN SUB GRAFT T/A/L ADD-ON: CPT

## 2021-06-21 NOTE — PLAN
[FreeTextEntry1] : 6/21/2021\par Plan - skin sub. applied, wound veil over, xtrasorb, unna, orders for nurse\par c/w lymphedema pump\par orders for nurse given\par follow up 1 week

## 2021-06-21 NOTE — HISTORY OF PRESENT ILLNESS
[FreeTextEntry1] : 74 yo female with a left leg  wound  s/p excisional debridement today s/p ablation on eliquis\par new pain on heel from wrap , no fevers, had remnant primatrix last week\par no fever\par The patient presents with a wound to the left leg-.  Swelling noted to the extremity.  \par GALA/PVR: wnl \par On Eliquis- h/o hematoma\par Methotrexate stopped, but remains on Prednisone for RA\par h/o 3 pregnancies and family h/o varicose veins.\par No clinical sign of infection\par Recommendation:\par Apply lidocaine or topical anesthetic. Wash wound with ----0.9% saline/ Dakins 0.125%.or VASHE\par Apply ---- santyl/ \par  \par Change dressing ---/ 3 times per week.\par Leg elevation as tolerated\par Encouraged ambulation or exercise.\par Optimization of nutrition.\par Offloading to the wound site.\par \par -----Homecare orders in place\par \par Follow up appointment scheduled for 1 week\par  [de-identified] : no sin sub ordered/approved  for today, will put endoform on

## 2021-06-21 NOTE — PHYSICAL EXAM
[Normal Rate and Rhythm] : normal rate and rhythm [Please See PDF for [employer]] : Please See PDF for [employer]. [de-identified] : camila [de-identified] : seated at table at home, good hygiene [de-identified] : non labored [de-identified] : limited ambulation [de-identified] : awake, alert , conversant [FreeTextEntry1] : left heel

## 2021-06-21 NOTE — ASSESSMENT
[FreeTextEntry1] : 75 yr old woman with left leg venous ulcer , afib, HTN lymphedema\par   follow up after ablation done 1/26/21. for lt leg\par  data complex - mod lab,xr reviewed\par risk- surgery mod on ac, tolerated debridment well, improved pain\par \par 6/21/2021\par Primatrix number 4 applied today to patient’s left lateral leg.   Wound bed debrided of bioburden and prepped for product application.  1 piece of 36 sq cm obtained.  Total product usage was 25.18 sq. cm, Total waste 10.82 sq. cm due to wound size. Product secured with steri strips and bolster dressing.\par Patient to f/u in 1 week.\par \par \par \par \par

## 2021-06-25 ENCOUNTER — NON-APPOINTMENT (OUTPATIENT)
Age: 76
End: 2021-06-25

## 2021-06-28 ENCOUNTER — APPOINTMENT (OUTPATIENT)
Dept: WOUND CARE | Facility: CLINIC | Age: 76
End: 2021-06-28
Payer: MEDICARE

## 2021-06-28 PROCEDURE — 99213 OFFICE O/P EST LOW 20 MIN: CPT | Mod: 95

## 2021-06-28 NOTE — HISTORY OF PRESENT ILLNESS
[Home] : at home, [unfilled] , at the time of the visit. [Medical Office: ([facility])___] : at the medical office located in  [Other:____] : [unfilled] [Verbal consent obtained from patient] : the patient, [unfilled] [FreeTextEntry4] : MARCY Limon NP [FreeTextEntry1] : 76 yo female with a left leg  wound  s/p excisional debridement today s/p ablation on eliquis\par new pain on heel from wrap , no fevers, had remnant primatrix last week\par no fever\par The patient presents with a wound to the left leg-.  Swelling noted to the extremity.  \par GALA/PVR: wnl \par On Eliquis- h/o hematoma\par Methotrexate stopped, but remains on Prednisone for RA\par h/o 3 pregnancies and family h/o varicose veins.\par No clinical sign of infection\par Recommendation:\par Apply lidocaine or topical anesthetic. Wash wound with ----0.9% saline/ Dakins 0.125%.or VASHE\par Apply ---- santyl/ \par  \par Change dressing ---/ 3 times per week.\par Leg elevation as tolerated\par Encouraged ambulation or exercise.\par Optimization of nutrition.\par Offloading to the wound site.\par \par -----Homecare orders in place\par \par Follow up appointment scheduled for 1 week\par  [de-identified] : no sin sub ordered/approved  for today, will put endoform on

## 2021-06-28 NOTE — PLAN
[FreeTextEntry1] : 6/28/21\par Plan - c/w pump\par wound veil removed, wound washed and to resume isabel, d/w nurse, compression over\par will order skin sub.\par  follow up 2 weeks

## 2021-06-28 NOTE — ASSESSMENT
[FreeTextEntry1] : 75 yr old woman with left leg venous ulcer , afib, HTN lymphedema\par   follow up after ablation done 1/26/21. for lt leg\par  data complex - mod lab,xr reviewed\par risk- surgery mod on ac, tolerated debridment well, improved pain\par \par 6/21/2021\par Primatrix number 4 applied today to patient’s left lateral leg.   Wound bed debrided of bioburden and prepped for product application.  1 piece of 36 sq cm obtained.  Total product usage was 25.18 sq. cm, Total waste 10.82 sq. cm due to wound size. Product secured with steri strips and bolster dressing.\par Patient to f/u in 1 week.\par \par 6/28/21\par Skin sub. that was applied last week has been absorbed, wound bed pink and clean\par \par \par \par \par

## 2021-06-28 NOTE — PHYSICAL EXAM
[Normal Rate and Rhythm] : normal rate and rhythm [Please See PDF for [employer]] : Please See PDF for [employer]. [de-identified] : seated at table at home, good hygiene [de-identified] : camila [de-identified] : non labored [de-identified] : limited ambulation [de-identified] : awake, alert , conversant [FreeTextEntry1] : left heel

## 2021-07-01 ENCOUNTER — NON-APPOINTMENT (OUTPATIENT)
Age: 76
End: 2021-07-01

## 2021-07-14 ENCOUNTER — APPOINTMENT (OUTPATIENT)
Dept: WOUND CARE | Facility: CLINIC | Age: 76
End: 2021-07-14
Payer: MEDICARE

## 2021-07-14 VITALS
DIASTOLIC BLOOD PRESSURE: 85 MMHG | TEMPERATURE: 96.4 F | SYSTOLIC BLOOD PRESSURE: 135 MMHG | RESPIRATION RATE: 16 BRPM | HEART RATE: 85 BPM

## 2021-07-14 PROCEDURE — 11042 DBRDMT SUBQ TIS 1ST 20SQCM/<: CPT

## 2021-07-15 NOTE — ASSESSMENT
[FreeTextEntry1] : 75 yr old woman with left leg venous ulcer , afib, HTN lymphedema\par   follow up after ablation done 1/26/21. for lt leg\par  data complex - mod lab,xr reviewed\par risk- surgery mod on ac, tolerated debridment well, improved pain\par \par 6/21/2021\par Primatrix number 4 applied today to patient’s left lateral leg.   Wound bed debrided of bioburden and prepped for product application.  1 piece of 36 sq cm obtained.  Total product usage was 25.18 sq. cm, Total waste 10.82 sq. cm due to wound size. Product secured with steri strips and bolster dressing.\par Patient to f/u in 1 week.\par \par 6/28/21\par Skin sub. that was applied last week has been absorbed, wound bed pink and clean\par \par 7/14/21\par Plan for application.  Wound has shown improvement through (increased granulation and/or decreased size).\par \par Wound is free from infection, drainage and necrotic tissue.  (Patient has adequate blood supply as demonstrated by palpable pulses and an GALA of 1.1 from 11/2020\par \par Wound has been treated with standards of care for 8 months including (compression, offloading, debridement’s).\par wound debrided today, endoform applied, island of skin towards lateral aspect of wound.\par \par \par \par \par \par \par \par \par

## 2021-07-15 NOTE — PLAN
[FreeTextEntry1] : 7/14/21\par Plan  endoform applied, wound veil over, xtrasorb over, unna boot\par \par orders for nurse\par follow up 2 weeks

## 2021-07-15 NOTE — HISTORY OF PRESENT ILLNESS
[Home] : at home, [unfilled] , at the time of the visit. [Medical Office: ([facility])___] : at the medical office located in  [Other:____] : [unfilled] [Verbal consent obtained from patient] : the patient, [unfilled] [de-identified] : no sin sub ordered/approved  for today, will put endoform on [FreeTextEntry4] : MARCY Limon NP [FreeTextEntry1] : 74 yo female with a left leg  wound  s/p excisional debridement today s/p ablation on eliquis\par new pain on heel from wrap , no fevers, had remnant primatrix last week\par no fever\par The patient presents with a wound to the left leg-.  Swelling noted to the extremity.  \par GALA/PVR: wnl \par On Eliquis- h/o hematoma\par Methotrexate stopped, but remains on Prednisone for RA\par h/o 3 pregnancies and family h/o varicose veins.\par No clinical sign of infection\par Recommendation:\par Apply lidocaine or topical anesthetic. Wash wound with ----0.9% saline/ Dakins 0.125%.or VASHE\par Apply ---- santyl/ \par  \par Change dressing ---/ 3 times per week.\par Leg elevation as tolerated\par Encouraged ambulation or exercise.\par Optimization of nutrition.\par Offloading to the wound site.\par \par -----Homecare orders in place\par \par Follow up appointment scheduled for 1 week\par

## 2021-07-15 NOTE — PHYSICAL EXAM
[Normal Rate and Rhythm] : normal rate and rhythm [Please See PDF for [employer]] : Please See PDF for [employer]. [de-identified] : seated at table at home, good hygiene [de-identified] : camila [de-identified] : non labored [de-identified] : limited ambulation [de-identified] : awake, alert , conversant [FreeTextEntry1] : left heel

## 2021-07-28 ENCOUNTER — APPOINTMENT (OUTPATIENT)
Dept: WOUND CARE | Facility: CLINIC | Age: 76
End: 2021-07-28
Payer: MEDICARE

## 2021-07-28 PROCEDURE — 99212 OFFICE O/P EST SF 10 MIN: CPT | Mod: 25

## 2021-07-28 PROCEDURE — 15271 SKIN SUB GRAFT TRNK/ARM/LEG: CPT | Mod: 59

## 2021-07-28 NOTE — PLAN
[FreeTextEntry1] : 7/28/2021\par Consent obtained from patient and  regarding use of PuraPly .\par Puraply applied to wound bed; covered with xtrasorb and multilayer compression \par patient to return to office in 1 week.\par \par Plan for skin substitute, Apligraf\par  instructed to call prior to visit to confirm that product will be available\par Type:\par Wound has shown improvement through Increased granulation tissue and decrease in wound size\par Wound is free from infection, drainage and necrotic tissue\par Patient has adequate blood supply as demonstrated by bleeding\par GALA results/date (for venous wound):\par Compression type (for venous wounds):\par Patient is on a comprehensive diabetic management program:\par HgbA1c results/date (for DFU):\par Offloading measures (for DFU):\par Wound has been treated with standards of care for  weeks\par \par \par orders for nurse\par follow up 2 weeks

## 2021-07-28 NOTE — ASSESSMENT
[FreeTextEntry1] : 75 yr old woman with left leg venous ulcer , afib, HTN lymphedema\par   follow up after ablation done 1/26/21. for lt leg\par  data complex - mod lab,xr reviewed\par risk- surgery mod on ac, tolerated debridment well, improved pain\par \par 6/21/2021\par Primatrix number 4 applied today to patient’s left lateral leg.   Wound bed debrided of bioburden and prepped for product application.  1 piece of 36 sq cm obtained.  Total product usage was 25.18 sq. cm, Total waste 10.82 sq. cm due to wound size. Product secured with steri strips and bolster dressing.\par Patient to f/u in 1 week.\par \par 6/28/21\par Skin sub. that was applied last week has been absorbed, wound bed pink and clean\par \par 7/14/21\par Plan for application.  Wound has shown improvement through (increased granulation and/or decreased size).\par \par Wound is free from infection, drainage and necrotic tissue.  (Patient has adequate blood supply as demonstrated by palpable pulses and an GALA of 1.1 from 11/2020\par \par Wound has been treated with standards of care for 8 months including (compression, offloading, debridement’s).\par wound debrided today, endoform applied, island of skin towards lateral aspect of wound.\par \par 7/28/2021\par \par Skin substitute applied.\par Type: Puraply\par Application number:\par Site: Left Lateral Leg\par Wound bed debrided of bioburden and prepped for product application:\par Total units received: 45\par Total units applied:45\par Total units wasted due to wound size:0\par Product secured with steri-strips and bolster dressing: \par Patient to f/u in 1 week\par \par \par \par \par \par \par \par \par

## 2021-07-28 NOTE — PHYSICAL EXAM
[Normal Rate and Rhythm] : normal rate and rhythm [Please See PDF for [employer]] : Please See PDF for [employer]. [de-identified] : Anxious, overweight,  present [de-identified] : camila [de-identified] : non labored [de-identified] : limited ambulation [de-identified] : awake, alert , conversant [FreeTextEntry1] : left heel

## 2021-07-28 NOTE — HISTORY OF PRESENT ILLNESS
[Other:____] : [unfilled] [FreeTextEntry1] : 74 yo female with a left leg  wound  s/p excisional debridement today s/p ablation on eliquis\par new pain on heel from wrap , no fevers, had remnant primatrix last week\par no fever\par The patient presents with a wound to the left leg-.  Swelling noted to the extremity.  \par GALA/PVR: wnl \par On Eliquis- h/o hematoma\par Methotrexate stopped, but remains on Prednisone for RA\par h/o 3 pregnancies and family h/o varicose veins.\par No clinical sign of infection\par Recommendation:\par Apply lidocaine or topical anesthetic. Wash wound with ----0.9% saline/ Dakins 0.125%.or VASHE\par Apply ---- santyl/ \par  \par Change dressing ---/ 3 times per week.\par Leg elevation as tolerated\par Encouraged ambulation or exercise.\par Optimization of nutrition.\par Offloading to the wound site.\par \par -----Homecare orders in place\par \par Follow up appointment scheduled for 1 week\par 7/28/21  Questions regarding appointment were all addressed by multiple staff members\par  [de-identified] : no sin sub ordered/approved  for today, will put endoform on

## 2021-07-29 DIAGNOSIS — E87.6 HYPOKALEMIA: ICD-10-CM

## 2021-07-29 LAB
ALBUMIN SERPL ELPH-MCNC: 3.9 G/DL
ALP BLD-CCNC: 71 U/L
ALT SERPL-CCNC: 7 U/L
ANION GAP SERPL CALC-SCNC: 18 MMOL/L
AST SERPL-CCNC: 11 U/L
BASOPHILS # BLD AUTO: 0.03 K/UL
BASOPHILS NFR BLD AUTO: 0.5 %
BILIRUB SERPL-MCNC: 0.4 MG/DL
BUN SERPL-MCNC: 23 MG/DL
CALCIUM SERPL-MCNC: 9.6 MG/DL
CHLORIDE SERPL-SCNC: 100 MMOL/L
CO2 SERPL-SCNC: 24 MMOL/L
CREAT SERPL-MCNC: 0.6 MG/DL
EOSINOPHIL # BLD AUTO: 0.22 K/UL
EOSINOPHIL NFR BLD AUTO: 3.5 %
ESTIMATED AVERAGE GLUCOSE: 123 MG/DL
FERRITIN SERPL-MCNC: 120 NG/ML
FOLATE SERPL-MCNC: 11.1 NG/ML
GLUCOSE SERPL-MCNC: 74 MG/DL
HBA1C MFR BLD HPLC: 5.9 %
HCT VFR BLD CALC: 37.8 %
HGB BLD-MCNC: 11.7 G/DL
IMM GRANULOCYTES NFR BLD AUTO: 1.4 %
IRON SATN MFR SERPL: 19 %
IRON SERPL-MCNC: 54 UG/DL
LDH SERPL-CCNC: 205 U/L
LYMPHOCYTES # BLD AUTO: 1.52 K/UL
LYMPHOCYTES NFR BLD AUTO: 24 %
MAN DIFF?: NORMAL
MCHC RBC-ENTMCNC: 29.3 PG
MCHC RBC-ENTMCNC: 31 GM/DL
MCV RBC AUTO: 94.5 FL
MONOCYTES # BLD AUTO: 0.5 K/UL
MONOCYTES NFR BLD AUTO: 7.9 %
NEUTROPHILS # BLD AUTO: 3.98 K/UL
NEUTROPHILS NFR BLD AUTO: 62.7 %
NT-PROBNP SERPL-MCNC: 159 PG/ML
PLATELET # BLD AUTO: 310 K/UL
POTASSIUM SERPL-SCNC: 2.7 MMOL/L
PROT SERPL-MCNC: 7.5 G/DL
RBC # BLD: 4 M/UL
RBC # BLD: 4 M/UL
RBC # FLD: 14.3 %
RETICS # AUTO: 1.7 %
RETICS AGGREG/RBC NFR: 66.4 K/UL
SODIUM SERPL-SCNC: 142 MMOL/L
TIBC SERPL-MCNC: 288 UG/DL
UIBC SERPL-MCNC: 234 UG/DL
VIT B12 SERPL-MCNC: 378 PG/ML
WBC # FLD AUTO: 6.34 K/UL

## 2021-08-04 ENCOUNTER — APPOINTMENT (OUTPATIENT)
Dept: WOUND CARE | Facility: CLINIC | Age: 76
End: 2021-08-04
Payer: MEDICARE

## 2021-08-04 LAB
ALBUMIN MFR SERPL ELPH: 49.9 %
ALBUMIN SERPL-MCNC: 3.7 G/DL
ALBUMIN/GLOB SERPL: 1 RATIO
ALPHA1 GLOB MFR SERPL ELPH: 5.9 %
ALPHA1 GLOB SERPL ELPH-MCNC: 0.4 G/DL
ALPHA2 GLOB MFR SERPL ELPH: 12.6 %
ALPHA2 GLOB SERPL ELPH-MCNC: 0.9 G/DL
B-GLOBULIN MFR SERPL ELPH: 13.9 %
B-GLOBULIN SERPL ELPH-MCNC: 1 G/DL
GAMMA GLOB FLD ELPH-MCNC: 1.3 G/DL
GAMMA GLOB MFR SERPL ELPH: 17.7 %
INTERPRETATION SERPL IEP-IMP: NORMAL
PROT SERPL-MCNC: 7.5 G/DL
PROT SERPL-MCNC: 7.5 G/DL

## 2021-08-04 PROCEDURE — 0598T R-T FLUOR WOUND IMG 1ST SITE: CPT | Mod: RT

## 2021-08-04 PROCEDURE — 15271 SKIN SUB GRAFT TRNK/ARM/LEG: CPT

## 2021-08-04 NOTE — HISTORY OF PRESENT ILLNESS
[FreeTextEntry1] : 76 yo female with a left leg  wound  s/p excisional debridement today s/p ablation on eliquis\par new pain on heel from wrap , no fevers, had remnant primatrix last week\par no fever\par The patient presents with a wound to the left leg-.  Swelling noted to the extremity.  \par GALA/PVR: wnl \par On Eliquis- h/o hematoma\par Methotrexate stopped, but remains on Prednisone for RA\par h/o 3 pregnancies and family h/o varicose veins.\par \par  \par \par  [de-identified] : no sin sub ordered/approved  for today, will put endoform on

## 2021-08-04 NOTE — ASSESSMENT
[FreeTextEntry1] : 75 yr old woman with left leg venous ulcer , afib, HTN lymphedema\par   follow up after ablation done 1/26/21. for lt leg\par  data complex - mod lab,xr reviewed\par risk- surgery mod on ac, tolerated debridment well, improved pain\par \par 6/21/2021\par Primatrix number 4 applied today to patient’s left lateral leg.   Wound bed debrided of bioburden and prepped for product application.  1 piece of 36 sq cm obtained.  Total product usage was 25.18 sq. cm, Total waste 10.82 sq. cm due to wound size. Product secured with steri strips and bolster dressing.\par Patient to f/u in 1 week.\par \par 6/28/21\par Skin sub. that was applied last week has been absorbed, wound bed pink and clean\par \par 7/14/21\par Plan for application.  Wound has shown improvement through (increased granulation and/or decreased size).\par \par Wound is free from infection, drainage and necrotic tissue.  (Patient has adequate blood supply as demonstrated by palpable pulses and an GALA of 1.1 from 11/2020\par \par Wound has been treated with standards of care for 8 months including (compression, offloading, debridement’s).\par wound debrided today, endoform applied, island of skin towards lateral aspect of wound.\par \par 8/4/21\par s/p Apligraf placement\par Change dressing ---/ 3 times per week. with foam\par Leg elevation as tolerated\par Encouraged ambulation or exercise.\par Optimization of nutrition.\par Offloading to the wound site.\par \par -----Homecare orders in place\par \par Follow up appointment scheduled for 1 week\par \par \par \par \par \par \par

## 2021-08-04 NOTE — PHYSICAL EXAM
[Normal Rate and Rhythm] : normal rate and rhythm [de-identified] : seated at table at home, good hygiene [de-identified] : camila [de-identified] : non labored [de-identified] : limited ambulation [de-identified] : awake, alert , conversant [Please See PDF for [employer]] : Please See PDF for [employer]. [FreeTextEntry1] : left heel

## 2021-08-05 ENCOUNTER — LABORATORY RESULT (OUTPATIENT)
Age: 76
End: 2021-08-05

## 2021-08-05 LAB
ANION GAP SERPL CALC-SCNC: 14 MMOL/L
BASOPHILS # BLD AUTO: 0.04 K/UL
BASOPHILS NFR BLD AUTO: 0.4 %
BUN SERPL-MCNC: 27 MG/DL
CALCIUM SERPL-MCNC: 9.6 MG/DL
CHLORIDE SERPL-SCNC: 105 MMOL/L
CO2 SERPL-SCNC: 24 MMOL/L
CREAT SERPL-MCNC: 0.58 MG/DL
EOSINOPHIL # BLD AUTO: 0.18 K/UL
EOSINOPHIL NFR BLD AUTO: 2 %
GLUCOSE SERPL-MCNC: 87 MG/DL
HCT VFR BLD CALC: 37 %
HGB BLD-MCNC: 11.3 G/DL
IMM GRANULOCYTES NFR BLD AUTO: 0.8 %
LYMPHOCYTES # BLD AUTO: 1.03 K/UL
LYMPHOCYTES NFR BLD AUTO: 11.4 %
MAN DIFF?: NORMAL
MCHC RBC-ENTMCNC: 29.4 PG
MCHC RBC-ENTMCNC: 30.5 GM/DL
MCV RBC AUTO: 96.4 FL
MONOCYTES # BLD AUTO: 0.71 K/UL
MONOCYTES NFR BLD AUTO: 7.8 %
NEUTROPHILS # BLD AUTO: 7.04 K/UL
NEUTROPHILS NFR BLD AUTO: 77.6 %
PLATELET # BLD AUTO: 309 K/UL
POTASSIUM SERPL-SCNC: 4 MMOL/L
RBC # BLD: 3.84 M/UL
RBC # FLD: 14.5 %
SODIUM SERPL-SCNC: 143 MMOL/L
WBC # FLD AUTO: 9.07 K/UL

## 2021-08-11 ENCOUNTER — APPOINTMENT (OUTPATIENT)
Dept: WOUND CARE | Facility: CLINIC | Age: 76
End: 2021-08-11
Payer: MEDICARE

## 2021-08-11 VITALS — TEMPERATURE: 95.1 F | BODY MASS INDEX: 35.44 KG/M2 | HEIGHT: 63 IN | WEIGHT: 200 LBS

## 2021-08-11 VITALS — DIASTOLIC BLOOD PRESSURE: 69 MMHG | HEART RATE: 76 BPM | SYSTOLIC BLOOD PRESSURE: 111 MMHG

## 2021-08-11 PROCEDURE — 11042 DBRDMT SUBQ TIS 1ST 20SQCM/<: CPT

## 2021-08-11 NOTE — HISTORY OF PRESENT ILLNESS
[FreeTextEntry1] : 74 yo female with a left leg  wound  s/p excisional debridement today s/p ablation on eliquis\par new pain on heel from wrap , no fevers, had remnant primatrix last week\par no fever\par The patient presents with a wound to the left leg-.  Swelling noted to the extremity.  \par GALA/PVR: wnl \par On Eliquis- h/o hematoma\par Methotrexate stopped, but remains on Prednisone for RA\par h/o 3 pregnancies and family h/o varicose veins.\par \par  \par \par  [de-identified] : no sin sub ordered/approved  for today, will put endoform on

## 2021-08-11 NOTE — ASSESSMENT
[FreeTextEntry1] : 75 yr old woman with left leg venous ulcer , afib, HTN lymphedema\par   follow up after ablation done 1/26/21. for lt leg\par  data complex - mod lab,xr reviewed\par risk- surgery mod on ac, tolerated debridment well, improved pain\par \par 8/11/21\par left leg had apligraf placed last week, all absorbed\par The patient was positioned to allow for optimization of imaging. The fluorescence imaging device was placed 8-12 cm from the patient and the clinical darkness required for imaging was obtained by a dark drape. The wound measured   ----- cm2 on the ---/left ---leg. The [employer] i:X fluorescence imaging device was used to report presence and location of red fluorescence, indicative of bacteria at loads greater than 104 CFU/g in real-time. Images reported to have ---no/red----fluorescence. The wound was ----mechanically cleansed with baby shampoo and/or underwent excisional debridement.  Fluorescence images acquired after cleansing demonstrated reduction in bacteria.\par

## 2021-08-11 NOTE — PLAN
[FreeTextEntry1] : 8/11/21\par Plan - resum isabel, xtrasorb, unna or dynaflex\par c/w lymphedema pump\par follow up 2-3 weeks

## 2021-08-11 NOTE — PHYSICAL EXAM
[Normal Rate and Rhythm] : normal rate and rhythm [Please See PDF for [employer]] : Please See PDF for [employer]. [de-identified] : seated at table at home, good hygiene [de-identified] : camila [de-identified] : non labored [de-identified] : limited ambulation [de-identified] : awake, alert , conversant [FreeTextEntry1] : left heel

## 2021-08-25 ENCOUNTER — APPOINTMENT (OUTPATIENT)
Dept: WOUND CARE | Facility: CLINIC | Age: 76
End: 2021-08-25
Payer: MEDICARE

## 2021-08-25 VITALS
HEIGHT: 63 IN | WEIGHT: 210 LBS | SYSTOLIC BLOOD PRESSURE: 135 MMHG | DIASTOLIC BLOOD PRESSURE: 82 MMHG | HEART RATE: 83 BPM | BODY MASS INDEX: 37.21 KG/M2

## 2021-08-25 DIAGNOSIS — L03.90 CELLULITIS, UNSPECIFIED: ICD-10-CM

## 2021-08-25 DIAGNOSIS — L03.032 CELLULITIS OF LEFT TOE: ICD-10-CM

## 2021-08-25 PROCEDURE — 11042 DBRDMT SUBQ TIS 1ST 20SQCM/<: CPT

## 2021-08-25 PROCEDURE — 11045 DBRDMT SUBQ TISS EACH ADDL: CPT

## 2021-08-25 NOTE — ASSESSMENT
[FreeTextEntry1] : 75 yr old woman with left leg venous ulcer , afib, HTN lymphedema\par   follow up after ablation done 1/26/21. for lt leg\par  data complex - mod lab,xr reviewed\par risk- surgery mod on ac, tolerated debridment well, improved pain\par \par 8/11/21\par left leg had apligraf placed last week, all absorbed\par The patient was positioned to allow for optimization of imaging. The fluorescence imaging device was placed 8-12 cm from the patient and the clinical darkness required for imaging was obtained by a dark drape. The wound measured   ----- cm2 on the ---/left ---leg. The [employer] i:X fluorescence imaging device was used to report presence and location of red fluorescence, indicative of bacteria at loads greater than 104 CFU/g in real-time. Images reported to have ---no/red----fluorescence. The wound was ----mechanically cleansed with baby shampoo and/or underwent excisional debridement.  Fluorescence images acquired after cleansing demonstrated reduction in bacteria.\par \par 8/25/21\par Left leg wound debrided today\par right leg erythemic , warm to touch and edematous, over past few days, denies pain with walking \par

## 2021-08-25 NOTE — PLAN
[FreeTextEntry1] : 8/25/21\par Plan - start Ioplex, cut to size/xtrasorb over/ unna\par orders for nurse given\par keflex ordered\par follow up 2-3 weeks

## 2021-08-25 NOTE — PHYSICAL EXAM
[Normal Rate and Rhythm] : normal rate and rhythm [Please See PDF for [employer]] : Please See PDF for [employer]. [de-identified] : seated at table at home, good hygiene [de-identified] : camila [de-identified] : non labored [de-identified] : limited ambulation [de-identified] : awake, alert , conversant [FreeTextEntry1] : left heel

## 2021-08-25 NOTE — HISTORY OF PRESENT ILLNESS
[FreeTextEntry1] : 74 yo female with a left leg  wound  s/p excisional debridement today s/p ablation on eliquis\par new pain on heel from wrap , no fevers, had remnant primatrix last week\par no fever\par The patient presents with a wound to the left leg-.  Swelling noted to the extremity.  \par GALA/PVR: wnl \par On Eliquis- h/o hematoma\par Methotrexate stopped, but remains on Prednisone for RA\par h/o 3 pregnancies and family h/o varicose veins.\par \par  \par \par  [de-identified] : no sin sub ordered/approved  for today, will put endoform on

## 2021-09-08 ENCOUNTER — APPOINTMENT (OUTPATIENT)
Dept: WOUND CARE | Facility: CLINIC | Age: 76
End: 2021-09-08
Payer: MEDICARE

## 2021-09-08 VITALS — DIASTOLIC BLOOD PRESSURE: 84 MMHG | RESPIRATION RATE: 16 BRPM | HEART RATE: 76 BPM | SYSTOLIC BLOOD PRESSURE: 134 MMHG

## 2021-09-08 VITALS — WEIGHT: 210 LBS | TEMPERATURE: 95.4 F | BODY MASS INDEX: 37.21 KG/M2 | HEIGHT: 63 IN

## 2021-09-08 PROCEDURE — 11042 DBRDMT SUBQ TIS 1ST 20SQCM/<: CPT

## 2021-09-08 RX ORDER — LIDOCAINE HYDROCHLORIDE 10 MG/ML
1 INJECTION, SOLUTION INFILTRATION; PERINEURAL
Qty: 1 | Refills: 0 | Status: DISCONTINUED | COMMUNITY
Start: 2021-01-19 | End: 2021-09-08

## 2021-09-08 RX ORDER — CEPHALEXIN 500 MG/1
500 CAPSULE ORAL 3 TIMES DAILY
Qty: 15 | Refills: 0 | Status: DISCONTINUED | COMMUNITY
Start: 2021-08-25 | End: 2021-09-08

## 2021-09-08 RX ORDER — SODIUM BICARBONATE 84 MG/ML
8.4 INJECTION, SOLUTION INTRAVENOUS
Qty: 1 | Refills: 0 | Status: DISCONTINUED | COMMUNITY
Start: 2021-01-19 | End: 2021-09-08

## 2021-09-08 RX ORDER — SODIUM CHLORIDE 9 G/ML
0.9 INJECTION, SOLUTION INTRAVENOUS
Qty: 1 | Refills: 0 | Status: DISCONTINUED | COMMUNITY
Start: 2021-01-19 | End: 2021-09-08

## 2021-09-08 RX ORDER — ACETAMINOPHEN 650 MG/1
650 TABLET, FILM COATED, EXTENDED RELEASE ORAL
Refills: 0 | Status: ACTIVE | COMMUNITY

## 2021-09-09 NOTE — ASSESSMENT
[FreeTextEntry1] : 75 yr old woman with left leg venous ulcer , afib, HTN lymphedema\par   follow up after ablation done 1/26/21. for lt leg\par  data complex - mod lab,xr reviewed\par risk- surgery mod on ac, tolerated debridment well, improved pain\par \par \par Left leg wound debrided today\par right leg erythemic , warm to touch and edematous, over past few days, denies pain with walking \par

## 2021-09-09 NOTE — PHYSICAL EXAM
[Normal Rate and Rhythm] : normal rate and rhythm [Please See PDF for [employer]] : Please See PDF for [employer]. [de-identified] : seated at table at home, good hygiene [de-identified] : camila [de-identified] : non labored [de-identified] : limited ambulation [de-identified] : awake, alert , conversant [FreeTextEntry1] : left heel

## 2021-09-09 NOTE — PLAN
[FreeTextEntry1] : Plan - c/w Ioplex, nurse to order , cut to size, cover with xtrasorb, and wrap with multilayer compression wrap\par orders for nurse given\par right leg wrapped to reduce swelling\par follow up 2-3 weeks

## 2021-09-09 NOTE — HISTORY OF PRESENT ILLNESS
[FreeTextEntry1] : Ms. FLORINDA VALVERDE   presents to the office with a recurrent wound for several months duration. Left leg ulcer since 10/9/2020.   \par \par Prior, The wound is located on  the Left lower extremity.  The patient has complaints of mild pain and mild swelling. The patient has been dressing the wound with Ioplex foam and unna boots. The patient denies fevers or chills.  The patient has localized pain to the wound upon dressing changes.  The patient has no other complaints or associated symptoms.\par \par Patient has homecare nurse coming 3x's per week for wound care

## 2021-09-22 ENCOUNTER — APPOINTMENT (OUTPATIENT)
Dept: WOUND CARE | Facility: CLINIC | Age: 76
End: 2021-09-22
Payer: MEDICARE

## 2021-09-22 PROCEDURE — 11042 DBRDMT SUBQ TIS 1ST 20SQCM/<: CPT

## 2021-09-27 NOTE — PLAN
[FreeTextEntry1] : 9/22/21\par Plan - c/w ioplex, ( pre pt. nurse is having an issue getting, will look into it), xtrasorb over, dynaflex\par no unna for right side - will purchase new compression garment and start wearing\par script given for compression\par follow up 2 weeks

## 2021-09-27 NOTE — PHYSICAL EXAM
[Normal Rate and Rhythm] : normal rate and rhythm [Please See PDF for [employer]] : Please See PDF for [employer]. [de-identified] : seated at table at home, good hygiene [de-identified] : camila [de-identified] : non labored [de-identified] : limited ambulation [de-identified] : awake, alert , conversant [FreeTextEntry1] : left heel

## 2021-09-27 NOTE — ASSESSMENT
[FreeTextEntry1] : 75 yr old woman with left leg venous ulcer , afib, HTN lymphedema\par   follow up after ablation done 1/26/21. for lt leg\par  data complex - mod lab,xr reviewed\par risk- surgery mod on ac, tolerated debridment well, improved pain\par \par \par 9/22/21\par right leg was edematous last week so unna was applied, no wounds\par needs new compression garment\par left leg has been using ioplex\par s/p excisional debridement\par re-measured for compression garment

## 2021-10-06 ENCOUNTER — APPOINTMENT (OUTPATIENT)
Dept: WOUND CARE | Facility: CLINIC | Age: 76
End: 2021-10-06

## 2021-10-06 ENCOUNTER — APPOINTMENT (OUTPATIENT)
Dept: WOUND CARE | Facility: CLINIC | Age: 76
End: 2021-10-06
Payer: MEDICARE

## 2021-10-06 VITALS
DIASTOLIC BLOOD PRESSURE: 82 MMHG | SYSTOLIC BLOOD PRESSURE: 128 MMHG | TEMPERATURE: 97.3 F | RESPIRATION RATE: 16 BRPM | HEART RATE: 80 BPM

## 2021-10-06 PROCEDURE — 11042 DBRDMT SUBQ TIS 1ST 20SQCM/<: CPT

## 2021-10-06 NOTE — PHYSICAL EXAM
[Normal Rate and Rhythm] : normal rate and rhythm [de-identified] : seated at table at home, good hygiene [de-identified] : camila [de-identified] : non labored [de-identified] : limited ambulation [de-identified] : awake, alert , conversant [Please See PDF for [employer]] : Please See PDF for [employer]. [FreeTextEntry1] : left heel

## 2021-10-06 NOTE — PLAN
[FreeTextEntry1] : 9/22/21\par Plan - c/w ioplex, ( pre pt. nurse is having an issue getting, will look into it), xtrasorb over, dynaflex\par no unna for right side - will purchase new compression garment and start wearing\par script given for compression\par follow up 2 weeks\par \par 10/6/21\par s/p excisional debridement \par No clinical sign of infection\par The patient was positioned as follows .  The fluorescence imaging device was positioned 8-12 cm from the patient and the clinical darkness required for imaging was obtained.  The wound measured by [employer]..  The [employer] i:X fluorescence imaging device was used to report presence and location of red or cyan fluorescence, indicative of bacteria at loads greater than 104 CFU/g in real-time.  Images reported to be positive/negative.  Due to presence of infection causing bacteria the wound was cleansed.  Fluorescence images acquired after cleansing reported no reduction in bacteria..  I then turned my attention to debridement, resulting in decrease in the fluorescence image.\par \par \par

## 2021-10-20 ENCOUNTER — APPOINTMENT (OUTPATIENT)
Dept: WOUND CARE | Facility: CLINIC | Age: 76
End: 2021-10-20
Payer: MEDICARE

## 2021-10-20 VITALS
RESPIRATION RATE: 16 BRPM | TEMPERATURE: 97.6 F | DIASTOLIC BLOOD PRESSURE: 77 MMHG | SYSTOLIC BLOOD PRESSURE: 129 MMHG | HEART RATE: 76 BPM

## 2021-10-20 PROCEDURE — 11042 DBRDMT SUBQ TIS 1ST 20SQCM/<: CPT

## 2021-10-20 NOTE — ASSESSMENT
[FreeTextEntry1] : 75 yr old woman with left leg venous ulcer , afib, HTN lymphedema\par   follow up after ablation done 1/26/21. for lt leg\par  data complex - mod lab,xr reviewed\par risk- surgery mod on ac, tolerated debridment well, improved pain\par \par \par 9/22/21\par right leg was edematous last week so unna was applied, no wounds\par needs new compression garment\par left leg has been using ioplex\par s/p excisional debridement\par re-measured for compression garment\par \par 10/20/21\par Wound being treated with ioplex, s/p excisional debridement\par slight periwound maceration noted

## 2021-10-20 NOTE — PLAN
[FreeTextEntry1] : 10/20/21\par Plan - c/w ioplex/xtrasorb/compression wrap\par c/w pump\par orders for nurse given\par follow up 2-3 weeks\par

## 2021-10-20 NOTE — PHYSICAL EXAM
[Normal Rate and Rhythm] : normal rate and rhythm [Please See PDF for [employer]] : Please See PDF for [employer]. [de-identified] : seated at table at home, good hygiene [de-identified] : camila [de-identified] : non labored [de-identified] : limited ambulation [de-identified] : awake, alert , conversant [FreeTextEntry1] : left heel

## 2021-10-24 ENCOUNTER — NON-APPOINTMENT (OUTPATIENT)
Age: 76
End: 2021-10-24

## 2021-10-24 ENCOUNTER — RX RENEWAL (OUTPATIENT)
Age: 76
End: 2021-10-24

## 2021-11-10 ENCOUNTER — RX RENEWAL (OUTPATIENT)
Age: 76
End: 2021-11-10

## 2021-11-10 ENCOUNTER — APPOINTMENT (OUTPATIENT)
Dept: WOUND CARE | Facility: CLINIC | Age: 76
End: 2021-11-10
Payer: MEDICARE

## 2021-11-10 VITALS
SYSTOLIC BLOOD PRESSURE: 147 MMHG | DIASTOLIC BLOOD PRESSURE: 87 MMHG | BODY MASS INDEX: 37.2 KG/M2 | RESPIRATION RATE: 16 BRPM | WEIGHT: 210 LBS | TEMPERATURE: 96.4 F | HEART RATE: 76 BPM

## 2021-11-10 PROCEDURE — 11042 DBRDMT SUBQ TIS 1ST 20SQCM/<: CPT

## 2021-11-12 NOTE — ASSESSMENT
[FreeTextEntry1] : 75 yr old woman with left leg venous ulcer , afib, HTN lymphedema\par   follow up after ablation done 1/26/21. for lt leg\par  data complex - mod lab,xr reviewed\par risk- surgery mod on ac, tolerated debridment well, improved pain\par 9/22/21\par right leg was edematous last week so unna was applied, no wounds\par needs new compression garment\par left leg has been using ioplex\par s/p excisional debridement\par re-measured for compression garment\par 10/20/21\par Wound being treated with ioplex, s/p excisional debridement\par slight periwound maceration noted\par \par 11/10/21\par Left leg venous ulcer, wound decrease in size\par Patient has been using ioplex\par s/p excisional debridment\par periwound maceration \par /\par The patient was positioned to allow for optimization of imaging. The fluorescence imaging device was placed 8-12 cm from the patient and the clinical darkness required for imaging was obtained by a dark drape. The wound measured () on the () . The [employer] i:X fluorescence imaging device was used to report presence and location of cyan fluorescence, indicative of bacteria at loads greater than 104 CFU/g in real-time. Images reported to have cyan fluorescence. The wound was mechanically cleansed with Dakins 0.125% and underwent excisional debridement. Fluorescence images acquired after cleansing demonstrated reduction in bacteria.\par \par

## 2021-11-12 NOTE — PHYSICAL EXAM
[Normal Rate and Rhythm] : normal rate and rhythm [Please See PDF for [employer]] : Please See PDF for [employer]. [de-identified] : seated at table at home, good hygiene [de-identified] : camila [de-identified] : non labored [de-identified] : limited ambulation [de-identified] : awake, alert , conversant [FreeTextEntry1] : left heel

## 2021-11-12 NOTE — PLAN
[FreeTextEntry1] : 11/10/21\par Plan - c/w ioplex/xtrasorb/compression wrap\par c/w pump\par orders for nurse given\par follow up 2-3 weeks\par

## 2021-11-16 ENCOUNTER — APPOINTMENT (OUTPATIENT)
Dept: WOUND CARE | Facility: CLINIC | Age: 76
End: 2021-11-16

## 2021-12-07 ENCOUNTER — APPOINTMENT (OUTPATIENT)
Dept: INTERNAL MEDICINE | Facility: CLINIC | Age: 76
End: 2021-12-07
Payer: MEDICARE

## 2021-12-07 VITALS
HEIGHT: 63 IN | SYSTOLIC BLOOD PRESSURE: 118 MMHG | BODY MASS INDEX: 37.21 KG/M2 | WEIGHT: 210 LBS | OXYGEN SATURATION: 97 % | HEART RATE: 80 BPM | DIASTOLIC BLOOD PRESSURE: 73 MMHG | RESPIRATION RATE: 14 BRPM

## 2021-12-07 VITALS
HEART RATE: 84 BPM | TEMPERATURE: 98 F | SYSTOLIC BLOOD PRESSURE: 118 MMHG | DIASTOLIC BLOOD PRESSURE: 73 MMHG | WEIGHT: 210 LBS | HEIGHT: 63 IN | BODY MASS INDEX: 37.21 KG/M2 | OXYGEN SATURATION: 97 %

## 2021-12-07 DIAGNOSIS — E66.9 OBESITY, UNSPECIFIED: ICD-10-CM

## 2021-12-07 PROCEDURE — G0439: CPT

## 2021-12-07 PROCEDURE — 99214 OFFICE O/P EST MOD 30 MIN: CPT | Mod: 25

## 2021-12-07 NOTE — HISTORY OF PRESENT ILLNESS
[de-identified] : Annual exam\par had 2 covid shot\par decline flu and pneumonia\par aged out of other\par \par RA on prednisone 2.5 bid\par leg ulceration under care\par hx of atrial fib on eliques and beta blocker\par

## 2021-12-07 NOTE — PLAN
[FreeTextEntry1] : Annual exam\par declie flu and pneumonia\par \par leg ulcearton under care from obesity? or varicose vein?\par RA on med\par paf on med\par check for anemia and diabetes and liver and cholesterol and thyroid

## 2021-12-07 NOTE — REVIEW OF SYSTEMS
[Joint Pain] : joint pain [Muscle Pain] : muscle pain [Negative] : Gastrointestinal [de-identified] : xena

## 2021-12-08 LAB
ALBUMIN SERPL ELPH-MCNC: 3.9 G/DL
ALP BLD-CCNC: 78 U/L
ALT SERPL-CCNC: 7 U/L
ANION GAP SERPL CALC-SCNC: 13 MMOL/L
AST SERPL-CCNC: 11 U/L
BASOPHILS # BLD AUTO: 0.04 K/UL
BASOPHILS NFR BLD AUTO: 0.5 %
BILIRUB SERPL-MCNC: 0.2 MG/DL
BUN SERPL-MCNC: 18 MG/DL
CALCIUM SERPL-MCNC: 9.3 MG/DL
CHLORIDE SERPL-SCNC: 104 MMOL/L
CHOLEST SERPL-MCNC: 191 MG/DL
CO2 SERPL-SCNC: 24 MMOL/L
CREAT SERPL-MCNC: 0.6 MG/DL
EOSINOPHIL # BLD AUTO: 0.2 K/UL
EOSINOPHIL NFR BLD AUTO: 2.7 %
ESTIMATED AVERAGE GLUCOSE: 128 MG/DL
FERRITIN SERPL-MCNC: 109 NG/ML
FOLATE SERPL-MCNC: 10.5 NG/ML
GLUCOSE SERPL-MCNC: 119 MG/DL
HBA1C MFR BLD HPLC: 6.1 %
HCT VFR BLD CALC: 38.3 %
HDLC SERPL-MCNC: 50 MG/DL
HGB BLD-MCNC: 11.8 G/DL
IMM GRANULOCYTES NFR BLD AUTO: 1.2 %
IRON SATN MFR SERPL: 16 %
IRON SERPL-MCNC: 52 UG/DL
LDLC SERPL CALC-MCNC: 97 MG/DL
LYMPHOCYTES # BLD AUTO: 0.92 K/UL
LYMPHOCYTES NFR BLD AUTO: 12.3 %
MAN DIFF?: NORMAL
MCHC RBC-ENTMCNC: 30.1 PG
MCHC RBC-ENTMCNC: 30.8 GM/DL
MCV RBC AUTO: 97.7 FL
MONOCYTES # BLD AUTO: 0.65 K/UL
MONOCYTES NFR BLD AUTO: 8.7 %
NEUTROPHILS # BLD AUTO: 5.56 K/UL
NEUTROPHILS NFR BLD AUTO: 74.6 %
NONHDLC SERPL-MCNC: 142 MG/DL
PLATELET # BLD AUTO: 342 K/UL
POTASSIUM SERPL-SCNC: 3.9 MMOL/L
PROT SERPL-MCNC: 7.2 G/DL
RBC # BLD: 3.92 M/UL
RBC # FLD: 14.4 %
SODIUM SERPL-SCNC: 141 MMOL/L
T4 FREE SERPL-MCNC: 1 NG/DL
TIBC SERPL-MCNC: 317 UG/DL
TRIGL SERPL-MCNC: 224 MG/DL
TSH SERPL-ACNC: 5.62 UIU/ML
UIBC SERPL-MCNC: 265 UG/DL
VIT B12 SERPL-MCNC: 327 PG/ML
WBC # FLD AUTO: 7.46 K/UL

## 2022-01-13 ENCOUNTER — RX RENEWAL (OUTPATIENT)
Age: 77
End: 2022-01-13

## 2022-01-14 ENCOUNTER — APPOINTMENT (OUTPATIENT)
Dept: WOUND CARE | Facility: CLINIC | Age: 77
End: 2022-01-14
Payer: MEDICARE

## 2022-01-14 PROCEDURE — 99213 OFFICE O/P EST LOW 20 MIN: CPT | Mod: 95

## 2022-01-14 NOTE — ASSESSMENT
[FreeTextEntry1] : 75 yr old woman with left leg venous ulcer , afib, HTN lymphedema\par   follow up after ablation done 1/26/21. for lt leg\par  data complex - mod lab,xr reviewed\par risk- surgery mod on ac, tolerated debridment well, improved pain\par 9/22/21\par right leg was edematous last week so unna was applied, no wounds\par needs new compression garment\par left leg has been using ioplex\par s/p excisional debridement\par re-measured for compression garment\par 10/20/21\par Wound being treated with ioplex, s/p excisional debridement\par slight periwound maceration noted\par \par 11/10/21\par Left leg venous ulcer, wound decrease in size\par Patient has been using ioplex\par s/p excisional debridment\par periwound maceration \par /\par The patient was positioned to allow for optimization of imaging. The fluorescence imaging device was placed 8-12 cm from the patient and the clinical darkness required for imaging was obtained by a dark drape. The wound measured () on the () . The [employer] i:X fluorescence imaging device was used to report presence and location of cyan fluorescence, indicative of bacteria at loads greater than 104 CFU/g in real-time. Images reported to have cyan fluorescence. The wound was mechanically cleansed with Dakins 0.125% and underwent excisional debridement. Fluorescence images acquired after cleansing demonstrated reduction in bacteria.\par \par 1/14/22\par wound clean and pink, size has hit a plateau according to visiting nurse measurements\par has been using ioplex\par \par

## 2022-01-14 NOTE — PHYSICAL EXAM
[Normal Rate and Rhythm] : normal rate and rhythm [Please See PDF for [employer]] : Please See PDF for [employer]. [de-identified] : seated at table at home, good hygiene [de-identified] : camila [de-identified] : non labored [de-identified] : limited ambulation [de-identified] : awake, alert , conversant [FreeTextEntry1] : left heel

## 2022-01-14 NOTE — HISTORY OF PRESENT ILLNESS
[Home] : at home, [unfilled] , at the time of the visit. [Medical Office: ([facility])___] : at the medical office located in  [Spouse] : spouse [Verbal consent obtained from patient] : the patient, [unfilled] [FreeTextEntry4] : Loi NP [FreeTextEntry1] : Ms. FLORINDA VALVERDE   presents to the office with a recurrent wound for several months duration. Left leg ulcer since 10/9/2020.   \par \par Prior, The wound is located on  the Left lower extremity.  The patient has complaints of mild pain and mild swelling. The patient has been dressing the wound with Ioplex foam and unna boots. The patient denies fevers or chills.  The patient has localized pain to the wound upon dressing changes.  The patient has no other complaints or associated symptoms.\par \par Patient has homecare nurse coming 3x's per week for wound care

## 2022-01-14 NOTE — PLAN
[FreeTextEntry1] : 1/14/22\par Plan - hold ioplex, may resume isabel/compression\par c/w pump\par follow up 3 weeks

## 2022-01-30 ENCOUNTER — RX RENEWAL (OUTPATIENT)
Age: 77
End: 2022-01-30

## 2022-02-10 NOTE — END OF VISIT
[Time Spent: ___ minutes] : I have spent [unfilled] minutes of time on the encounter. [>50% of the face to face encounter time was spent on counseling and/or coordination of care for ___] : Greater than 50% of the face to face encounter time was spent on counseling and/or coordination of care for [unfilled] V-Y Flap Text: The defect edges were debeveled with a #15 scalpel blade.  Given the location of the defect, shape of the defect and the proximity to free margins a V-Y flap was deemed most appropriate.  Using a sterile surgical marker, an appropriate advancement flap was drawn incorporating the defect and placing the expected incisions within the relaxed skin tension lines where possible.    The area thus outlined was incised deep to adipose tissue with a #15 scalpel blade.  The skin margins were undermined to an appropriate distance in all directions utilizing iris scissors.

## 2022-02-22 ENCOUNTER — NON-APPOINTMENT (OUTPATIENT)
Age: 77
End: 2022-02-22

## 2022-02-23 ENCOUNTER — NON-APPOINTMENT (OUTPATIENT)
Age: 77
End: 2022-02-23

## 2022-02-23 ENCOUNTER — RX RENEWAL (OUTPATIENT)
Age: 77
End: 2022-02-23

## 2022-03-28 ENCOUNTER — APPOINTMENT (OUTPATIENT)
Dept: WOUND CARE | Facility: CLINIC | Age: 77
End: 2022-03-28

## 2022-04-06 ENCOUNTER — APPOINTMENT (OUTPATIENT)
Dept: WOUND CARE | Facility: CLINIC | Age: 77
End: 2022-04-06
Payer: MEDICARE

## 2022-04-06 PROCEDURE — 29581 APPL MULTLAYER CMPRN SYS LEG: CPT | Mod: LT

## 2022-04-06 NOTE — ASSESSMENT
[FreeTextEntry1] : 75 yr old woman with left leg venous ulcer , afib, HTN lymphedema\par   follow up after ablation done 1/26/21. for lt leg\par  data complex - mod lab,xr reviewed\par risk- surgery mod on ac, tolerated debridment well, improved pain\par 9/22/21\par right leg was edematous last week so unna was applied, no wounds\par needs new compression garment\par left leg has been using ioplex\par s/p excisional debridement\par re-measured for compression garment\par 10/20/21\par Wound being treated with ioplex, s/p excisional debridement\par slight periwound maceration noted\par \par 11/10/21\par Left leg venous ulcer, wound decrease in size\par Patient has been using ioplex\par s/p excisional debridment\par periwound maceration \par /\par The patient was positioned to allow for optimization of imaging. The fluorescence imaging device was placed 8-12 cm from the patient and the clinical darkness required for imaging was obtained by a dark drape. The wound measured () on the () . The [employer] i:X fluorescence imaging device was used to report presence and location of cyan fluorescence, indicative of bacteria at loads greater than 104 CFU/g in real-time. Images reported to have cyan fluorescence. The wound was mechanically cleansed with Dakins 0.125% and underwent excisional debridement. Fluorescence images acquired after cleansing demonstrated reduction in bacteria.\par \par 1/14/22\par wound clean and pink, size has hit a plateau according to visiting nurse measurements\par has been using ioplex\par 4/6/22\par Apligraf number 2 applied today to patient’s left leg.   Wound bed debrided of bioburden and prepped for product application.  1 of pieces and original size of product) obtained.  Total product usage was 7.7 sq. cm), Total waste 36.3sq. cm) due to wound size.  Product secured with (steri strips and bolster dressing).\par Patient to f/u in 1 week.\par \par The patient was positioned to allow for optimization of imaging. The fluorescence imaging device was placed 8-12 cm from the patient and the clinical darkness required for imaging was obtained by a dark drape. The wound measured   ----- cm2 on the /left ---leg. The [employer] i:X fluorescence imaging device was used to report presence and location of red or cyan fluorescence, indicative of bacteria at loads greater than 104 CFU/g in real-time. \par s/p excisional debridement\par \par \par

## 2022-04-06 NOTE — PHYSICAL EXAM
[Normal Rate and Rhythm] : normal rate and rhythm [Please See PDF for [employer]] : Please See PDF for [employer]. [de-identified] : seated at table at home, good hygiene [de-identified] : camila [de-identified] : non labored [de-identified] : limited ambulation [de-identified] : awake, alert , conversant

## 2022-04-06 NOTE — PLAN
[FreeTextEntry1] : 4/6/22\par Plan - skin sub. applied/veil over\par orders for nurse\par will send photo after 1 week\par follow up 2 weeks

## 2022-04-06 NOTE — HISTORY OF PRESENT ILLNESS
[Home] : at home, [unfilled] , at the time of the visit. [Medical Office: ([facility])___] : at the medical office located in  [Spouse] : spouse [Verbal consent obtained from patient] : the patient, [unfilled] [FreeTextEntry1] : Ms. FLORINDA VALVERDE   presents to the office with a recurrent wound for several months duration. Left leg ulcer since 10/9/2020.   \par \par Prior, The wound is located on  the Left lower extremity.  The patient has complaints of mild pain and mild swelling. The patient has been dressing the wound with Ioplex foam and unna boots. The patient denies fevers or chills.  The patient has localized pain to the wound upon dressing changes.  The patient has no other complaints or associated symptoms.\par \par Patient has homecare nurse coming 3x's per week for wound care [FreeTextEntry4] : Loi NP

## 2022-05-16 ENCOUNTER — APPOINTMENT (OUTPATIENT)
Dept: WOUND CARE | Facility: CLINIC | Age: 77
End: 2022-05-16
Payer: MEDICARE

## 2022-05-16 PROCEDURE — 99213 OFFICE O/P EST LOW 20 MIN: CPT | Mod: 95

## 2022-05-23 ENCOUNTER — APPOINTMENT (OUTPATIENT)
Dept: WOUND CARE | Facility: CLINIC | Age: 77
End: 2022-05-23
Payer: MEDICARE

## 2022-05-23 VITALS
WEIGHT: 200 LBS | BODY MASS INDEX: 35.43 KG/M2 | HEART RATE: 74 BPM | RESPIRATION RATE: 16 BRPM | DIASTOLIC BLOOD PRESSURE: 80 MMHG | TEMPERATURE: 96 F | SYSTOLIC BLOOD PRESSURE: 143 MMHG

## 2022-05-23 PROCEDURE — 15271 SKIN SUB GRAFT TRNK/ARM/LEG: CPT

## 2022-05-23 PROCEDURE — 0598T R-T FLUOR WOUND IMG 1ST SITE: CPT | Mod: LT

## 2022-05-23 NOTE — PHYSICAL EXAM
[Normal Rate and Rhythm] : normal rate and rhythm [de-identified] : seated at table at home, good hygiene [de-identified] : camila [de-identified] : non labored [de-identified] : limited ambulation [de-identified] : awake, alert , conversant

## 2022-05-23 NOTE — HISTORY OF PRESENT ILLNESS
[Home] : at home, [unfilled] , at the time of the visit. [Medical Office: ([facility])___] : at the medical office located in  [Spouse] : spouse [Verbal consent obtained from patient] : the patient, [unfilled] [FreeTextEntry1] : Ms. FLORINDA VALVERDE   presents to the office with a recurrent wound for several months duration. Left leg ulcer since 10/9/2020.   \par \par Prior, The wound is located on  the Left lower extremity.  The patient has complaints of mild pain and mild swelling. The patient has been dressing the wound with Ioplex foam and unna boots. The patient denies fevers or chills.  The patient has localized pain to the wound upon dressing changes.  The patient has no other complaints or associated symptoms.\par \par Patient has homecare nurse coming 3x's per week for wound care [FreeTextEntry4] : Loi NP

## 2022-05-23 NOTE — ASSESSMENT
[FreeTextEntry1] : 75 yr old woman with left leg venous ulcer , afib, HTN lymphedema\par   follow up after ablation done 1/26/21. for lt leg\par  data complex - mod lab,xr reviewed\par risk- surgery mod on ac, tolerated debridment well, improved pain\par 9/22/21\par right leg was edematous last week so unna was applied, no wounds\par needs new compression garment\par left leg has been using ioplex\par s/p excisional debridement\par re-measured for compression garment\par 10/20/21\par Wound being treated with ioplex, s/p excisional debridement\par slight periwound maceration noted\par 11/10/21\par Left leg venous ulcer, wound decrease in size\par Patient has been using ioplex\par s/p excisional debridment\par periwound maceration \par /The patient was positioned to allow for optimization of imaging. The fluorescence imaging device was placed 8-12 cm from the patient and the clinical darkness required for imaging was obtained by a dark drape. The wound measured () on the () . The [employer] i:X fluorescence imaging device was used to report presence and location of cyan fluorescence, indicative of bacteria at loads greater than 104 CFU/g in real-time. Images reported to have cyan fluorescence. The wound was mechanically cleansed with Dakins 0.125% and underwent excisional debridement. Fluorescence images acquired after cleansing demonstrated reduction in bacteria.\par \par 1/14/22\par wound clean and pink, size has hit a plateau according to visiting nurse measurements\par has been using ioplex\par 4/6/22\par Apligraf number 2 applied today to patient’s left leg.   Wound bed debrided of bioburden and prepped for product application.  1 of pieces and original size of product) obtained.  Total product usage was 7.7 sq. cm), Total waste 36.3sq. cm) due to wound size.  Product secured with (steri strips and bolster dressing).\par Patient to f/u in 1 week.\par \par The patient was positioned to allow for optimization of imaging. The fluorescence imaging device was placed 8-12 cm from the patient and the clinical darkness required for imaging was obtained by a dark drape. The wound measured   ----- cm2 on the /left ---leg. The [email or web address] i:X fluorescence imaging device was used to report presence and location of red or cyan fluorescence, indicative of bacteria at loads greater than 104 CFU/g in real-time. \par s/p excisional debridement\par 5/16/22\par wound clean, using isabel, distal 1/3 of wound is starting to pull into center of wound\par 5/23/22\par apligraf number 3 applied today to patient’s left leg.   Wound bed debrided of bioburden and prepped for product application.  1 of pieces and original size of product) obtained.  Total product usage was 12 sq. cm), Total waste  32 sq. cm) due to wound size.  Product secured with (steri strips and bolster dressing).\par Patient to f/u in 1 week.\par The patient was positioned to allow for optimization of imaging. The fluorescence imaging device was placed 8-12 cm from the patient and the clinical darkness required for imaging was obtained by a dark drape. The wound measured   TA on the /left ---leg). The [email or web address] i:X fluorescence imaging device was used to report presence and location of red  fluorescence, indicative of bacteria at loads greater than 104 CFU/g in real-time. Images reported to have ---no/red/cyan----fluorescence. The wound was ----mechanically cleansed with vashe and/or underwent excisional debridement.  Fluorescence images acquired after cleansing demonstrated reduction in bacteria.\par \par \par \par \par \par

## 2022-05-24 NOTE — ASSESSMENT
[FreeTextEntry1] : 75 yr old woman with left leg venous ulcer , afib, HTN lymphedema\par   follow up after ablation done 1/26/21. for lt leg\par  data complex - mod lab,xr reviewed\par risk- surgery mod on ac, tolerated debridment well, improved pain\par 9/22/21\par right leg was edematous last week so unna was applied, no wounds\par needs new compression garment\par left leg has been using ioplex\par s/p excisional debridement\par re-measured for compression garment\par 10/20/21\par Wound being treated with ioplex, s/p excisional debridement\par slight periwound maceration noted\par \par 11/10/21\par Left leg venous ulcer, wound decrease in size\par Patient has been using ioplex\par s/p excisional debridment\par periwound maceration \par /\par The patient was positioned to allow for optimization of imaging. The fluorescence imaging device was placed 8-12 cm from the patient and the clinical darkness required for imaging was obtained by a dark drape. The wound measured () on the () . The [employer] i:X fluorescence imaging device was used to report presence and location of cyan fluorescence, indicative of bacteria at loads greater than 104 CFU/g in real-time. Images reported to have cyan fluorescence. The wound was mechanically cleansed with Dakins 0.125% and underwent excisional debridement. Fluorescence images acquired after cleansing demonstrated reduction in bacteria.\par \par 1/14/22\par wound clean and pink, size has hit a plateau according to visiting nurse measurements\par has been using ioplex\par 4/6/22\par Apligraf number 2 applied today to patient’s left leg.   Wound bed debrided of bioburden and prepped for product application.  1 of pieces and original size of product) obtained.  Total product usage was 7.7 sq. cm), Total waste 36.3sq. cm) due to wound size.  Product secured with (steri strips and bolster dressing).\par Patient to f/u in 1 week.\par \par The patient was positioned to allow for optimization of imaging. The fluorescence imaging device was placed 8-12 cm from the patient and the clinical darkness required for imaging was obtained by a dark drape. The wound measured   ----- cm2 on the /left ---leg. The [employer] i:X fluorescence imaging device was used to report presence and location of red or cyan fluorescence, indicative of bacteria at loads greater than 104 CFU/g in real-time. \par s/p excisional debridement\par 5/16/22\par wound clean, using isabel, distal 1/3 of wound is starting to pull into center of wound\par \par \par

## 2022-05-24 NOTE — HISTORY OF PRESENT ILLNESS
[Home] : at home, [unfilled] , at the time of the visit. [Medical Office: ([facility])___] : at the medical office located in  [Spouse] : spouse [Verbal consent obtained from patient] : the patient, [unfilled] [FreeTextEntry1] : Ms. FLORINDA VALVERDE   presents to the office with a recurrent wound for several months duration. Left leg ulcer since 10/9/2020.   \par \par Prior, The wound is located on  the Left lower extremity.  The patient has complaints of mild pain and mild swelling. The patient has been dressing the wound with Ioplex foam and unna boots. The patient denies fevers or chills.  The patient has localized pain to the wound upon dressing changes.  The patient has no other complaints or associated symptoms.\par \par Patient has homecare nurse coming 3x's per week for wound care [FreeTextEntry4] : Loi NP

## 2022-05-24 NOTE — PLAN
[FreeTextEntry1] : 5/16/22\par Plan - will order skin sub.\par c/w isabel, alternate with remaining ioplex/compression garment\par c/w pump\par follow up next week in office

## 2022-05-24 NOTE — PHYSICAL EXAM
[Normal Rate and Rhythm] : normal rate and rhythm [de-identified] : seated at table at home, good hygiene [de-identified] : camila [de-identified] : non labored [de-identified] : limited ambulation [de-identified] : awake, alert , conversant

## 2022-06-01 ENCOUNTER — APPOINTMENT (OUTPATIENT)
Dept: WOUND CARE | Facility: CLINIC | Age: 77
End: 2022-06-01

## 2022-07-11 ENCOUNTER — NON-APPOINTMENT (OUTPATIENT)
Age: 77
End: 2022-07-11

## 2022-07-27 ENCOUNTER — RX RENEWAL (OUTPATIENT)
Age: 77
End: 2022-07-27

## 2022-08-01 ENCOUNTER — APPOINTMENT (OUTPATIENT)
Dept: WOUND CARE | Facility: CLINIC | Age: 77
End: 2022-08-01

## 2022-08-01 VITALS — TEMPERATURE: 97.3 F

## 2022-08-01 PROCEDURE — 15271 SKIN SUB GRAFT TRNK/ARM/LEG: CPT

## 2022-08-02 NOTE — ASSESSMENT
[FreeTextEntry1] : 75 yr old woman with left leg venous ulcer , afib, HTN lymphedema\par   follow up after ablation done 1/26/21. for lt leg\par  data complex - mod lab,xr reviewed\par risk- surgery mod on ac, tolerated debridment well, improved pain\par 9/22/21\par right leg was edematous last week so unna was applied, no wounds\par needs new compression garment\par left leg has been using ioplex\par s/p excisional debridement\par re-measured for compression garment\par 10/20/21\par Wound being treated with ioplex, s/p excisional debridement\par slight periwound maceration noted\par 11/10/21\par Left leg venous ulcer, wound decrease in size\par Patient has been using ioplex\par s/p excisional debridment\par periwound maceration \par /The patient was positioned to allow for optimization of imaging. The fluorescence imaging device was placed 8-12 cm from the patient and the clinical darkness required for imaging was obtained by a dark drape. The wound measured () on the () . The [employer] i:X fluorescence imaging device was used to report presence and location of cyan fluorescence, indicative of bacteria at loads greater than 104 CFU/g in real-time. Images reported to have cyan fluorescence. The wound was mechanically cleansed with Dakins 0.125% and underwent excisional debridement. Fluorescence images acquired after cleansing demonstrated reduction in bacteria.\par \par 1/14/22\par wound clean and pink, size has hit a plateau according to visiting nurse measurements\par has been using ioplex\par 4/6/22\par Apligraf number 2 applied today to patient’s left leg.   Wound bed debrided of bioburden and prepped for product application.  1 of pieces and original size of product) obtained.  Total product usage was 7.7 sq. cm), Total waste 36.3sq. cm) due to wound size.  Product secured with (steri strips and bolster dressing).\par Patient to f/u in 1 week.\par \par The patient was positioned to allow for optimization of imaging. The fluorescence imaging device was placed 8-12 cm from the patient and the clinical darkness required for imaging was obtained by a dark drape. The wound measured   ----- cm2 on the /left ---leg. The [employer] i:X fluorescence imaging device was used to report presence and location of red or cyan fluorescence, indicative of bacteria at loads greater than 104 CFU/g in real-time. \par s/p excisional debridement\par 5/16/22\par wound clean, using isabel, distal 1/3 of wound is starting to pull into center of wound\par 5/23/22\par apligraf number 3 applied today to patient’s left leg.   Wound bed debrided of bioburden and prepped for product application.  1 of pieces and original size of product) obtained.  Total product usage was 12 sq. cm), Total waste  32 sq. cm) due to wound size.  Product secured with (steri strips and bolster dressing).\par Patient to f/u in 1 week.\par The patient was positioned to allow for optimization of imaging. The fluorescence imaging device was placed 8-12 cm from the patient and the clinical darkness required for imaging was obtained by a dark drape. The wound measured   TA on the /left ---leg). The [employer] i:X fluorescence imaging device was used to report presence and location of red  fluorescence, indicative of bacteria at loads greater than 104 CFU/g in real-time. Images reported to have ---no/red/cyan----fluorescence. The wound was ----mechanically cleansed with vashe and/or underwent excisional debridement.  Fluorescence images acquired after cleansing demonstrated reduction in bacteria.\par 8/1/22\par USING PUMP\par WEARING COMPRESSION RIGHT LEG\par apligraf number () applied today to patient’s (anatomical site).   Wound bed debrided of bioburden and prepped for product application.  (# of pieces and original size of product) obtained.  Total product usage was (x sq. cm), Total waste (x sq. cm) due to wound size.  Product secured with (steri strips and bolster dressing).\par Patient to f/u in 1 week.\par \par \par \par \par \par \par \par

## 2022-08-02 NOTE — PHYSICAL EXAM
[Normal Rate and Rhythm] : normal rate and rhythm [de-identified] : seated at table at home, good hygiene [de-identified] : camila [de-identified] : non labored [de-identified] : limited ambulation [de-identified] : awake, alert , conversant

## 2022-08-02 NOTE — HISTORY OF PRESENT ILLNESS
[Home] : at home, [unfilled] , at the time of the visit. [Medical Office: ([facility])___] : at the medical office located in  [Spouse] : spouse [Verbal consent obtained from patient] : the patient, [unfilled] [FreeTextEntry1] : Ms. FLORINDA VALVERDE   presents to the office with a recurrent wound for several months duration. Left leg ulcer since 10/9/2020.   \par \par Prior, The wound is located on  the Left lower extremity.  The patient has complaints of mild pain and mild swelling. The patient has been dressing the wound with Ioplex foam and unna boots. The patient denies fevers or chills.  The patient has localized pain to the wound upon dressing changes.  The patient has no other complaints or associated symptoms.\par \par Patient has homecare nurse coming 3x's per week for wound care [FreeTextEntry4] : Loi NP

## 2022-10-12 ENCOUNTER — RX RENEWAL (OUTPATIENT)
Age: 77
End: 2022-10-12

## 2022-11-16 ENCOUNTER — APPOINTMENT (OUTPATIENT)
Dept: WOUND CARE | Facility: CLINIC | Age: 77
End: 2022-11-16

## 2022-11-16 VITALS
BODY MASS INDEX: 35.85 KG/M2 | DIASTOLIC BLOOD PRESSURE: 77 MMHG | HEIGHT: 64 IN | WEIGHT: 210 LBS | HEART RATE: 87 BPM | TEMPERATURE: 97.7 F | SYSTOLIC BLOOD PRESSURE: 122 MMHG

## 2022-11-16 PROCEDURE — 15271 SKIN SUB GRAFT TRNK/ARM/LEG: CPT

## 2022-11-16 RX ORDER — IRBESARTAN 75 MG/1
75 TABLET ORAL
Qty: 90 | Refills: 3 | Status: DISCONTINUED | COMMUNITY
Start: 2022-01-06 | End: 2022-11-16

## 2022-11-16 NOTE — PHYSICAL EXAM
[Normal Rate and Rhythm] : normal rate and rhythm [de-identified] : seated at table at home, good hygiene [de-identified] : camila [de-identified] : non labored [de-identified] : limited ambulation [de-identified] : awake, alert , conversant

## 2022-11-16 NOTE — ASSESSMENT
[FreeTextEntry1] : 75 yr old woman with left leg venous ulcer , afib, HTN lymphedema\par   follow up after ablation done 1/26/21. for lt leg\par  data complex - mod lab,xr reviewed\par risk- surgery mod on ac, tolerated debridment well, improved pain\par 9/22/21\par right leg was edematous last week so unna was applied, no wounds\par needs new compression garment\par left leg has been using ioplex\par s/p excisional debridement\par re-measured for compression garment\par 10/20/21\par Wound being treated with ioplex, s/p excisional debridement\par slight periwound maceration noted\par 11/10/21\par Left leg venous ulcer, wound decrease in size\par Patient has been using ioplex\par s/p excisional debridment\par periwound maceration \par /The patient was positioned to allow for optimization of imaging. The fluorescence imaging device was placed 8-12 cm from the patient and the clinical darkness required for imaging was obtained by a dark drape. The wound measured () on the () . The [employer] i:X fluorescence imaging device was used to report presence and location of cyan fluorescence, indicative of bacteria at loads greater than 104 CFU/g in real-time. Images reported to have cyan fluorescence. The wound was mechanically cleansed with Dakins 0.125% and underwent excisional debridement. Fluorescence images acquired after cleansing demonstrated reduction in bacteria.\par \par 1/14/22\par wound clean and pink, size has hit a plateau according to visiting nurse measurements\par has been using ioplex\par 4/6/22\par Apligraf number 2 applied today to patient’s left leg.   Wound bed debrided of bioburden and prepped for product application.  1 of pieces and original size of product) obtained.  Total product usage was 7.7 sq. cm), Total waste 36.3sq. cm) due to wound size.  Product secured with (steri strips and bolster dressing).\par Patient to f/u in 1 week.\par \par The patient was positioned to allow for optimization of imaging. The fluorescence imaging device was placed 8-12 cm from the patient and the clinical darkness required for imaging was obtained by a dark drape. The wound measured   ----- cm2 on the /left ---leg. The [employer] i:X fluorescence imaging device was used to report presence and location of red or cyan fluorescence, indicative of bacteria at loads greater than 104 CFU/g in real-time. \par s/p excisional debridement\par 5/16/22\par wound clean, using isabel, distal 1/3 of wound is starting to pull into center of wound\par 5/23/22\par apligraf number 3 applied today to patient’s left leg.   Wound bed debrided of bioburden and prepped for product application.  1 of pieces and original size of product) obtained.  Total product usage was 12 sq. cm), Total waste  32 sq. cm) due to wound size.  Product secured with (steri strips and bolster dressing).\par Patient to f/u in 1 week.\par The patient was positioned to allow for optimization of imaging. The fluorescence imaging device was placed 8-12 cm from the patient and the clinical darkness required for imaging was obtained by a dark drape. The wound measured   TA on the /left ---leg). The [employer] i:X fluorescence imaging device was used to report presence and location of red  fluorescence, indicative of bacteria at loads greater than 104 CFU/g in real-time. Images reported to have ---no/red/cyan----fluorescence. The wound was ----mechanically cleansed with vashe and/or underwent excisional debridement.  Fluorescence images acquired after cleansing demonstrated reduction in bacteria.\par 8/1/22\par USING PUMP\par WEARING COMPRESSION RIGHT LEG\par apligraf number () applied today to patient’s (anatomical site).   Wound bed debrided of bioburden and prepped for product application.  (# of pieces and original size of product) obtained.  Total product usage was (x sq. cm), Total waste (x sq. cm) due to wound size.  Product secured with (steri strips and bolster dressing).\par Patient to f/u in 1 week.\par \par 11/16/22\par puraply number 1 applied today to patient’s left leg.   Wound bed debrided of bioburden and prepped for product application.  1 of pieces and original size of product) obtained.  Total product usage was 9 sq. cm), Total waste 6  due to wound size.  Product secured with (steri strips and bolster dressing).\par Patient to f/u in 1 week.\par s/p excisional debridement\par \par \par \par \par \par \par \par \par \par

## 2022-11-16 NOTE — PLAN
[FreeTextEntry1] : 11/16/22\par Plan - skin sub. applied/veil over\par orders for nurse given \par follow up TEB

## 2022-11-16 NOTE — HISTORY OF PRESENT ILLNESS
[Home] : at home, [unfilled] , at the time of the visit. [Medical Office: ([facility])___] : at the medical office located in  [Spouse] : spouse [Verbal consent obtained from patient] : the patient, [unfilled] [FreeTextEntry4] : Loi NP [FreeTextEntry1] : Ms. FLORINDA VALVERDE   presents to the office with a recurrent wound for several months duration. Left leg ulcer since 10/9/2020.   \par \par Prior, The wound is located on  the Left lower extremity.  The patient has complaints of mild pain and mild swelling. The patient has been dressing the wound with Ioplex foam and unna boots. The patient denies fevers or chills.  The patient has localized pain to the wound upon dressing changes.  The patient has no other complaints or associated symptoms.\par \par Patient has homecare nurse coming 3x's per week for wound care

## 2022-12-07 ENCOUNTER — APPOINTMENT (OUTPATIENT)
Dept: INTERNAL MEDICINE | Facility: CLINIC | Age: 77
End: 2022-12-07

## 2022-12-07 VITALS
HEIGHT: 64 IN | WEIGHT: 210 LBS | DIASTOLIC BLOOD PRESSURE: 65 MMHG | TEMPERATURE: 97.1 F | BODY MASS INDEX: 35.85 KG/M2 | OXYGEN SATURATION: 97 % | HEART RATE: 68 BPM | SYSTOLIC BLOOD PRESSURE: 119 MMHG

## 2022-12-07 DIAGNOSIS — Z11.59 ENCOUNTER FOR SCREENING FOR OTHER VIRAL DISEASES: ICD-10-CM

## 2022-12-07 PROCEDURE — 99214 OFFICE O/P EST MOD 30 MIN: CPT | Mod: 25

## 2022-12-07 PROCEDURE — G0439: CPT

## 2022-12-07 PROCEDURE — 36415 COLL VENOUS BLD VENIPUNCTURE: CPT

## 2022-12-07 NOTE — PLAN
[FreeTextEntry1] : Annual \par decline flu\par \par \par Recommend f/u rheum\par check lytes uric acid\par wound care\par continue diuretics \par f/u cardiology\par check uric acid, renal and liver\par edema gone\par

## 2022-12-07 NOTE — HISTORY OF PRESENT ILLNESS
[de-identified] : Annual\par decline flu\par had 2 covid shot\par \par Rheumatoid arthitts on predniosone\par saw Dr Mendoza in past\par chronic leg ucler under care\par paf under care of cardiologt Dr García\par eeedma on leg on diuretic\par hypertension on med\par

## 2022-12-07 NOTE — PHYSICAL EXAM
[Normal Rate] : normal rate  [Regular Rhythm] : with a regular rhythm [Normal] : soft, non-tender, non-distended, no masses palpated, no HSM and normal bowel sounds [de-identified] : NSR [de-identified] : Rheum deformity of hands

## 2022-12-07 NOTE — REVIEW OF SYSTEMS
[Frequency] : frequency [Joint Pain] : joint pain [Muscle Pain] : muscle pain [Negative] : Gastrointestinal

## 2022-12-09 ENCOUNTER — APPOINTMENT (OUTPATIENT)
Dept: WOUND CARE | Facility: CLINIC | Age: 77
End: 2022-12-09

## 2022-12-09 PROCEDURE — 99214 OFFICE O/P EST MOD 30 MIN: CPT | Mod: 95

## 2022-12-10 LAB
ALBUMIN SERPL ELPH-MCNC: 4 G/DL
ALP BLD-CCNC: 85 U/L
ALT SERPL-CCNC: 9 U/L
ANION GAP SERPL CALC-SCNC: 13 MMOL/L
AST SERPL-CCNC: 12 U/L
BASOPHILS # BLD AUTO: 0.06 K/UL
BASOPHILS NFR BLD AUTO: 0.7 %
BILIRUB SERPL-MCNC: 0.3 MG/DL
BUN SERPL-MCNC: 25 MG/DL
CALCIUM SERPL-MCNC: 9.7 MG/DL
CHLORIDE SERPL-SCNC: 101 MMOL/L
CHOLEST SERPL-MCNC: 203 MG/DL
CO2 SERPL-SCNC: 28 MMOL/L
COVID-19 NUCLEOCAPSID  GAM ANTIBODY INTERPRETATION: POSITIVE
CREAT SERPL-MCNC: 0.65 MG/DL
EGFR: 90 ML/MIN/1.73M2
EOSINOPHIL # BLD AUTO: 0.19 K/UL
EOSINOPHIL NFR BLD AUTO: 2.3 %
ESTIMATED AVERAGE GLUCOSE: 131 MG/DL
GLUCOSE SERPL-MCNC: 103 MG/DL
HBA1C MFR BLD HPLC: 6.2 %
HCT VFR BLD CALC: 38.2 %
HDLC SERPL-MCNC: 46 MG/DL
HGB BLD-MCNC: 11.8 G/DL
IMM GRANULOCYTES NFR BLD AUTO: 1.1 %
LDLC SERPL CALC-MCNC: 105 MG/DL
LYMPHOCYTES # BLD AUTO: 1.24 K/UL
LYMPHOCYTES NFR BLD AUTO: 14.8 %
MAN DIFF?: NORMAL
MCHC RBC-ENTMCNC: 29.9 PG
MCHC RBC-ENTMCNC: 30.9 GM/DL
MCV RBC AUTO: 97 FL
MONOCYTES # BLD AUTO: 0.63 K/UL
MONOCYTES NFR BLD AUTO: 7.5 %
NEUTROPHILS # BLD AUTO: 6.15 K/UL
NEUTROPHILS NFR BLD AUTO: 73.6 %
NONHDLC SERPL-MCNC: 157 MG/DL
PLATELET # BLD AUTO: 296 K/UL
POTASSIUM SERPL-SCNC: 4 MMOL/L
PROT SERPL-MCNC: 7.1 G/DL
RBC # BLD: 3.94 M/UL
RBC # FLD: 14.6 %
SARS-COV-2 AB SERPL QL IA: 50.3 INDEX
SODIUM SERPL-SCNC: 141 MMOL/L
T4 FREE SERPL-MCNC: 1.2 NG/DL
TRIGL SERPL-MCNC: 259 MG/DL
TSH SERPL-ACNC: 3.04 UIU/ML
URATE SERPL-MCNC: 9.1 MG/DL
WBC # FLD AUTO: 8.36 K/UL

## 2022-12-12 NOTE — ASSESSMENT
[FreeTextEntry1] : 75 yr old woman with left leg venous ulcer , afib, HTN lymphedema\par   follow up after ablation done 1/26/21. for lt leg\par  data complex - mod lab,xr reviewed\par risk- surgery mod on ac, tolerated debridment well, improved pain\par 9/22/21\par right leg was edematous last week so unna was applied, no wounds\par needs new compression garment\par left leg has been using ioplex\par s/p excisional debridement\par re-measured for compression garment\par 10/20/21\par Wound being treated with ioplex, s/p excisional debridement\par slight periwound maceration noted\par 11/10/21\par Left leg venous ulcer, wound decrease in size\par Patient has been using ioplex\par s/p excisional debridment\par periwound maceration \par /The patient was positioned to allow for optimization of imaging. The fluorescence imaging device was placed 8-12 cm from the patient and the clinical darkness required for imaging was obtained by a dark drape. The wound measured () on the () . The [employer] i:X fluorescence imaging device was used to report presence and location of cyan fluorescence, indicative of bacteria at loads greater than 104 CFU/g in real-time. Images reported to have cyan fluorescence. The wound was mechanically cleansed with Dakins 0.125% and underwent excisional debridement. Fluorescence images acquired after cleansing demonstrated reduction in bacteria.\par \par 1/14/22\par wound clean and pink, size has hit a plateau according to visiting nurse measurements\par has been using ioplex\par 4/6/22\par Apligraf number 2 applied today to patient’s left leg.   Wound bed debrided of bioburden and prepped for product application.  1 of pieces and original size of product) obtained.  Total product usage was 7.7 sq. cm), Total waste 36.3sq. cm) due to wound size.  Product secured with (steri strips and bolster dressing).\par Patient to f/u in 1 week.\par \par The patient was positioned to allow for optimization of imaging. The fluorescence imaging device was placed 8-12 cm from the patient and the clinical darkness required for imaging was obtained by a dark drape. The wound measured   ----- cm2 on the /left ---leg. The [employer] i:X fluorescence imaging device was used to report presence and location of red or cyan fluorescence, indicative of bacteria at loads greater than 104 CFU/g in real-time. \par s/p excisional debridement\par 5/16/22\par wound clean, using isabel, distal 1/3 of wound is starting to pull into center of wound\par 5/23/22\par apligraf number 3 applied today to patient’s left leg.   Wound bed debrided of bioburden and prepped for product application.  1 of pieces and original size of product) obtained.  Total product usage was 12 sq. cm), Total waste  32 sq. cm) due to wound size.  Product secured with (steri strips and bolster dressing).\par Patient to f/u in 1 week.\par The patient was positioned to allow for optimization of imaging. The fluorescence imaging device was placed 8-12 cm from the patient and the clinical darkness required for imaging was obtained by a dark drape. The wound measured   TA on the /left ---leg). The [employer] i:X fluorescence imaging device was used to report presence and location of red  fluorescence, indicative of bacteria at loads greater than 104 CFU/g in real-time. Images reported to have ---no/red/cyan----fluorescence. The wound was ----mechanically cleansed with vashe and/or underwent excisional debridement.  Fluorescence images acquired after cleansing demonstrated reduction in bacteria.\par 8/1/22\par USING PUMP\par WEARING COMPRESSION RIGHT LEG\par apligraf number () applied today to patient’s (anatomical site).   Wound bed debrided of bioburden and prepped for product application.  (# of pieces and original size of product) obtained.  Total product usage was (x sq. cm), Total waste (x sq. cm) due to wound size.  Product secured with (steri strips and bolster dressing).\par Patient to f/u in 1 week.\par \par 11/16/22\par puraply number 1 applied today to patient’s left leg.   Wound bed debrided of bioburden and prepped for product application.  1 of pieces and original size of product) obtained.  Total product usage was 9 sq. cm), Total waste 6  due to wound size.  Product secured with (steri strips and bolster dressing).\par Patient to f/u in 1 week.\par s/p excisional debridement\par \par 12/9/22\par right leg, s/p evacuation hematoma and underlying venous insufficiency, has puraply application 2 weeks ago, at 6 0'clock island of skin and also in center of wound noted\par \par \par \par \par \par \par \par \par \par \par

## 2022-12-12 NOTE — PLAN
[FreeTextEntry1] : 12/9/22\par Plan - resume isabel/aquacel/unna\par c/w pump\par skin sub. to be ordered\par follow up next visit in office

## 2022-12-12 NOTE — PHYSICAL EXAM
[Normal Rate and Rhythm] : normal rate and rhythm [de-identified] : seated at table at home, good hygiene [de-identified] : camila [de-identified] : non labored [de-identified] : limited ambulation [de-identified] : awake, alert , conversant

## 2022-12-12 NOTE — HISTORY OF PRESENT ILLNESS
[Home] : at home, [unfilled] , at the time of the visit. [Medical Office: ([facility])___] : at the medical office located in  [Verbal consent obtained from patient] : the patient, [unfilled] [FreeTextEntry4] : Loi NP [FreeTextEntry1] : Ms. FLORINDA VALVERDE   presents to the office with a recurrent wound for several months duration. Left leg ulcer since 10/9/2020.   \par \par Prior, The wound is located on  the Left lower extremity.  The patient has complaints of mild pain and mild swelling. The patient has been dressing the wound with Ioplex foam and unna boots. The patient denies fevers or chills.  The patient has localized pain to the wound upon dressing changes.  The patient has no other complaints or associated symptoms.\par \par Patient has homecare nurse coming 3x's per week for wound care

## 2022-12-28 ENCOUNTER — APPOINTMENT (OUTPATIENT)
Dept: WOUND CARE | Facility: CLINIC | Age: 77
End: 2022-12-28
Payer: MEDICARE

## 2022-12-28 VITALS — DIASTOLIC BLOOD PRESSURE: 81 MMHG | RESPIRATION RATE: 17 BRPM | SYSTOLIC BLOOD PRESSURE: 137 MMHG | HEART RATE: 74 BPM

## 2022-12-28 VITALS — TEMPERATURE: 97.3 F

## 2022-12-28 PROCEDURE — 15271 SKIN SUB GRAFT TRNK/ARM/LEG: CPT

## 2022-12-30 NOTE — ASSESSMENT
[FreeTextEntry1] : 75 yr old woman with left leg venous ulcer , afib, HTN lymphedema\par   follow up after ablation done 1/26/21. for lt leg\par  data complex - mod lab,xr reviewed\par risk- surgery mod on ac, tolerated debridment well, improved pain\par 9/22/21\par right leg was edematous last week so unna was applied, no wounds\par needs new compression garment\par left leg has been using ioplex\par s/p excisional debridement\par re-measured for compression garment\par 10/20/21\par Wound being treated with ioplex, s/p excisional debridement\par slight periwound maceration noted\par 11/10/21\par Left leg venous ulcer, wound decrease in size\par Patient has been using ioplex\par s/p excisional debridement\par periwound maceration \par /The patient was positioned to allow for optimization of imaging. The fluorescence imaging device was placed 8-12 cm from the patient and the clinical darkness required for imaging was obtained by a dark drape. The wound measured () on the () . The mii i:X fluorescence imaging device was used to report presence and location of cyan fluorescence, indicative of bacteria at loads greater than 104 CFU/g in real-time. Images reported to have cyan fluorescence. The wound was mechanically cleansed with Dakins 0.125% and underwent excisional debridement. Fluorescence images acquired after cleansing demonstrated reduction in bacteria.\par \par 1/14/22\par wound clean and pink, size has hit a plateau according to visiting nurse measurements\par has been using ioplex\par 4/6/22\par Apligraf number 2 applied today to patient’s left leg.   Wound bed debrided of bioburden and prepped for product application.  1 of pieces and original size of product) obtained.  Total product usage was 7.7 sq. cm), Total waste 36.3sq. cm) due to wound size.  Product secured with (steri strips and bolster dressing).\par Patient to f/u in 1 week.\par \par The patient was positioned to allow for optimization of imaging. The fluorescence imaging device was placed 8-12 cm from the patient and the clinical darkness required for imaging was obtained by a dark drape. The wound measured   ----- cm2 on the /left ---leg. The [employer] i:X fluorescence imaging device was used to report presence and location of red or cyan fluorescence, indicative of bacteria at loads greater than 104 CFU/g in real-time. \par s/p excisional debridement\par 5/16/22\par wound clean, using isabel, distal 1/3 of wound is starting to pull into center of wound\par 5/23/22\par apligraf number 3 applied today to patient’s left leg.   Wound bed debrided of bioburden and prepped for product application.  1 of pieces and original size of product) obtained.  Total product usage was 12 sq. cm), Total waste  32 sq. cm) due to wound size.  Product secured with (steri strips and bolster dressing).\par Patient to f/u in 1 week.\par The patient was positioned to allow for optimization of imaging. The fluorescence imaging device was placed 8-12 cm from the patient and the clinical darkness required for imaging was obtained by a dark drape. The wound measured   TA on the /left ---leg). The [employer] i:X fluorescence imaging device was used to report presence and location of red  fluorescence, indicative of bacteria at loads greater than 104 CFU/g in real-time. Images reported to have ---no/red/cyan----fluorescence. The wound was ----mechanically cleansed with vashe and/or underwent excisional debridement.  Fluorescence images acquired after cleansing demonstrated reduction in bacteria.\par 8/1/22\par USING PUMP\par WEARING COMPRESSION RIGHT LEG\par apligraf number () applied today to patient’s (anatomical site).   Wound bed debrided of bioburden and prepped for product application.  (# of pieces and original size of product) obtained.  Total product usage was (x sq. cm), Total waste (x sq. cm) due to wound size.  Product secured with (steri strips and bolster dressing).\par Patient to f/u in 1 week.\par \par 11/16/22\par puraply number 1 applied today to patient’s left leg.   Wound bed debrided of bioburden and prepped for product application.  1 of pieces and original size of product) obtained.  Total product usage was 9 sq. cm), Total waste 6  due to wound size.  Product secured with (steri strips and bolster dressing).\par Patient to f/u in 1 week.\par s/p excisional debridement\par \par 12/9/22\par right leg, s/p evacuation hematoma and underlying venous insufficiency, has puraply application 2 weeks ago, at 6 0'clock island of skin and also in center of wound noted\par \par \par 12/28/2022\par puraply number 2 applied today to patient’s left leg.   Wound bed debrided of bioburden and prepped for product application.  1 of pieces and original size of product) obtained.  Total product usage was 9 sq. cm), Total waste 6  due to wound size.  Product secured with (steri strips and bolster dressing).\par Patient to f/u in 1 week.\par s/p excisional debridement\par No clinical sign of infection\par The patient was positioned as follows .  The fluorescence imaging device was positioned 8-12 cm from the patient and the clinical darkness required for imaging was obtained.  The wound measured by [employer]..  The mii i:X fluorescence imaging device was used to report presence and location of red or cyan fluorescence, indicative of bacteria at loads greater than 104 CFU/g in real-time.  Images reported to be negative.  Due to presence of infection causing bacteria the wound was cleansed.e.\par \par \par \par \par \par \par \par \par

## 2022-12-30 NOTE — PLAN
[FreeTextEntry1] : 12/28/22\par Plan -  Skin sub applied, veil over it, xtrasorb, dynaflex\par c/w pump\par skin sub. to be ordered\par follow up next visit in office

## 2022-12-30 NOTE — PHYSICAL EXAM
[Normal Rate and Rhythm] : normal rate and rhythm [de-identified] : seated at table at home, good hygiene [de-identified] : camila [de-identified] : non labored [de-identified] : limited ambulation [de-identified] : awake, alert , conversant

## 2023-01-05 ENCOUNTER — RX RENEWAL (OUTPATIENT)
Age: 78
End: 2023-01-05

## 2023-01-11 ENCOUNTER — APPOINTMENT (OUTPATIENT)
Dept: WOUND CARE | Facility: CLINIC | Age: 78
End: 2023-01-11
Payer: MEDICARE

## 2023-01-11 VITALS — TEMPERATURE: 97.6 F

## 2023-01-11 PROCEDURE — 11042 DBRDMT SUBQ TIS 1ST 20SQCM/<: CPT

## 2023-01-12 NOTE — PLAN
[FreeTextEntry1] : 12/28/22\par Plan -  Skin sub applied, veil over it, xtrasorb, dynaflex\par c/w pump\par skin sub. to be ordered\par follow up next visit in office\par \par 1/12/23\par repeat vascular testing\par

## 2023-01-12 NOTE — ASSESSMENT
[FreeTextEntry1] : 75 yr old woman with left leg venous ulcer , afib, HTN lymphedema\par   follow up after ablation done 1/26/21. for lt leg\par  data complex - mod lab,xr reviewed\par risk- surgery mod on ac, tolerated debridment well, improved pain\par 9/22/21\par right leg was edematous last week so unna was applied, no wounds\par needs new compression garment\par left leg has been using ioplex\par s/p excisional debridement\par re-measured for compression garment\par 10/20/21\par Wound being treated with ioplex, s/p excisional debridement\par slight periwound maceration noted\par 11/10/21\par Left leg venous ulcer, wound decrease in size\par Patient has been using ioplex\par s/p excisional debridement\par periwound maceration \par /The patient was positioned to allow for optimization of imaging. The fluorescence imaging device was placed 8-12 cm from the patient and the clinical darkness required for imaging was obtained by a dark drape. The wound measured () on the () . The [employer] i:X fluorescence imaging device was used to report presence and location of cyan fluorescence, indicative of bacteria at loads greater than 104 CFU/g in real-time. Images reported to have cyan fluorescence. The wound was mechanically cleansed with Dakins 0.125% and underwent excisional debridement. Fluorescence images acquired after cleansing demonstrated reduction in bacteria.\par \par 1/14/22\par wound clean and pink, size has hit a plateau according to visiting nurse measurements\par has been using ioplex\par 4/6/22\par Apligraf number 2 applied today to patient’s left leg.   Wound bed debrided of bioburden and prepped for product application.  1 of pieces and original size of product) obtained.  Total product usage was 7.7 sq. cm), Total waste 36.3sq. cm) due to wound size.  Product secured with (steri strips and bolster dressing).\par Patient to f/u in 1 week.\par \par The patient was positioned to allow for optimization of imaging. The fluorescence imaging device was placed 8-12 cm from the patient and the clinical darkness required for imaging was obtained by a dark drape. The wound measured   ----- cm2 on the /left ---leg. The [employer]gt i:X fluorescence imaging device was used to report presence and location of red or cyan fluorescence, indicative of bacteria at loads greater than 104 CFU/g in real-time. \par s/p excisional debridement\par 5/16/22\par wound clean, using isabel, distal 1/3 of wound is starting to pull into center of wound\par 5/23/22\par apligraf number 3 applied today to patient’s left leg.   Wound bed debrided of bioburden and prepped for product application.  1 of pieces and original size of product) obtained.  Total product usage was 12 sq. cm), Total waste  32 sq. cm) due to wound size.  Product secured with (steri strips and bolster dressing).\par Patient to f/u in 1 week.\par The patient was positioned to allow for optimization of imaging. The fluorescence imaging device was placed 8-12 cm from the patient and the clinical darkness required for imaging was obtained by a dark drape. The wound measured   TA on the /left ---leg). The [employer]riggt i:X fluorescence imaging device was used to report presence and location of red  fluorescence, indicative of bacteria at loads greater than 104 CFU/g in real-time. Images reported to have ---no/red/cyan----fluorescence. The wound was ----mechanically cleansed with vashe and/or underwent excisional debridement.  Fluorescence images acquired after cleansing demonstrated reduction in bacteria.\par 8/1/22\par USING PUMP\par WEARING COMPRESSION RIGHT LEG\par apligraf number () applied today to patient’s (anatomical site).   Wound bed debrided of bioburden and prepped for product application.  (# of pieces and original size of product) obtained.  Total product usage was (x sq. cm), Total waste (x sq. cm) due to wound size.  Product secured with (steri strips and bolster dressing).\par Patient to f/u in 1 week.\par \par 11/16/22\par puraply number 1 applied today to patient’s left leg.   Wound bed debrided of bioburden and prepped for product application.  1 of pieces and original size of product) obtained.  Total product usage was 9 sq. cm), Total waste 6  due to wound size.  Product secured with (steri strips and bolster dressing).\par Patient to f/u in 1 week.\par s/p excisional debridement\par \par 12/9/22\par right leg, s/p evacuation hematoma and underlying venous insufficiency, has puraply application 2 weeks ago, at 6 0'clock island of skin and also in center of wound noted\par \par \par 12/28/2022\par puraply number 2 applied today to patient’s left leg.   Wound bed debrided of bioburden and prepped for product application.  1 of pieces and original size of product) obtained.  Total product usage was 9 sq. cm), Total waste 6  due to wound size.  Product secured with (steri strips and bolster dressing).\par Patient to f/u in 1 week.\par s/p excisional debridement\par No clinical sign of infection\par The patient was positioned as follows .  The fluorescence imaging device was positioned 8-12 cm from the patient and the clinical darkness required for imaging was obtained.  The wound measured by Tissue [employer]..  The [employer] i:X fluorescence imaging device was used to report presence and location of red or cyan fluorescence, indicative of bacteria at loads greater than 104 CFU/g in real-time.  Images reported to be negative.  Due to presence of infection causing bacteria the wound was cleansed.e.\par \par \par \par \par \par \par \par \par

## 2023-01-12 NOTE — PHYSICAL EXAM
[Normal Rate and Rhythm] : normal rate and rhythm [de-identified] : seated at table at home, good hygiene [de-identified] : camila [de-identified] : non labored [de-identified] : limited ambulation [de-identified] : awake, alert , conversant

## 2023-01-25 ENCOUNTER — RX RENEWAL (OUTPATIENT)
Age: 78
End: 2023-01-25

## 2023-02-01 ENCOUNTER — APPOINTMENT (OUTPATIENT)
Dept: WOUND CARE | Facility: CLINIC | Age: 78
End: 2023-02-01
Payer: MEDICARE

## 2023-02-01 PROCEDURE — 15271 SKIN SUB GRAFT TRNK/ARM/LEG: CPT

## 2023-02-02 NOTE — ASSESSMENT
[FreeTextEntry1] : 75 yr old woman with left leg venous ulcer , afib, HTN lymphedema\par   follow up after ablation done 1/26/21. for lt leg\par  data complex - mod lab,xr reviewed\par risk- surgery mod on ac, tolerated debridment well, improved pain\par 9/22/21\par right leg was edematous last week so unna was applied, no wounds\par needs new compression garment\par left leg has been using ioplex\par s/p excisional debridement\par re-measured for compression garment\par 10/20/21\par Wound being treated with ioplex, s/p excisional debridement\par slight periwound maceration noted\par 11/10/21\par Left leg venous ulcer, wound decrease in size\par Patient has been using ioplex\par s/p excisional debridement\par periwound maceration \par /The patient was positioned to allow for optimization of imaging. The fluorescence imaging device was placed 8-12 cm from the patient and the clinical darkness required for imaging was obtained by a dark drape. The wound measured () on the () . The [employer] i:X fluorescence imaging device was used to report presence and location of cyan fluorescence, indicative of bacteria at loads greater than 104 CFU/g in real-time. Images reported to have cyan fluorescence. The wound was mechanically cleansed with Dakins 0.125% and underwent excisional debridement. Fluorescence images acquired after cleansing demonstrated reduction in bacteria.\par \par 1/14/22\par wound clean and pink, size has hit a plateau according to visiting nurse measurements\par has been using ioplex\par 4/6/22\par Apligraf number 2 applied today to patient’s left leg.   Wound bed debrided of bioburden and prepped for product application.  1 of pieces and original size of product) obtained.  Total product usage was 7.7 sq. cm), Total waste 36.3sq. cm) due to wound size.  Product secured with (steri strips and bolster dressing).\par Patient to f/u in 1 week.\par \par The patient was positioned to allow for optimization of imaging. The fluorescence imaging device was placed 8-12 cm from the patient and the clinical darkness required for imaging was obtained by a dark drape. The wound measured   ----- cm2 on the /left ---leg. The [employer] i:X fluorescence imaging device was used to report presence and location of red or cyan fluorescence, indicative of bacteria at loads greater than 104 CFU/g in real-time. \par s/p excisional debridement\par 5/16/22\par wound clean, using isabel, distal 1/3 of wound is starting to pull into center of wound\par 5/23/22\par apligraf number 3 applied today to patient’s left leg.   Wound bed debrided of bioburden and prepped for product application.  1 of pieces and original size of product) obtained.  Total product usage was 12 sq. cm), Total waste  32 sq. cm) due to wound size.  Product secured with (steri strips and bolster dressing).\par Patient to f/u in 1 week.\par The patient was positioned to allow for optimization of imaging. The fluorescence imaging device was placed 8-12 cm from the patient and the clinical darkness required for imaging was obtained by a dark drape. The wound measured   TA on the /left ---leg). The [employer] i:X fluorescence imaging device was used to report presence and location of red  fluorescence, indicative of bacteria at loads greater than 104 CFU/g in real-time. Images reported to have ---no/red/cyan----fluorescence. The wound was ----mechanically cleansed with vashe and/or underwent excisional debridement.  Fluorescence images acquired after cleansing demonstrated reduction in bacteria.\par 8/1/22\par USING PUMP\par WEARING COMPRESSION RIGHT LEG\par apligraf number () applied today to patient’s (anatomical site).   Wound bed debrided of bioburden and prepped for product application.  (# of pieces and original size of product) obtained.  Total product usage was (x sq. cm), Total waste (x sq. cm) due to wound size.  Product secured with (steri strips and bolster dressing).\par Patient to f/u in 1 week.\par \par 11/16/22\par puraply number 1 applied today to patient’s left leg.   Wound bed debrided of bioburden and prepped for product application.  1 of pieces and original size of product) obtained.  Total product usage was 9 sq. cm), Total waste 6  due to wound size.  Product secured with (steri strips and bolster dressing).\par Patient to f/u in 1 week.\par s/p excisional debridement\par \par 12/9/22\par right leg, s/p evacuation hematoma and underlying venous insufficiency, has puraply application 2 weeks ago, at 6 0'clock island of skin and also in center of wound noted\par \par \par 12/28/2022\par puraply number 2 applied today to patient’s left leg.   Wound bed debrided of bioburden and prepped for product application.  1 of pieces and original size of product) obtained.  Total product usage was 9 sq. cm), Total waste 6  due to wound size.  Product secured with (steri strips and bolster dressing).\par Patient to f/u in 1 week.\par s/p excisional debridement\par No clinical sign of infection\par The patient was positioned as follows .  The fluorescence imaging device was positioned 8-12 cm from the patient and the clinical darkness required for imaging was obtained.  The wound measured by Tissue [employer]..  The [employer] i:X fluorescence imaging device was used to report presence and location of red or cyan fluorescence, indicative of bacteria at loads greater than 104 CFU/g in real-time.  Images reported to be negative.  Due to presence of infection causing bacteria the wound was cleansed.e.\par \par \par \par \par \par \par \par \par

## 2023-02-02 NOTE — PHYSICAL EXAM
[Normal Rate and Rhythm] : normal rate and rhythm [de-identified] : seated at table at home, good hygiene [de-identified] : camila [de-identified] : non labored [de-identified] : limited ambulation [de-identified] : awake, alert , conversant

## 2023-02-02 NOTE — PLAN
[FreeTextEntry1] : 12/28/22\par Plan -  Skin sub applied, veil over it, xtrasorb, dynaflex\par c/w pump\par skin sub. to be ordered\par follow up next visit in office\par \par 1/12/23\par repeat vascular testing\par \par 2/1/23\par Plan: puraply placed to L leg, wound veil, xtrasorb, dynaflex\par applied today to patient’s LLE.   Wound bed debrided of bioburden and prepped for product application.  (#1  of 10 unit pieces and original size of product) obtained.  Total product usage was (5.42 x sq. cm), Total waste (4.58 x sq. cm) due to wound size.  Product secured with steri strips and bolster dressing.\par Patient to f/u in 1 week.\par \par \par VNS orders given\par Puraply ordered for next visit 2/15

## 2023-02-15 ENCOUNTER — APPOINTMENT (OUTPATIENT)
Dept: WOUND CARE | Facility: CLINIC | Age: 78
End: 2023-02-15
Payer: MEDICARE

## 2023-02-15 VITALS
DIASTOLIC BLOOD PRESSURE: 81 MMHG | SYSTOLIC BLOOD PRESSURE: 136 MMHG | RESPIRATION RATE: 18 BRPM | OXYGEN SATURATION: 94 % | HEART RATE: 73 BPM

## 2023-02-15 PROCEDURE — 15271 SKIN SUB GRAFT TRNK/ARM/LEG: CPT

## 2023-02-15 NOTE — PHYSICAL EXAM
[Normal Rate and Rhythm] : normal rate and rhythm [de-identified] : seated at table at home, good hygiene [de-identified] : camila [de-identified] : non labored [de-identified] : limited ambulation [de-identified] : awake, alert , conversant

## 2023-02-15 NOTE — ASSESSMENT
[FreeTextEntry1] : 75 yr old woman with left leg venous ulcer , afib, HTN lymphedema\par   follow up after ablation done 1/26/21. for lt leg\par  data complex - mod lab,xr reviewed\par risk- surgery mod on ac, tolerated debridment well, improved pain\par 9/22/21\par right leg was edematous last week so unna was applied, no wounds\par needs new compression garment\par left leg has been using ioplex\par s/p excisional debridement\par re-measured for compression garment\par 10/20/21\par Wound being treated with ioplex, s/p excisional debridement\par slight periwound maceration noted\par 11/10/21\par Left leg venous ulcer, wound decrease in size\par Patient has been using ioplex\par s/p excisional debridement\par periwound maceration \par /The patient was positioned to allow for optimization of imaging. The fluorescence imaging device was placed 8-12 cm from the patient and the clinical darkness required for imaging was obtained by a dark drape. The wound measured () on the () . The Xormis i:X fluorescence imaging device was used to report presence and location of cyan fluorescence, indicative of bacteria at loads greater than 104 CFU/g in real-time. Images reported to have cyan fluorescence. The wound was mechanically cleansed with Dakins 0.125% and underwent excisional debridement. Fluorescence images acquired after cleansing demonstrated reduction in bacteria.\par \par 1/14/22\par wound clean and pink, size has hit a plateau according to visiting nurse measurements\par has been using ioplex\par 4/6/22\par Apligraf number 2 applied today to patient’s left leg.   Wound bed debrided of bioburden and prepped for product application.  1 of pieces and original size of product) obtained.  Total product usage was 7.7 sq. cm), Total waste 36.3sq. cm) due to wound size.  Product secured with (steri strips and bolster dressing).\par Patient to f/u in 1 week.\par \par The patient was positioned to allow for optimization of imaging. The fluorescence imaging device was placed 8-12 cm from the patient and the clinical darkness required for imaging was obtained by a dark drape. The wound measured   ----- cm2 on the /left ---leg. The [employer] i:X fluorescence imaging device was used to report presence and location of red or cyan fluorescence, indicative of bacteria at loads greater than 104 CFU/g in real-time. \par s/p excisional debridement\par 5/16/22\par wound clean, using isabel, distal 1/3 of wound is starting to pull into center of wound\par 5/23/22\par apligraf number 3 applied today to patient’s left leg.   Wound bed debrided of bioburden and prepped for product application.  1 of pieces and original size of product) obtained.  Total product usage was 12 sq. cm), Total waste  32 sq. cm) due to wound size.  Product secured with (steri strips and bolster dressing).\par Patient to f/u in 1 week.\par The patient was positioned to allow for optimization of imaging. The fluorescence imaging device was placed 8-12 cm from the patient and the clinical darkness required for imaging was obtained by a dark drape. The wound measured   TA on the /left ---leg). The [employer] i:X fluorescence imaging device was used to report presence and location of red  fluorescence, indicative of bacteria at loads greater than 104 CFU/g in real-time. Images reported to have ---no/red/cyan----fluorescence. The wound was ----mechanically cleansed with vashe and/or underwent excisional debridement.  Fluorescence images acquired after cleansing demonstrated reduction in bacteria.\par 8/1/22\par USING PUMP\par WEARING COMPRESSION RIGHT LEG\par apligraf number () applied today to patient’s (anatomical site).   Wound bed debrided of bioburden and prepped for product application.  (# of pieces and original size of product) obtained.  Total product usage was (x sq. cm), Total waste (x sq. cm) due to wound size.  Product secured with (steri strips and bolster dressing).\par Patient to f/u in 1 week.\par \par 11/16/22\par puraply number 1 applied today to patient’s left leg.   Wound bed debrided of bioburden and prepped for product application.  1 of pieces and original size of product) obtained.  Total product usage was 9 sq. cm), Total waste 6  due to wound size.  Product secured with (steri strips and bolster dressing).\par Patient to f/u in 1 week.\par s/p excisional debridement\par \par 12/9/22\par right leg, s/p evacuation hematoma and underlying venous insufficiency, has puraply application 2 weeks ago, at 6 0'clock island of skin and also in center of wound noted\par \par \par 12/28/2022\par puraply number 2 applied today to patient’s left leg.   Wound bed debrided of bioburden and prepped for product application.  1 of pieces and original size of product) obtained.  Total product usage was 9 sq. cm), Total waste 6  due to wound size.  Product secured with (steri strips and bolster dressing).\par Patient to f/u in 1 week.\par s/p excisional debridement\par No clinical sign of infection\par The patient was positioned as follows .  The fluorescence imaging device was positioned 8-12 cm from the patient and the clinical darkness required for imaging was obtained.  The wound measured by [employer]..  The Xormis i:X fluorescence imaging device was used to report presence and location of red or cyan fluorescence, indicative of bacteria at loads greater than 104 CFU/g in real-time.  Images reported to be negative.  Due to presence of infection causing bacteria the wound was cleansed.e.\par 2/15/23\par puraply number 1 applied today to patient’s left leg.   Wound bed debrided of bioburden and prepped for product application.  1 of pieces and original size of product obtained.  Total product usage was 7.7 sq. cm), Total waste 2.3 sq. cm) due to wound size.  Product secured with (steri strips and bolster dressing).\par Patient to f/u in 1 week.\par \par \par \par \par \par \par \par \par \par

## 2023-02-20 ENCOUNTER — RX RENEWAL (OUTPATIENT)
Age: 78
End: 2023-02-20

## 2023-03-22 ENCOUNTER — APPOINTMENT (OUTPATIENT)
Dept: VASCULAR SURGERY | Facility: CLINIC | Age: 78
End: 2023-03-22
Payer: MEDICARE

## 2023-03-22 VITALS
WEIGHT: 210 LBS | SYSTOLIC BLOOD PRESSURE: 127 MMHG | HEART RATE: 112 BPM | TEMPERATURE: 99.1 F | HEIGHT: 64 IN | BODY MASS INDEX: 35.85 KG/M2 | DIASTOLIC BLOOD PRESSURE: 94 MMHG

## 2023-03-22 PROCEDURE — 93971 EXTREMITY STUDY: CPT | Mod: LT

## 2023-03-22 PROCEDURE — 99213 OFFICE O/P EST LOW 20 MIN: CPT

## 2023-03-22 PROCEDURE — 93922 UPR/L XTREMITY ART 2 LEVELS: CPT

## 2023-03-23 NOTE — HISTORY OF PRESENT ILLNESS
[FreeTextEntry1] : Ms. FLORINDA VALVERDE is a 77 year F who presents for follow-up evaluation of left leg pain for the past several months. \par Ms. VALVERDE leg discomfort is associated with leg swelling, fatigue, heaviness, achiness, restlessness, muscle cramping, itchiness, dry, flaky skin, and skin darkening at the ankles. \par She complains of painful varicose veins.\par Her symptoms have persisted despite conservative management with leg elevation, exercise, attempted weight loss, over-the-counter medications (ibuprofen), and compression stocking use for more than 3 months. \par Her symptoms are aggravated by weight bearing, prolonged standing, prolonged sitting, extended travel, exercise\par Nothing helps to alleviate patient's symptoms. \par Her symptoms interfere with the her ADLs such as shopping and housekeeping. \par Ms. VALVERDE risks factor for venous disease are obesity, history of varicose veins, spider veins \par Previous treatment, vein procedures and vein surgeries include: wearing compression stockings for more than 3 months with little or no relief and vein ablations\par \par

## 2023-03-23 NOTE — ASSESSMENT
[FreeTextEntry1] : Ms. FLORINDA VALVERDE is a 77 year with persistent and worsening left lower extremity venous insufficiency, CEAP classification C6 which is unresponsive to at least 3 months of compression stockings 20-30 mmHg, leg elevation, exercise and over the counter pain medication_(Ibuprofen). Patient has complaints of worsening left leg discomfort with swelling, fatigue, heaviness, achiness, cramping, restlessness, and painful varicosities. The patient’s symptoms interfere with their ADL’s, such as walking, shopping and house work, and their ability to work and exercise. Patient has intact pulses in both legs without evidence of arterial insufficiency. \par Treatment is indicated not for cosmetic reasons but for symptomatic venous reflux disease with symptoms which is refractory to conservative therapy. Venous duplex study demonstrates  left lower extremity venous insufficiency. The need for definitive effective treatment is based on severe, interfering and worsening reflux symptoms with evidence of venous insufficiency on venous ultrasound. \par Patient is a candidate for endovenous ablation with Venaseal treatment of the left SSV. \par The risks and benefits of endovenous ablation treatment versus continued conservative management were discussed with the patient. Patient chooses endovenous ablation treatments with Venaseal. Treatment plan to be scheduled.\par

## 2023-03-23 NOTE — PHYSICAL EXAM
[FreeTextEntry1] : LE vein findings: \par Varicosities (spider, reticular, varicose) left\par Edema left\par Skin changes dry, flaky skin, stasis dermatitis, hyperpigmentation in the gator area, lipodermatosclerosis, hyperkeratosis (skin thickening)\par Ulcer left lower leg\par CEAP classification C6\par Palpable DP pulses bilaterally\par \par

## 2023-05-01 RX ORDER — LIDOCAINE HYDROCHLORIDE 10 MG/ML
1 INJECTION, SOLUTION INFILTRATION; PERINEURAL
Qty: 50 | Refills: 0 | Status: ACTIVE | COMMUNITY
Start: 2023-05-01 | End: 1900-01-01

## 2023-05-01 RX ORDER — SODIUM BICARBONATE 84 MG/ML
8.4 INJECTION, SOLUTION INTRAVENOUS
Qty: 5 | Refills: 0 | Status: ACTIVE | COMMUNITY
Start: 2023-05-01 | End: 1900-01-01

## 2023-05-04 ENCOUNTER — APPOINTMENT (OUTPATIENT)
Dept: VASCULAR SURGERY | Facility: CLINIC | Age: 78
End: 2023-05-04
Payer: MEDICARE

## 2023-05-04 PROCEDURE — 36475 ENDOVENOUS RF 1ST VEIN: CPT | Mod: LT

## 2023-05-04 NOTE — PROCEDURE
[FreeTextEntry1] : left SSV RFA [FreeTextEntry3] : Procedural safety checklist and time out completed:\par Confirmed patient identification (Patient Name, , and/or medical record number including when possible affirmation by patient or parent/family/other).\par Confirmed procedure with the patient. Consent present, accurate and signed. \par Confirmed special equipment and supplies are present.\par Sterility confirmed. Position verified. \par Site/ side is marked and visible and confirmed. \par Procedure confirmed by consent. Accurate consent including side and site.\par Review of medical records, including venous ultrasound, noting correct procedure including site and side.\par MD/PA verifies presence and review of imaging studies and or written report of imaging studies.\par Agreement on the procedure to be performed\par Time out completed.\par All of the above has been confirmed by the team.\par All patient-specific concerns have been addressed. \par \par Indication: left lower extremity varicose veins with ulcer, inflammation, leg pain, leg swelling, and leg cramping.  Venous insufficiency/ reflux.\par \par Procedure: radiofrequency ablation of the left small saphenous vein. \par 	\par Ms. FLORINDA VALVERDE is a 77 year old F with a history of left lower extremity varicose veins previously seen in the office.  Ultrasound examination demonstrated venous insufficiency. A trial of compression stockings, exercise, elevation, and pain medication was attempted without relief and definitive treatment with radiofrequency ablation was offered. \par \par The patient has come for radiofrequency ablation treatment of the left small saphenous vein.\par I have discussed the risks of the procedure at length with the patient. The risks discussed were inclusive of but not limited to infection, irritation at the site of infiltration of local anesthesia, and also rare risk of deep venous thrombosis and pulmonary emboli. The patient agrees to proceed with the procedure. \par The patient was escorted into the procedure room and a time out called.\par The entire limb was prepped and draped in sterile fashion. The RF fiber was placed on the sterile field and connected by a sterile cable. Actuation, temperature, and impedance testing were performed to ensure that all components were connected and operating properly. \par The patient was placed on the procedure table and local anesthesia was instilled in the skin overlying the access site. Under ultrasound guidance, the vein was punctured with a micropuncture needle, using the anterior wall technique. A guide wire was now introduced through the needle, and the needle was then exchanged over the guide wire for a 7F sheath. The guide wire was removed and the RF probe was then placed into the left small saphenous vein through the sheath and position confirmed using ultrasound guidance. After the RF probe position was verified by ultrasound, tumescent anesthesia consisting of normal saline, 1% lidocaine with 8.4% sodium bicarbonate was infiltrated, under ultrasound guidance, precisely into the perivenous compartment along the entire length of the vein until a “halo” of fluid was noted around the vein. After RF probe position was again confirmed with ultrasound imaging, RF energy was applied. The probe was gradually and carefully withdrawn at a rate of 6.5cm/20seconds. \par \par 4 cycles of RF performed using the 7 cm probe\par Total treatment time was 80 seconds.\par The total volume injected was 450 cc\par Treatment length was 20 cm and \par The probe is >3.5 cm from the SPJ.\par \par Estimated Blood Loss: minimal\par Repeat ultrasound of the treated vein was performed confirming successful treatment. The catheter and sheath were withdrawn and hemostasis established with direct pressure. After assuring hemostasis, a sterile 4x4 was placed on the access site and an ACE compression wrap was applied. Patient tolerated procedure well. Patient was given post-procedure instructions and follow up appointment was scheduled.\par \par \par

## 2023-05-10 ENCOUNTER — APPOINTMENT (OUTPATIENT)
Dept: VASCULAR SURGERY | Facility: CLINIC | Age: 78
End: 2023-05-10
Payer: MEDICARE

## 2023-05-10 ENCOUNTER — APPOINTMENT (OUTPATIENT)
Dept: WOUND CARE | Facility: CLINIC | Age: 78
End: 2023-05-10

## 2023-05-10 VITALS — WEIGHT: 210 LBS | HEIGHT: 64 IN | BODY MASS INDEX: 35.85 KG/M2 | TEMPERATURE: 97.4 F

## 2023-05-10 VITALS — DIASTOLIC BLOOD PRESSURE: 75 MMHG | HEART RATE: 62 BPM | SYSTOLIC BLOOD PRESSURE: 124 MMHG

## 2023-05-10 VITALS — HEART RATE: 67 BPM | DIASTOLIC BLOOD PRESSURE: 81 MMHG | SYSTOLIC BLOOD PRESSURE: 130 MMHG

## 2023-05-10 PROCEDURE — 93971 EXTREMITY STUDY: CPT | Mod: LT

## 2023-05-10 PROCEDURE — 99212 OFFICE O/P EST SF 10 MIN: CPT

## 2023-05-10 NOTE — PHYSICAL EXAM
[2+] : left 2+ [Ankle Swelling (On Exam)] : present [Ankle Swelling Bilaterally] : bilaterally  [Varicose Veins Of Lower Extremities] : bilaterally [Ankle Swelling On The Right] : mild [] : bilaterally [Ankle Swelling On The Left] : moderate [Skin Ulcer] : ulcer [Alert] : alert [Oriented to Person] : oriented to person [Oriented to Place] : oriented to place [Oriented to Time] : oriented to time [Calm] : calm [de-identified] : NAD [de-identified] : NC/AT [de-identified] : unlabored breathing [FreeTextEntry1] : LE vein findings: \par Varicosities (spider, reticular, varicose) left\par Edema bilateral\par Skin changes dry, flaky skin, stasis dermatitis, hyperpigmentation in the gator area, lipodermatosclerosis, hyperkeratosis (skin thickening)\par Ulcer left lower leg\par CEAP classification C6\par Palpable DP pulses bilaterally\par \par  [de-identified] : in wheelchair [de-identified] : LLE ulcer x 2 [de-identified] : grossly intact [de-identified] : cooperative

## 2023-05-10 NOTE — HISTORY OF PRESENT ILLNESS
[FreeTextEntry1] : Ms. FLORINDA VALVERDE is a 77 year F who presents for follow-up evaluation of left leg pain for the past several months. \par Ms. VALVERDE leg discomfort is associated with leg swelling, fatigue, heaviness, achiness, restlessness, muscle cramping, itchiness, dry, flaky skin, and skin darkening at the ankles. \par She complains of painful varicose veins.\par Her symptoms have persisted despite conservative management with leg elevation, exercise, attempted weight loss, over-the-counter medications (ibuprofen), and compression stocking use for more than 3 months. \par Her symptoms are aggravated by weight bearing, prolonged standing, prolonged sitting, extended travel, exercise\par Nothing helps to alleviate patient's symptoms. \par Her symptoms interfere with the her ADLs such as shopping and housekeeping. \par Ms. VALVERDE risks factor for venous disease are obesity, history of varicose veins, spider veins \par Previous treatment, vein procedures and vein surgeries include: wearing compression stockings for more than 3 months with little or no relief and vein ablations\par \par  [de-identified] : 5/10/2023 - Pt here to get her LLE wrapped after ultrasound s/p left SSV RFA 5/4/23. No new complaints. Persistent LLE anterior shin and posterior calf wounds. Minimal clear yellow drainage. RLE swelling. Pt continues to get wound care 2x/week via home care. Pt is consistent with leg elevation and wearing compression stockings on RLE. Pt does not walk much.

## 2023-05-10 NOTE — DATA REVIEWED
[FreeTextEntry1] : L SSV reflux\par L SPJ 5.9mm\par \par 5/10/23 LLE venous doppler\par no acute dvt or svt\par left ssv ablated

## 2023-05-10 NOTE — ASSESSMENT
[Arterial/Venous Disease] : arterial/venous disease [Other: _____] : [unfilled] [FreeTextEntry1] : Impression - chronic venous insufficiency s/p left SSV RFA, LLE wounds\par \par Plan\par Conservative medical management - leg elevation, exercise regimen, weight loss, diet control, RLE 20-30 mm hg knee high compression stockings\par Continue wound care 2x/week\par Advised pt to keep follow up appointment with Dr. Chavez on 6/6/23

## 2023-06-06 ENCOUNTER — APPOINTMENT (OUTPATIENT)
Dept: VASCULAR SURGERY | Facility: CLINIC | Age: 78
End: 2023-06-06
Payer: MEDICARE

## 2023-06-06 ENCOUNTER — APPOINTMENT (OUTPATIENT)
Dept: INTERNAL MEDICINE | Facility: CLINIC | Age: 78
End: 2023-06-06
Payer: MEDICARE

## 2023-06-06 VITALS
BODY MASS INDEX: 35.85 KG/M2 | OXYGEN SATURATION: 93 % | HEART RATE: 68 BPM | WEIGHT: 210 LBS | DIASTOLIC BLOOD PRESSURE: 66 MMHG | SYSTOLIC BLOOD PRESSURE: 122 MMHG | TEMPERATURE: 98 F | HEIGHT: 64 IN

## 2023-06-06 VITALS
BODY MASS INDEX: 35.85 KG/M2 | TEMPERATURE: 97.5 F | HEIGHT: 64 IN | HEART RATE: 76 BPM | WEIGHT: 210 LBS | DIASTOLIC BLOOD PRESSURE: 83 MMHG | SYSTOLIC BLOOD PRESSURE: 141 MMHG

## 2023-06-06 DIAGNOSIS — Z86.79 PERSONAL HISTORY OF OTHER DISEASES OF THE CIRCULATORY SYSTEM: ICD-10-CM

## 2023-06-06 DIAGNOSIS — I10 ESSENTIAL (PRIMARY) HYPERTENSION: ICD-10-CM

## 2023-06-06 DIAGNOSIS — D64.9 ANEMIA, UNSPECIFIED: ICD-10-CM

## 2023-06-06 DIAGNOSIS — M25.50 PAIN IN UNSPECIFIED JOINT: ICD-10-CM

## 2023-06-06 PROCEDURE — 99214 OFFICE O/P EST MOD 30 MIN: CPT | Mod: 25

## 2023-06-06 PROCEDURE — 36415 COLL VENOUS BLD VENIPUNCTURE: CPT

## 2023-06-06 PROCEDURE — 99213 OFFICE O/P EST LOW 20 MIN: CPT

## 2023-06-06 RX ORDER — ALPRAZOLAM 0.25 MG/1
0.25 TABLET ORAL
Qty: 1 | Refills: 0 | Status: DISCONTINUED | COMMUNITY
Start: 2023-05-01 | End: 2023-06-06

## 2023-06-06 RX ORDER — ALPRAZOLAM 0.25 MG/1
0.25 TABLET ORAL
Qty: 1 | Refills: 0 | Status: DISCONTINUED | COMMUNITY
Start: 2023-04-26 | End: 2023-06-06

## 2023-06-06 NOTE — HISTORY OF PRESENT ILLNESS
[FreeTextEntry1] : follow up [de-identified] : follow up\par \par Rheumatoid arthritis\par Patient with chronic venous stasis ulcers\par wound care and visiting nurse twice a week\par Had an aide three times a week\par  who formerly provided meal and helped patient dress now very illl with advanced prostate cancer \par getting treatment no longer able to help\par Patine with Rheumatoid arthritis, and chronic edema whll chair bound

## 2023-06-06 NOTE — REVIEW OF SYSTEMS
[Lower Ext Edema] : lower extremity edema [Negative] : Respiratory [Chest Pain] : no chest pain [Orthopnea] : no orthopnea [FreeTextEntry5] : afib

## 2023-06-06 NOTE — PLAN
[FreeTextEntry1] : On chronic diuretic will get\par cbc, cmp, magnesium, uric\par check RF level\par continue med and wound care\par \par  unable to provide meals or dress patient because of his advance cancer\par Patient wheelchair bound from Rheumatid and venous insufficiency with stasis ulcers\par Blood pressure good\par afib rate controlled on eliques\par

## 2023-06-06 NOTE — HISTORY OF PRESENT ILLNESS
[FreeTextEntry1] : Ms. FLORINDA VALVERDE is a 77 year F who presents for follow-up evaluation of left leg pain for the past several months. \par Ms. VALVERDE leg discomfort is associated with leg swelling, fatigue, heaviness, achiness, restlessness, muscle cramping, itchiness, dry, flaky skin, and skin darkening at the ankles. \par She complains of painful varicose veins.\par Her symptoms have persisted despite conservative management with leg elevation, exercise, attempted weight loss, over-the-counter medications (ibuprofen), and compression stocking use for more than 3 months. \par Her symptoms are aggravated by weight bearing, prolonged standing, prolonged sitting, extended travel, exercise\par Nothing helps to alleviate patient's symptoms. \par Her symptoms interfere with the her ADLs such as shopping and housekeeping. \par Ms. VALVERDE risks factor for venous disease are obesity, history of varicose veins, spider veins \par Previous treatment, vein procedures and vein surgeries include: wearing compression stockings for more than 3 months with little or no relief and vein ablations\par \par  [de-identified] : s/p left SSV RFA\par doing well\par denies pain other than related to her pre-existing knee arthritis\par admits swelling has dramatically improved in her left leg\par denies any other issues

## 2023-06-06 NOTE — ASSESSMENT
[FreeTextEntry1] : Problem #1 chronic venous insufficiency\par left lower leg venous stasis ulcer\par - s/p left SSV RFA\par - edema has dramatically improved\par - recommend further evaluation and management by wound care center\par - follow up as needed

## 2023-06-06 NOTE — PHYSICAL EXAM
[Respiratory Effort] : normal respiratory effort [Normal Rate and Rhythm] : normal rate and rhythm [2+] : left 2+ [Ankle Swelling (On Exam)] : present [Ankle Swelling On The Left] : moderate [Varicose Veins Of Lower Extremities] : bilaterally [Ankle Swelling On The Right] : mild [] : of the left leg [Ankle Swelling Bilaterally] : severe [Abdomen Tenderness] : ~T ~M No abdominal tenderness [Skin Ulcer] : ulcer [Alert] : alert [Oriented to Person] : oriented to person [Oriented to Place] : oriented to place [Oriented to Time] : oriented to time [Calm] : calm [de-identified] : appears stated age [de-identified] : normocephalic, atraumatic [de-identified] : supple

## 2023-06-06 NOTE — PHYSICAL EXAM
[Normal] : no respiratory distress, lungs were clear to auscultation bilaterally and no accessory muscle use [de-identified] : afib at 78 [de-identified] : 2 plus edema left leg wound wraps

## 2023-06-07 LAB
ALBUMIN SERPL ELPH-MCNC: 3.9 G/DL
ALP BLD-CCNC: 102 U/L
ALT SERPL-CCNC: 7 U/L
ANION GAP SERPL CALC-SCNC: 13 MMOL/L
AST SERPL-CCNC: 12 U/L
BILIRUB SERPL-MCNC: 0.2 MG/DL
BUN SERPL-MCNC: 19 MG/DL
CALCIUM SERPL-MCNC: 9.6 MG/DL
CHLORIDE SERPL-SCNC: 105 MMOL/L
CHOLEST SERPL-MCNC: 185 MG/DL
CO2 SERPL-SCNC: 24 MMOL/L
CREAT SERPL-MCNC: 0.52 MG/DL
EGFR: 94 ML/MIN/1.73M2
ESTIMATED AVERAGE GLUCOSE: 134 MG/DL
FERRITIN SERPL-MCNC: 86 NG/ML
GLUCOSE SERPL-MCNC: 101 MG/DL
HBA1C MFR BLD HPLC: 6.3 %
HDLC SERPL-MCNC: 51 MG/DL
IRON SATN MFR SERPL: 13 %
IRON SERPL-MCNC: 44 UG/DL
LDLC SERPL CALC-MCNC: 94 MG/DL
MAGNESIUM SERPL-MCNC: 1.9 MG/DL
NONHDLC SERPL-MCNC: 134 MG/DL
NT-PROBNP SERPL-MCNC: 229 PG/ML
PHOSPHATE SERPL-MCNC: 3.3 MG/DL
POTASSIUM SERPL-SCNC: 3.7 MMOL/L
PROT SERPL-MCNC: 7.3 G/DL
RHEUMATOID FACT SER QL: 260 IU/ML
SODIUM SERPL-SCNC: 143 MMOL/L
T4 FREE SERPL-MCNC: 1.3 NG/DL
TIBC SERPL-MCNC: 351 UG/DL
TRIGL SERPL-MCNC: 201 MG/DL
TSH SERPL-ACNC: 2.11 UIU/ML
UIBC SERPL-MCNC: 306 UG/DL
URATE SERPL-MCNC: 7 MG/DL

## 2023-07-02 ENCOUNTER — RX RENEWAL (OUTPATIENT)
Age: 78
End: 2023-07-02

## 2023-07-05 ENCOUNTER — OUTPATIENT (OUTPATIENT)
Dept: OUTPATIENT SERVICES | Facility: HOSPITAL | Age: 78
LOS: 1 days | End: 2023-07-05
Payer: MEDICARE

## 2023-07-05 ENCOUNTER — APPOINTMENT (OUTPATIENT)
Dept: WOUND CARE | Facility: CLINIC | Age: 78
End: 2023-07-05
Payer: MEDICARE

## 2023-07-05 VITALS
SYSTOLIC BLOOD PRESSURE: 107 MMHG | BODY MASS INDEX: 35.85 KG/M2 | DIASTOLIC BLOOD PRESSURE: 81 MMHG | WEIGHT: 210 LBS | HEART RATE: 88 BPM | TEMPERATURE: 98.9 F | HEIGHT: 64 IN

## 2023-07-05 PROCEDURE — G0463: CPT

## 2023-07-05 PROCEDURE — 99213 OFFICE O/P EST LOW 20 MIN: CPT

## 2023-07-05 NOTE — ASSESSMENT
[FreeTextEntry1] : 75 yr old woman with left leg venous ulcer , afib, HTN lymphedema\par   follow up after ablation done 1/26/21. for lt leg\par  data complex - mod lab,xr reviewed\par risk- surgery mod on ac, tolerated debridment well, improved pain\par 9/22/21\par right leg was edematous last week so unna was applied, no wounds\par needs new compression garment\par left leg has been using ioplex\par s/p excisional debridement\par re-measured for compression garment\par 10/20/21\par Wound being treated with ioplex, s/p excisional debridement\par slight periwound maceration noted\par 11/10/21\par Left leg venous ulcer, wound decrease in size\par Patient has been using ioplex\par s/p excisional debridement\par periwound maceration \par /The patient was positioned to allow for optimization of imaging. The fluorescence imaging device was placed 8-12 cm from the patient and the clinical darkness required for imaging was obtained by a dark drape. The wound measured () on the () . The [employer] i:X fluorescence imaging device was used to report presence and location of cyan fluorescence, indicative of bacteria at loads greater than 104 CFU/g in real-time. Images reported to have cyan fluorescence. The wound was mechanically cleansed with Dakins 0.125% and underwent excisional debridement. Fluorescence images acquired after cleansing demonstrated reduction in bacteria.\par \par 1/14/22\par wound clean and pink, size has hit a plateau according to visiting nurse measurements\par has been using ioplex\par 4/6/22\par Apligraf number 2 applied today to patient’s left leg.   Wound bed debrided of bioburden and prepped for product application.  1 of pieces and original size of product) obtained.  Total product usage was 7.7 sq. cm), Total waste 36.3sq. cm) due to wound size.  Product secured with (steri strips and bolster dressing).\par Patient to f/u in 1 week.\par \par The patient was positioned to allow for optimization of imaging. The fluorescence imaging device was placed 8-12 cm from the patient and the clinical darkness required for imaging was obtained by a dark drape. The wound measured   ----- cm2 on the /left ---leg. The [employer] i:X fluorescence imaging device was used to report presence and location of red or cyan fluorescence, indicative of bacteria at loads greater than 104 CFU/g in real-time. \par s/p excisional debridement\par 5/16/22\par wound clean, using isabel, distal 1/3 of wound is starting to pull into center of wound\par 5/23/22\par apligraf number 3 applied today to patient’s left leg.   Wound bed debrided of bioburden and prepped for product application.  1 of pieces and original size of product) obtained.  Total product usage was 12 sq. cm), Total waste  32 sq. cm) due to wound size.  Product secured with (steri strips and bolster dressing).\par Patient to f/u in 1 week.\par The patient was positioned to allow for optimization of imaging. The fluorescence imaging device was placed 8-12 cm from the patient and the clinical darkness required for imaging was obtained by a dark drape. The wound measured   TA on the /left ---leg). The [employer] i:X fluorescence imaging device was used to report presence and location of red  fluorescence, indicative of bacteria at loads greater than 104 CFU/g in real-time. Images reported to have ---no/red/cyan----fluorescence. The wound was ----mechanically cleansed with vashe and/or underwent excisional debridement.  Fluorescence images acquired after cleansing demonstrated reduction in bacteria.\par 8/1/22\par USING PUMP\par WEARING COMPRESSION RIGHT LEG\par apligraf number () applied today to patient’s (anatomical site).   Wound bed debrided of bioburden and prepped for product application.  (# of pieces and original size of product) obtained.  Total product usage was (x sq. cm), Total waste (x sq. cm) due to wound size.  Product secured with (steri strips and bolster dressing).\par Patient to f/u in 1 week.\par \par 11/16/22\par puraply number 1 applied today to patient’s left leg.   Wound bed debrided of bioburden and prepped for product application.  1 of pieces and original size of product) obtained.  Total product usage was 9 sq. cm), Total waste 6  due to wound size.  Product secured with (steri strips and bolster dressing).\par Patient to f/u in 1 week.\par s/p excisional debridement\par \par 12/9/22\par right leg, s/p evacuation hematoma and underlying venous insufficiency, has puraply application 2 weeks ago, at 6 0'clock island of skin and also in center of wound noted\par \par \par 12/28/2022\par puraply number 2 applied today to patient’s left leg.   Wound bed debrided of bioburden and prepped for product application.  1 of pieces and original size of product) obtained.  Total product usage was 9 sq. cm), Total waste 6  due to wound size.  Product secured with (steri strips and bolster dressing).\par Patient to f/u in 1 week.\par s/p excisional debridement\par No clinical sign of infection\par The patient was positioned as follows .  The fluorescence imaging device was positioned 8-12 cm from the patient and the clinical darkness required for imaging was obtained.  The wound measured by Tissue [employer]..  The [employer] i:X fluorescence imaging device was used to report presence and location of red or cyan fluorescence, indicative of bacteria at loads greater than 104 CFU/g in real-time.  Images reported to be negative.  Due to presence of infection causing bacteria the wound was cleansed.e.\par 2/15/23\par puraply number 1 applied today to patient’s left leg.   Wound bed debrided of bioburden and prepped for product application.  1 of pieces and original size of product obtained.  Total product usage was 7.7 sq. cm), Total waste 2.3 sq. cm) due to wound size.  Product secured with (steri strips and bolster dressing).\par Patient to f/u in 1 week.\par \par 7/5/23\par Wound Assessment and Plan:\par The patient presents with a wound to the LLE. Patient is s/p vascular procedures. Patient dressing wounds with Ioplex.Swelling noted to bilateral extremities. \par On exam:\par LLE\par Two wounds present\par Wound beds are pink\par Periwound maceration\par s/p mechanical debridement\par RLE \par Leg swelling\par no open wounds\par blanchable erythema\par \par The patient was positioned to allow for optimization of imaging. The fluorescence imaging device was placed 8-12 cm from the patient and the clinical darkness required for imaging was obtained by a dark drape. The wound measured by TA . The [employer] i:X fluorescence imaging device was used to report presence and location of cyan/red fluorescence, indicative of bacteria at loads greater than 104 CFU/g in real-time. Images reported to have cyan/red fluorescence. The wound was mechanically cleansed with Vashe  and underwent mechanical debridement. Fluorescence images acquired after cleansing demonstrated reduction in bacteria.\par No clinical sign of infection\par \par Wash wound with  Dove skin sensitive soap and clean water \par LLE Applied Ioplex/Xtrasorb/Multilayer compression dressings\par RLE multilayer compression dressing to reduce swelling\par Change dressing  3 times per week.\par Leg elevation as tolerated\par Encouraged ambulation or exercise.\par Optimization of nutrition.\par Offloading to the wound site.\par Home care orders in place\par Follow up appointment scheduled for 2-3 weeks\par \par \par \par \par \par \par \par \par \par \par

## 2023-07-05 NOTE — PHYSICAL EXAM
[Normal Rate and Rhythm] : normal rate and rhythm [de-identified] : seated at table at home, good hygiene [de-identified] : camila [de-identified] : non labored [de-identified] : limited ambulation [de-identified] : awake, alert , conversant

## 2023-07-21 DIAGNOSIS — L97.929 NON-PRESSURE CHRONIC ULCER OF UNSPECIFIED PART OF LEFT LOWER LEG WITH UNSPECIFIED SEVERITY: ICD-10-CM

## 2023-07-21 DIAGNOSIS — I87.312 CHRONIC VENOUS HYPERTENSION (IDIOPATHIC) WITH ULCER OF LEFT LOWER EXTREMITY: ICD-10-CM

## 2023-07-21 DIAGNOSIS — S81.802A UNSPECIFIED OPEN WOUND, LEFT LOWER LEG, INITIAL ENCOUNTER: ICD-10-CM

## 2023-07-21 DIAGNOSIS — I83.893 VARICOSE VEINS OF BILATERAL LOWER EXTREMITIES WITH OTHER COMPLICATIONS: ICD-10-CM

## 2023-07-26 ENCOUNTER — APPOINTMENT (OUTPATIENT)
Dept: WOUND CARE | Facility: CLINIC | Age: 78
End: 2023-07-26

## 2023-07-31 DIAGNOSIS — R05.9 COUGH, UNSPECIFIED: ICD-10-CM

## 2023-09-18 ENCOUNTER — APPOINTMENT (OUTPATIENT)
Dept: WOUND CARE | Facility: CLINIC | Age: 78
End: 2023-09-18
Payer: MEDICARE

## 2023-09-18 ENCOUNTER — OUTPATIENT (OUTPATIENT)
Dept: OUTPATIENT SERVICES | Facility: HOSPITAL | Age: 78
LOS: 1 days | End: 2023-09-18
Payer: MEDICARE

## 2023-09-18 VITALS
SYSTOLIC BLOOD PRESSURE: 139 MMHG | RESPIRATION RATE: 18 BRPM | OXYGEN SATURATION: 94 % | DIASTOLIC BLOOD PRESSURE: 76 MMHG | TEMPERATURE: 97.3 F | HEART RATE: 94 BPM
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
## 2023-09-18 PROCEDURE — 11042 DBRDMT SUBQ TIS 1ST 20SQCM/<: CPT

## 2023-09-20 DIAGNOSIS — S81.802A UNSPECIFIED OPEN WOUND, LEFT LOWER LEG, INITIAL ENCOUNTER: ICD-10-CM

## 2023-09-20 DIAGNOSIS — I89.0 LYMPHEDEMA, NOT ELSEWHERE CLASSIFIED: ICD-10-CM

## 2023-09-24 ENCOUNTER — NON-APPOINTMENT (OUTPATIENT)
Age: 78
End: 2023-09-24

## 2023-09-25 ENCOUNTER — RX RENEWAL (OUTPATIENT)
Age: 78
End: 2023-09-25

## 2023-09-27 ENCOUNTER — RX RENEWAL (OUTPATIENT)
Age: 78
End: 2023-09-27

## 2023-10-09 ENCOUNTER — APPOINTMENT (OUTPATIENT)
Dept: WOUND CARE | Facility: HOSPITAL | Age: 78
End: 2023-10-09
Payer: MEDICARE

## 2023-10-09 PROCEDURE — 29581 APPL MULTLAYER CMPRN SYS LEG: CPT | Mod: LT

## 2023-10-30 ENCOUNTER — APPOINTMENT (OUTPATIENT)
Dept: WOUND CARE | Facility: HOSPITAL | Age: 78
End: 2023-10-30

## 2023-11-22 ENCOUNTER — APPOINTMENT (OUTPATIENT)
Dept: WOUND CARE | Facility: CLINIC | Age: 78
End: 2023-11-22
Payer: MEDICARE

## 2023-11-22 ENCOUNTER — OUTPATIENT (OUTPATIENT)
Dept: OUTPATIENT SERVICES | Facility: HOSPITAL | Age: 78
LOS: 1 days | End: 2023-11-22
Payer: MEDICARE

## 2023-11-22 VITALS
BODY MASS INDEX: 37.21 KG/M2 | RESPIRATION RATE: 18 BRPM | TEMPERATURE: 97.6 F | HEIGHT: 63 IN | HEART RATE: 99 BPM | WEIGHT: 210 LBS | DIASTOLIC BLOOD PRESSURE: 84 MMHG | OXYGEN SATURATION: 97 % | SYSTOLIC BLOOD PRESSURE: 130 MMHG

## 2023-11-22 PROCEDURE — 11042 DBRDMT SUBQ TIS 1ST 20SQCM/<: CPT

## 2023-11-22 RX ORDER — CEPHALEXIN 500 MG/1
500 CAPSULE ORAL 3 TIMES DAILY
Qty: 9 | Refills: 0 | Status: ACTIVE | COMMUNITY
Start: 2023-11-22 | End: 1900-01-01

## 2023-11-30 DIAGNOSIS — L97.929 NON-PRESSURE CHRONIC ULCER OF UNSPECIFIED PART OF LEFT LOWER LEG WITH UNSPECIFIED SEVERITY: ICD-10-CM
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
## 2023-11-30 DIAGNOSIS — I87.312 CHRONIC VENOUS HYPERTENSION (IDIOPATHIC) WITH ULCER OF LEFT LOWER EXTREMITY: ICD-10-CM

## 2023-11-30 DIAGNOSIS — S81.802A UNSPECIFIED OPEN WOUND, LEFT LOWER LEG, INITIAL ENCOUNTER: ICD-10-CM

## 2023-11-30 DIAGNOSIS — R60.0 LOCALIZED EDEMA: ICD-10-CM

## 2023-12-06 ENCOUNTER — RX RENEWAL (OUTPATIENT)
Age: 78
End: 2023-12-06

## 2023-12-06 RX ORDER — LOSARTAN POTASSIUM 50 MG/1
50 TABLET, FILM COATED ORAL
Qty: 90 | Refills: 3 | Status: ACTIVE | COMMUNITY
Start: 2022-01-06 | End: 1900-01-01

## 2023-12-13 ENCOUNTER — APPOINTMENT (OUTPATIENT)
Dept: WOUND CARE | Facility: HOSPITAL | Age: 78
End: 2023-12-13
Payer: MEDICARE

## 2023-12-13 ENCOUNTER — OUTPATIENT (OUTPATIENT)
Dept: OUTPATIENT SERVICES | Facility: HOSPITAL | Age: 78
LOS: 1 days | End: 2023-12-13
Payer: MEDICARE

## 2023-12-13 VITALS
TEMPERATURE: 96.7 F | DIASTOLIC BLOOD PRESSURE: 68 MMHG | RESPIRATION RATE: 18 BRPM | OXYGEN SATURATION: 94 % | HEART RATE: 85 BPM | SYSTOLIC BLOOD PRESSURE: 131 MMHG

## 2023-12-13 VITALS — WEIGHT: 210 LBS | BODY MASS INDEX: 37.21 KG/M2 | HEIGHT: 63 IN

## 2023-12-13 PROCEDURE — G0463: CPT

## 2023-12-13 PROCEDURE — 99213 OFFICE O/P EST LOW 20 MIN: CPT

## 2023-12-13 NOTE — PLAN
[FreeTextEntry1] : 9/18/23 Plan - shower wound, scrub misael wound skin well as this area positive on moleculight use pump daily, 45 minutes can c/w ioplex, will ask nurse to get acticoat, needs to wipe down leg and wound with vashe before applying products xtrasorb/dynaflex follow up 2-3 weeks  10/6/23 Plan: Moistened Ioplex with water to remove if it adheres to the wound bed. Plan - shower wound, scrub misael wound skin well  use pump daily, 45 minutes WAsh wound with Vashe, apply Zinc oxide to the misael wound Ioplex, xtrasorb/dynaflex follow up 2-3 weeks Nursing orders given. 11/22/23 Plan - orders for nurse c/w ioplex/super absorber/dynaflex follow up 2-3 weeks 12/13/23 Recommended  using compression garment on the RLE Continue compression garment on the LLE. Repeat vascular studies. Ioplex to LLE wounds. Compression wrap to LLE. The patient was positioned as follows .  The fluorescence imaging device was positioned 8-12 cm from the patient and the clinical darkness required for imaging was obtained.  The wound measured by Tissue [employer]..  The [employer] i:X fluorescence imaging device was used to report presence and location of red or cyan fluorescence, indicative of bacteria at loads greater than 104 CFU/g in real-time.  Images reported to be positive mechanical washed.  Due to presence of infection causing bacteria the wound was cleansed.  Fluorescence images acquired after cleansing reported no reduction in bacteria..  I then turned my attention to debridement, resulting in decrease in the fluorescence image. measured for compression grment vascular test to be scheduled

## 2023-12-13 NOTE — PHYSICAL EXAM
[Normal Rate and Rhythm] : normal rate and rhythm [Please See PDF for [employer]] : Please See PDF for [employer]. [de-identified] : seated at table at home, good hygiene [de-identified] : camila [de-identified] : non labored [de-identified] : limited ambulation [de-identified] : awake, alert , conversant

## 2023-12-13 NOTE — REASON FOR VISIT
[Follow-Up: _____] : a [unfilled] follow-up visit [Family Member] : family member [FreeTextEntry1] : left leg ulcer

## 2023-12-13 NOTE — ASSESSMENT
[FreeTextEntry1] : Assessment Chronic ulcer of other specified site (707.8) (L98.499) Chronic venous hypertension (idiopathic) with ulcer of left lower extremity (459.31) (I87.312,L97.929) Edema of both feet (782.3) (R60.0) 75 yr old woman with left leg venous ulcer , afib, HTN lymphedema  follow up after ablation done 1/26/21. for lt leg  data complex - mod lab,xr reviewed risk- surgery mod on ac, tolerated debridment well, improved pain 9/22/21 right leg was edematous last week so unna was applied, no wounds needs new compression garment left leg has been using ioplex s/p excisional debridement re-measured for compression garment 10/20/21 Wound being treated with ioplex, s/p excisional debridement slight periwound maceration noted 11/10/21 Left leg venous ulcer, wound decrease in size Patient has been using ioplex s/p excisional debridement periwound maceration /The patient was positioned to allow for optimization of imaging. The fluorescence imaging device was placed 8-12 cm from the patient and the clinical darkness required for imaging was obtained by a dark drape. The wound measured () on the (). The [employer] i:X fluorescence imaging device was used to report presence and location of cyan fluorescence, indicative of bacteria at loads greater than 104 CFU/g in real-time. Images reported to have cyan fluorescence. The wound was mechanically cleansed with Dakins 0.125% and underwent excisional debridement. Fluorescence images acquired after cleansing demonstrated reduction in bacteria.  1/14/22 wound clean and pink, size has hit a plateau according to visiting nurse measurements has been using ioplex 4/6/22 Apligraf number 2 applied today to patient's left leg. Wound bed debrided of bioburden and prepped for product application. 1 of pieces and original size of product) obtained. Total product usage was 7.7 sq. cm), Total waste 36.3sq. cm) due to wound size. Product secured with (steri strips and bolster dressing). Patient to f/u in 1 week.  The patient was positioned to allow for optimization of imaging. The fluorescence imaging device was placed 8-12 cm from the patient and the clinical darkness required for imaging was obtained by a dark drape. The wound measured ----- cm2 on the /left ---leg. The [employer] i:X fluorescence imaging device was used to report presence and location of red or cyan fluorescence, indicative of bacteria at loads greater than 104 CFU/g in real-time. s/p excisional debridement 5/16/22 wound clean, using isabel, distal 1/3 of wound is starting to pull into center of wound 5/23/22 apligraf number 3 applied today to patient's left leg. Wound bed debrided of bioburden and prepped for product application. 1 of pieces and original size of product) obtained. Total product usage was 12 sq. cm), Total waste 32 sq. cm) due to wound size. Product secured with (steri strips and bolster dressing). Patient to f/u in 1 week. The patient was positioned to allow for optimization of imaging. The fluorescence imaging device was placed 8-12 cm from the patient and the clinical darkness required for imaging was obtained by a dark drape. The wound measured TA on the /left ---leg). The [employer] i:X fluorescence imaging device was used to report presence and location of red fluorescence, indicative of bacteria at loads greater than 104 CFU/g in real-time. Images reported to have ---no/red/cyan----fluorescence. The wound was ----mechanically cleansed with vashe and/or underwent excisional debridement. Fluorescence images acquired after cleansing demonstrated reduction in bacteria. 8/1/22 USING PUMP WEARING COMPRESSION RIGHT LEG apligraf number () applied today to patient's (anatomical site). Wound bed debrided of bioburden and prepped for product application. (# of pieces and original size of product) obtained. Total product usage was (x sq. cm), Total waste (x sq. cm) due to wound size. Product secured with (steri strips and bolster dressing). Patient to f/u in 1 week.  11/16/22 puraply number 1 applied today to patient's left leg. Wound bed debrided of bioburden and prepped for product application. 1 of pieces and original size of product) obtained. Total product usage was 9 sq. cm), Total waste 6 due to wound size. Product secured with (steri strips and bolster dressing). Patient to f/u in 1 week. s/p excisional debridement  12/9/22 right leg, s/p evacuation hematoma and underlying venous insufficiency, has puraply application 2 weeks ago, at 6 0'clock island of skin and also in center of wound noted   12/28/2022 puraply number 2 applied today to patient's left leg. Wound bed debrided of bioburden and prepped for product application. 1 of pieces and original size of product) obtained. Total product usage was 9 sq. cm), Total waste 6 due to wound size. Product secured with (steri strips and bolster dressing). Patient to f/u in 1 week. s/p excisional debridement No clinical sign of infection The patient was positioned as follows. The fluorescence imaging device was positioned 8-12 cm from the patient and the clinical darkness required for imaging was obtained. The wound measured by Tissue [employer].. The [employer] i:X fluorescence imaging device was used to report presence and location of red or cyan fluorescence, indicative of bacteria at loads greater than 104 CFU/g in real-time. Images reported to be negative. Due to presence of infection causing bacteria the wound was cleansed.e. 2/15/23 puraply number 1 applied today to patient's left leg. Wound bed debrided of bioburden and prepped for product application. 1 of pieces and original size of product obtained. Total product usage was 7.7 sq. cm), Total waste 2.3 sq. cm) due to wound size. Product secured with (steri strips and bolster dressing). Patient to f/u in 1 week.  7/5/23 Wound Assessment and Plan: The patient presents with a wound to the LLE. Patient is s/p vascular procedures. Patient dressing wounds with Ioplex.Swelling noted to bilateral extremities. On exam: LLE Two wounds present Wound beds are pink Periwound maceration s/p mechanical debridement RLE Leg swelling no open wounds blanchable erythema  The patient was positioned to allow for optimization of imaging. The fluorescence imaging device was placed 8-12 cm from the patient and the clinical darkness required for imaging was obtained by a dark drape. The wound measured by TA. The [employer] i:X fluorescence imaging device was used to report presence and location of cyan/red fluorescence, indicative of bacteria at loads greater than 104 CFU/g in real-time. Images reported to have cyan/red fluorescence. The wound was mechanically cleansed with Vashe and underwent mechanical debridement. Fluorescence images acquired after cleansing demonstrated reduction in bacteria. No clinical sign of infection  Wash wound with Dove skin sensitive soap and clean water LLE Applied Ioplex/Xtrasorb/Multilayer compression dressings RLE multilayer compression dressing to reduce swelling Change dressing 3 times per week. Leg elevation as tolerated Encouraged ambulation or exercise. Optimization of nutrition. Offloading to the wound site. Home care orders in place Follow up appointment scheduled for 2-3 weeks 9/18/23 left calf 2 wounds, drainage less The patient was positioned to allow for optimization of imaging. The fluorescence imaging device was placed 8-12 cm from the patient and the clinical darkness required for imaging was obtained by a dark drape. The wound measured ----- cm2 on the /left ---leg/foot). The [employer] i:X fluorescence imaging device was used to report presence and location of red and cyan fluorescence, indicative of bacteria at loads greater than 104 CFU/g in real-time. Images reported to have ---no/red & cyan----fluorescence. The wound was ----mechanically cleansed with vashe and/or underwent excisional debridement. Fluorescence images acquired after cleansing demonstrated reduction in bacteria. s/p excisional debridement has been using ioplex not using pump consistently s/p Left RFA  10/9/23 Patient presents for follow up of LLE wounds accompanied by her family memeber. On exam: Wound with red wound bed Periwound slightly macerated s/p mechanical debridement Today applied Zinc to the periwound Aquacel/Superabsorber to the wound bed K2 multilayer dressing HHA to moistened Ioplex before removing to avoid bleeding from the wound bed.  11/22/23 left leg wound superficial, edges with crusting, s/p excisional debridement has been using ioplex, vashe  right leg slightly warm, edematous - will treat, denies fever, pain The patient was positioned to allow for optimization of imaging. The fluorescence imaging device was placed 8-12 cm from the patient and the clinical darkness required for imaging was obtained by a dark drape. The wound measured   ----- cm2 on the /left ---leg. The [employer] i:X fluorescence imaging device was used to report presence and location of cyan fluorescence, indicative of bacteria at loads greater than 104 CFU/g in real-time. Images reported to have ---no cyan----fluorescence. The wound was ----mechanically cleansed with Dakins 0.125%/Iodine/0.9% saline) and/or underwent excisional debridement.  Fluorescence images acquired after cleansing demonstrated reduction in bacteria.   12/13/23 The patient is seen for follow-up for  LLE  wounds. States she is wearing compression garment. The RLE is swollen- doesn't wear stocking on that side due to inability to put the garment on.

## 2023-12-19 DIAGNOSIS — L97.929 NON-PRESSURE CHRONIC ULCER OF UNSPECIFIED PART OF LEFT LOWER LEG WITH UNSPECIFIED SEVERITY: ICD-10-CM

## 2023-12-19 DIAGNOSIS — I83.893 VARICOSE VEINS OF BILATERAL LOWER EXTREMITIES WITH OTHER COMPLICATIONS: ICD-10-CM
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
## 2023-12-19 DIAGNOSIS — S81.802A UNSPECIFIED OPEN WOUND, LEFT LOWER LEG, INITIAL ENCOUNTER: ICD-10-CM
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
## 2023-12-19 DIAGNOSIS — I87.312 CHRONIC VENOUS HYPERTENSION (IDIOPATHIC) WITH ULCER OF LEFT LOWER EXTREMITY: ICD-10-CM

## 2024-01-09 ENCOUNTER — RX RENEWAL (OUTPATIENT)
Age: 79
End: 2024-01-09

## 2024-01-09 RX ORDER — METOPROLOL SUCCINATE 50 MG/1
50 TABLET, EXTENDED RELEASE ORAL
Qty: 180 | Refills: 3 | Status: ACTIVE | COMMUNITY
Start: 2019-02-05 | End: 1900-01-01

## 2024-01-10 ENCOUNTER — APPOINTMENT (OUTPATIENT)
Dept: WOUND CARE | Facility: HOSPITAL | Age: 79
End: 2024-01-10
Payer: MEDICARE

## 2024-01-10 ENCOUNTER — OUTPATIENT (OUTPATIENT)
Dept: OUTPATIENT SERVICES | Facility: HOSPITAL | Age: 79
LOS: 1 days | End: 2024-01-10
Payer: MEDICARE

## 2024-01-10 ENCOUNTER — OUTPATIENT (OUTPATIENT)
Dept: OUTPATIENT SERVICES | Facility: HOSPITAL | Age: 79
LOS: 1 days | End: 2024-01-10

## 2024-01-10 VITALS
RESPIRATION RATE: 18 BRPM | SYSTOLIC BLOOD PRESSURE: 130 MMHG | HEART RATE: 78 BPM | DIASTOLIC BLOOD PRESSURE: 81 MMHG | TEMPERATURE: 97.3 F

## 2024-01-10 PROCEDURE — 93978 VASCULAR STUDY: CPT | Mod: 26

## 2024-01-10 PROCEDURE — 15271 SKIN SUB GRAFT TRNK/ARM/LEG: CPT

## 2024-01-10 PROCEDURE — 93970 EXTREMITY STUDY: CPT | Mod: 26

## 2024-01-10 NOTE — PHYSICAL EXAM
[Normal Rate and Rhythm] : normal rate and rhythm [Please See PDF for [employer]] : Please See PDF for [employer]. [de-identified] : seated at table at home, good hygiene [de-identified] : camila [de-identified] : non labored [de-identified] : limited ambulation [de-identified] : awake, alert , conversant

## 2024-01-10 NOTE — ASSESSMENT
[FreeTextEntry1] : Assessment Chronic ulcer of other specified site (707.8) (L98.499) Chronic venous hypertension (idiopathic) with ulcer of left lower extremity (459.31) (I87.312,L97.929) Edema of both feet (782.3) (R60.0) 75 yr old woman with left leg venous ulcer , afib, HTN lymphedema  follow up after ablation done 1/26/21. for lt leg  data complex - mod lab,xr reviewed risk- surgery mod on ac, tolerated debridment well, improved pain 9/22/21 right leg was edematous last week so unna was applied, no wounds needs new compression garment left leg has been using ioplex s/p excisional debridement re-measured for compression garment 10/20/21 Wound being treated with ioplex, s/p excisional debridement slight periwound maceration noted 11/10/21 Left leg venous ulcer, wound decrease in size Patient has been using ioplex s/p excisional debridement periwound maceration /The patient was positioned to allow for optimization of imaging. The fluorescence imaging device was placed 8-12 cm from the patient and the clinical darkness required for imaging was obtained by a dark drape. The wound measured () on the (). The [employer] i:X fluorescence imaging device was used to report presence and location of cyan fluorescence, indicative of bacteria at loads greater than 104 CFU/g in real-time. Images reported to have cyan fluorescence. The wound was mechanically cleansed with Dakins 0.125% and underwent excisional debridement. Fluorescence images acquired after cleansing demonstrated reduction in bacteria.  1/14/22 wound clean and pink, size has hit a plateau according to visiting nurse measurements has been using ioplex 4/6/22 Apligraf number 2 applied today to patient's left leg. Wound bed debrided of bioburden and prepped for product application. 1 of pieces and original size of product) obtained. Total product usage was 7.7 sq. cm), Total waste 36.3sq. cm) due to wound size. Product secured with (steri strips and bolster dressing). Patient to f/u in 1 week.  The patient was positioned to allow for optimization of imaging. The fluorescence imaging device was placed 8-12 cm from the patient and the clinical darkness required for imaging was obtained by a dark drape. The wound measured ----- cm2 on the /left ---leg. The [employer] i:X fluorescence imaging device was used to report presence and location of red or cyan fluorescence, indicative of bacteria at loads greater than 104 CFU/g in real-time. s/p excisional debridement 5/16/22 wound clean, using isabel, distal 1/3 of wound is starting to pull into center of wound 5/23/22 apligraf number 3 applied today to patient's left leg. Wound bed debrided of bioburden and prepped for product application. 1 of pieces and original size of product) obtained. Total product usage was 12 sq. cm), Total waste 32 sq. cm) due to wound size. Product secured with (steri strips and bolster dressing). Patient to f/u in 1 week. The patient was positioned to allow for optimization of imaging. The fluorescence imaging device was placed 8-12 cm from the patient and the clinical darkness required for imaging was obtained by a dark drape. The wound measured TA on the /left ---leg). The [employer] i:X fluorescence imaging device was used to report presence and location of red fluorescence, indicative of bacteria at loads greater than 104 CFU/g in real-time. Images reported to have ---no/red/cyan----fluorescence. The wound was ----mechanically cleansed with vashe and/or underwent excisional debridement. Fluorescence images acquired after cleansing demonstrated reduction in bacteria. 8/1/22 USING PUMP WEARING COMPRESSION RIGHT LEG apligraf number () applied today to patient's (anatomical site). Wound bed debrided of bioburden and prepped for product application. (# of pieces and original size of product) obtained. Total product usage was (x sq. cm), Total waste (x sq. cm) due to wound size. Product secured with (steri strips and bolster dressing). Patient to f/u in 1 week.  11/16/22 puraply number 1 applied today to patient's left leg. Wound bed debrided of bioburden and prepped for product application. 1 of pieces and original size of product) obtained. Total product usage was 9 sq. cm), Total waste 6 due to wound size. Product secured with (steri strips and bolster dressing). Patient to f/u in 1 week. s/p excisional debridement  12/9/22 right leg, s/p evacuation hematoma and underlying venous insufficiency, has puraply application 2 weeks ago, at 6 0'clock island of skin and also in center of wound noted   12/28/2022 puraply number 2 applied today to patient's left leg. Wound bed debrided of bioburden and prepped for product application. 1 of pieces and original size of product) obtained. Total product usage was 9 sq. cm), Total waste 6 due to wound size. Product secured with (steri strips and bolster dressing). Patient to f/u in 1 week. s/p excisional debridement No clinical sign of infection The patient was positioned as follows. The fluorescence imaging device was positioned 8-12 cm from the patient and the clinical darkness required for imaging was obtained. The wound measured by [facility] The [employer] i:X fluorescence imaging device was used to report presence and location of red or cyan fluorescence, indicative of bacteria at loads greater than 104 CFU/g in real-time. Images reported to be negative. Due to presence of infection causing bacteria the wound was cleansed.e. 2/15/23 puraply number 1 applied today to patient's left leg. Wound bed debrided of bioburden and prepped for product application. 1 of pieces and original size of product obtained. Total product usage was 7.7 sq. cm), Total waste 2.3 sq. cm) due to wound size. Product secured with (steri strips and bolster dressing). Patient to f/u in 1 week.  7/5/23 Wound Assessment and Plan: The patient presents with a wound to the LLE. Patient is s/p vascular procedures. Patient dressing wounds with Ioplex.Swelling noted to bilateral extremities. On exam: LLE Two wounds present Wound beds are pink Periwound maceration s/p mechanical debridement RLE Leg swelling no open wounds blanchable erythema  The patient was positioned to allow for optimization of imaging. The fluorescence imaging device was placed 8-12 cm from the patient and the clinical darkness required for imaging was obtained by a dark drape. The wound measured by TA. The [employer] i:X fluorescence imaging device was used to report presence and location of cyan/red fluorescence, indicative of bacteria at loads greater than 104 CFU/g in real-time. Images reported to have cyan/red fluorescence. The wound was mechanically cleansed with Vashe and underwent mechanical debridement. Fluorescence images acquired after cleansing demonstrated reduction in bacteria. No clinical sign of infection  Wash wound with Dove skin sensitive soap and clean water LLE Applied Ioplex/Xtrasorb/Multilayer compression dressings RLE multilayer compression dressing to reduce swelling Change dressing 3 times per week. Leg elevation as tolerated Encouraged ambulation or exercise. Optimization of nutrition. Offloading to the wound site. Home care orders in place Follow up appointment scheduled for 2-3 weeks 9/18/23 left calf 2 wounds, drainage less The patient was positioned to allow for optimization of imaging. The fluorescence imaging device was placed 8-12 cm from the patient and the clinical darkness required for imaging was obtained by a dark drape. The wound measured ----- cm2 on the /left ---leg/foot). The [employer] i:X fluorescence imaging device was used to report presence and location of red and cyan fluorescence, indicative of bacteria at loads greater than 104 CFU/g in real-time. Images reported to have ---no/red & cyan----fluorescence. The wound was ----mechanically cleansed with vashe and/or underwent excisional debridement. Fluorescence images acquired after cleansing demonstrated reduction in bacteria. s/p excisional debridement has been using ioplex not using pump consistently s/p Left RFA  10/9/23 Patient presents for follow up of LLE wounds accompanied by her family memeber. On exam: Wound with red wound bed Periwound slightly macerated s/p mechanical debridement Today applied Zinc to the periwound Aquacel/Superabsorber to the wound bed K2 multilayer dressing HHA to moistened Ioplex before removing to avoid bleeding from the wound bed.  11/22/23 left leg wound superficial, edges with crusting, s/p excisional debridement has been using ioplex, vashe  right leg slightly warm, edematous - will treat, denies fever, pain The patient was positioned to allow for optimization of imaging. The fluorescence imaging device was placed 8-12 cm from the patient and the clinical darkness required for imaging was obtained by a dark drape. The wound measured   ----- cm2 on the /left ---leg. The [employer] i:X fluorescence imaging device was used to report presence and location of cyan fluorescence, indicative of bacteria at loads greater than 104 CFU/g in real-time. Images reported to have ---no cyan----fluorescence. The wound was ----mechanically cleansed with Dakins 0.125%/Iodine/0.9% saline) and/or underwent excisional debridement.  Fluorescence images acquired after cleansing demonstrated reduction in bacteria.   12/13/23 The patient is seen for follow-up for  LLE  wounds. States she is wearing compression garment. The RLE is swollen- doesn't wear stocking on that side due to inability to put the garment on.  1/10/24    Patient presents with her son for follow up of LLE wound. Patient wearing compression on the RLE and wrapped on the LLE. Patient had US testing performed today on b/l lower extremities. Results of testing discussed with patient and son.  On exam: Wounds have red granulation tissue, layer of yellow slough, wound edges macerated, periwound skin intact. s/p excisional debridement The patient was positioned to allow for optimization of imaging. The fluorescence imaging device was placed 8-12 cm from the patient and the clinical darkness required for imaging was obtained by a dark drape. The wound measured TA. The [employer] i:X fluorescence imaging device was used to report presence and location of cyan fluorescence, indicative of bacteria at loads greater than 104 CFU/g in real-time. Images reported to have cyan fluorescence. The wound was mechanically cleansed with Vashe and underwent excisional debridement. Fluorescence images acquired after cleansing demonstrated reduction in bacteria.  Affinity #1 applied today to patients LLE Wounds bed debrided of bioburden and prepped for product application. (1 pieces cut in two and 7 square cm) obtained. Total product usage was (2.96 sq. cm), Total waste (4.04 sq cm) due to wound size. Product secured with (sterile strips and bolster dressing). Maintain wound veil in place until next visit. Keep area dry. Patient has homecare. Has been dressing wound with Ioplex/Aquacel/multilayer dressing. Has a Lymphedema pump , not using daily. Patient to f/u in 2 weeks.

## 2024-01-10 NOTE — PLAN
[FreeTextEntry1] : 9/18/23 Plan - shower wound, scrub misael wound skin well as this area positive on moleculight use pump daily, 45 minutes can c/w ioplex, will ask nurse to get acticoat, needs to wipe down leg and wound with vashe before applying products xtrasorb/dynaflex follow up 2-3 weeks  10/6/23 Plan: Moistened Ioplex with water to remove if it adheres to the wound bed. Plan - shower wound, scrub misael wound skin well  use pump daily, 45 minutes WAsh wound with Vashe, apply Zinc oxide to the misael wound Ioplex, xtrasorb/dynaflex follow up 2-3 weeks Nursing orders given. 11/22/23 Plan - orders for nurse c/w ioplex/super absorber/dynaflex follow up 2-3 weeks 12/13/23 Recommended  using compression garment on the RLE Continue compression garment on the LLE. Repeat vascular studies. Ioplex to LLE wounds. Compression wrap to LLE. The patient was positioned as follows .  The fluorescence imaging device was positioned 8-12 cm from the patient and the clinical darkness required for imaging was obtained.  The wound measured by Tissue [employer]  The [employer] i:X fluorescence imaging device was used to report presence and location of red or cyan fluorescence, indicative of bacteria at loads greater than 104 CFU/g in real-time.  Images reported to be positive mechanical washed.  Due to presence of infection causing bacteria the wound was cleansed.  Fluorescence images acquired after cleansing reported no reduction in bacteria..  I then turned my attention to debridement, resulting in decrease in the fluorescence image. measured for compression grment vascular test to be scheduled  1/10/24 Plan: Maintain wound veil in place. Do not remove wound veil Keep area dry Nursing orders given Patient measured for Circaids on the next visit. RTO in two weeks

## 2024-01-16 DIAGNOSIS — I87.312 CHRONIC VENOUS HYPERTENSION (IDIOPATHIC) WITH ULCER OF LEFT LOWER EXTREMITY: ICD-10-CM

## 2024-01-16 DIAGNOSIS — S81.802A UNSPECIFIED OPEN WOUND, LEFT LOWER LEG, INITIAL ENCOUNTER: ICD-10-CM
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
## 2024-01-16 DIAGNOSIS — I83.893 VARICOSE VEINS OF BILATERAL LOWER EXTREMITIES WITH OTHER COMPLICATIONS: ICD-10-CM

## 2024-01-16 DIAGNOSIS — L97.929 NON-PRESSURE CHRONIC ULCER OF UNSPECIFIED PART OF LEFT LOWER LEG WITH UNSPECIFIED SEVERITY: ICD-10-CM
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
## 2024-01-18 DIAGNOSIS — R60.0 LOCALIZED EDEMA: ICD-10-CM

## 2024-01-18 DIAGNOSIS — I83.893 VARICOSE VEINS OF BILATERAL LOWER EXTREMITIES WITH OTHER COMPLICATIONS: ICD-10-CM

## 2024-01-23 ENCOUNTER — APPOINTMENT (OUTPATIENT)
Dept: INTERNAL MEDICINE | Facility: CLINIC | Age: 79
End: 2024-01-23
Payer: MEDICARE

## 2024-01-23 VITALS
DIASTOLIC BLOOD PRESSURE: 71 MMHG | HEART RATE: 73 BPM | SYSTOLIC BLOOD PRESSURE: 112 MMHG | TEMPERATURE: 97.4 F | HEIGHT: 63 IN | WEIGHT: 200 LBS | OXYGEN SATURATION: 95 % | BODY MASS INDEX: 35.44 KG/M2

## 2024-01-23 DIAGNOSIS — M10.9 GOUT, UNSPECIFIED: ICD-10-CM

## 2024-01-23 DIAGNOSIS — L98.499 NON-PRESSURE CHRONIC ULCER OF SKIN OF OTHER SITES WITH UNSPECIFIED SEVERITY: ICD-10-CM

## 2024-01-23 DIAGNOSIS — I48.91 UNSPECIFIED ATRIAL FIBRILLATION: ICD-10-CM

## 2024-01-23 DIAGNOSIS — Z00.00 ENCOUNTER FOR GENERAL ADULT MEDICAL EXAMINATION W/OUT ABNORMAL FINDINGS: ICD-10-CM

## 2024-01-23 PROCEDURE — G0439: CPT

## 2024-01-23 PROCEDURE — 36415 COLL VENOUS BLD VENIPUNCTURE: CPT

## 2024-01-23 RX ORDER — AMOXICILLIN AND CLAVULANATE POTASSIUM 500; 125 MG/1; MG/1
500-125 TABLET, FILM COATED ORAL
Qty: 14 | Refills: 1 | Status: DISCONTINUED | COMMUNITY
Start: 2023-07-31 | End: 2024-01-23

## 2024-01-23 RX ORDER — FLUTICASONE FUROATE AND VILANTEROL TRIFENATATE 100; 25 UG/1; UG/1
100-25 POWDER RESPIRATORY (INHALATION) DAILY
Qty: 1 | Refills: 0 | Status: DISCONTINUED | COMMUNITY
Start: 2023-07-31 | End: 2024-01-23

## 2024-01-23 NOTE — HEALTH RISK ASSESSMENT
[Fair] :  ~his/her~ mood as fair [No] : No [Never (0 pts)] : Never (0 points) [No falls in past year] : Patient reported no falls in the past year [1] : 2) Feeling down, depressed, or hopeless for several days (1) [PHQ-2 Negative - No further assessment needed] : PHQ-2 Negative - No further assessment needed [[id number]] : 2 [Change in mental status noted] : No change in mental status noted [Language] : denies difficulty with language [Behavior] : denies difficulty with behavior [Learning/Retaining New Information] : denies difficulty learning/retaining new information [Handling Complex Tasks] : denies difficulty handling complex tasks [Reasoning] : denies difficulty with reasoning [Spatial Ability and Orientation] : denies difficulty with spatial ability and orientation [None] : None [Alone] : lives alone [High School] : high school [] :  [Feels Safe at Home] : Feels safe at home [Independent] : using telephone [Some assistance needed] : managing finances [Full assistance needed] : using transportation [Reports changes in hearing] : Reports no changes in hearing [Reports changes in vision] : Reports no changes in vision [Reports changes in dental health] : Reports no changes in dental health [Smoke Detector] : smoke detector [Carbon Monoxide Detector] : carbon monoxide detector [Guns at Home] : no guns at home [Seat Belt] :  uses seat belt [Never] : Never

## 2024-01-23 NOTE — HISTORY OF PRESENT ILLNESS
[FreeTextEntry1] : Annual [de-identified] : Annual Had flu shot RA on low dose prednisone chronic leg wound under care vsiiting nuirse 3 x week afib on metopriolol and ki venous staissi on lasix urinary leakage no sob lost  10 days ago

## 2024-01-23 NOTE — PHYSICAL EXAM
[Normal] : no respiratory distress, lungs were clear to auscultation bilaterally and no accessory muscle use [Normal Rate] : normal rate  [Regular Rhythm] : with a regular rhythm [de-identified] : not in afib   NSR [de-identified] : edma  wrapped

## 2024-01-23 NOTE — REVIEW OF SYSTEMS
[Palpitations] : palpitations [Joint Pain] : joint pain [Muscle Weakness] : muscle weakness [Negative] : Respiratory [de-identified] : venous staisi ulcers

## 2024-01-24 ENCOUNTER — APPOINTMENT (OUTPATIENT)
Dept: WOUND CARE | Facility: HOSPITAL | Age: 79
End: 2024-01-24
Payer: MEDICARE

## 2024-01-24 ENCOUNTER — OUTPATIENT (OUTPATIENT)
Dept: OUTPATIENT SERVICES | Facility: HOSPITAL | Age: 79
LOS: 1 days | End: 2024-01-24
Payer: MEDICARE

## 2024-01-24 ENCOUNTER — APPOINTMENT (OUTPATIENT)
Dept: WOUND CARE | Facility: HOSPITAL | Age: 79
End: 2024-01-24

## 2024-01-24 DIAGNOSIS — S80.12XD CONTUSION OF LEFT LOWER LEG, SUBSEQUENT ENCOUNTER: ICD-10-CM

## 2024-01-24 LAB
ALBUMIN SERPL ELPH-MCNC: 3.9 G/DL
ALP BLD-CCNC: 85 U/L
ALT SERPL-CCNC: 8 U/L
ANION GAP SERPL CALC-SCNC: 11 MMOL/L
AST SERPL-CCNC: 10 U/L
BILIRUB SERPL-MCNC: 0.3 MG/DL
BUN SERPL-MCNC: 24 MG/DL
CALCIUM SERPL-MCNC: 9.4 MG/DL
CHLORIDE SERPL-SCNC: 102 MMOL/L
CHOLEST SERPL-MCNC: 174 MG/DL
CO2 SERPL-SCNC: 26 MMOL/L
CREAT SERPL-MCNC: 0.58 MG/DL
EGFR: 91 ML/MIN/1.73M2
ESTIMATED AVERAGE GLUCOSE: 123 MG/DL
GLUCOSE SERPL-MCNC: 140 MG/DL
HBA1C MFR BLD HPLC: 5.9 %
HCT VFR BLD CALC: 39.2 %
HDLC SERPL-MCNC: 51 MG/DL
HGB BLD-MCNC: 11.9 G/DL
LDLC SERPL CALC-MCNC: 91 MG/DL
MCHC RBC-ENTMCNC: 29 PG
MCHC RBC-ENTMCNC: 30.4 GM/DL
MCV RBC AUTO: 95.4 FL
NONHDLC SERPL-MCNC: 123 MG/DL
PLATELET # BLD AUTO: 292 K/UL
POTASSIUM SERPL-SCNC: 3.8 MMOL/L
PROT SERPL-MCNC: 7.1 G/DL
RBC # BLD: 4.11 M/UL
RBC # FLD: 14.3 %
SODIUM SERPL-SCNC: 139 MMOL/L
T4 FREE SERPL-MCNC: 1.2 NG/DL
TRIGL SERPL-MCNC: 188 MG/DL
TSH SERPL-ACNC: 2.56 UIU/ML
URATE SERPL-MCNC: 7.2 MG/DL
WBC # FLD AUTO: 7.36 K/UL

## 2024-01-24 PROCEDURE — 99213 OFFICE O/P EST LOW 20 MIN: CPT

## 2024-01-24 PROCEDURE — G0463: CPT

## 2024-01-31 ENCOUNTER — OUTPATIENT (OUTPATIENT)
Dept: OUTPATIENT SERVICES | Facility: HOSPITAL | Age: 79
LOS: 1 days | End: 2024-01-31
Payer: MEDICARE

## 2024-01-31 ENCOUNTER — APPOINTMENT (OUTPATIENT)
Dept: WOUND CARE | Facility: HOSPITAL | Age: 79
End: 2024-01-31
Payer: MEDICARE

## 2024-01-31 PROCEDURE — 11042 DBRDMT SUBQ TIS 1ST 20SQCM/<: CPT

## 2024-01-31 PROCEDURE — G0463: CPT

## 2024-01-31 NOTE — ASSESSMENT
[FreeTextEntry1] : Assessment Chronic ulcer of other specified site (707.8) (L98.499) Chronic venous hypertension (idiopathic) with ulcer of left lower extremity (459.31) (I87.312,L97.929) Edema of both feet (782.3) (R60.0) 75 yr old woman with left leg venous ulcer , afib, HTN lymphedema  follow up after ablation done 1/26/21. for lt leg  data complex - mod lab,xr reviewed risk- surgery mod on ac, tolerated debridment well, improved pain 9/22/21 right leg was edematous last week so unna was applied, no wounds needs new compression garment left leg has been using ioplex s/p excisional debridement re-measured for compression garment 10/20/21 Wound being treated with ioplex, s/p excisional debridement slight periwound maceration noted 11/10/21 Left leg venous ulcer, wound decrease in size Patient has been using ioplex s/p excisional debridement periwound maceration /The patient was positioned to allow for optimization of imaging. The fluorescence imaging device was placed 8-12 cm from the patient and the clinical darkness required for imaging was obtained by a dark drape. The wound measured () on the (). The [employer] i:X fluorescence imaging device was used to report presence and location of cyan fluorescence, indicative of bacteria at loads greater than 104 CFU/g in real-time. Images reported to have cyan fluorescence. The wound was mechanically cleansed with Dakins 0.125% and underwent excisional debridement. Fluorescence images acquired after cleansing demonstrated reduction in bacteria.  1/14/22 wound clean and pink, size has hit a plateau according to visiting nurse measurements has been using ioplex 4/6/22 Apligraf number 2 applied today to patient's left leg. Wound bed debrided of bioburden and prepped for product application. 1 of pieces and original size of product) obtained. Total product usage was 7.7 sq. cm), Total waste 36.3sq. cm) due to wound size. Product secured with (steri strips and bolster dressing). Patient to f/u in 1 week.  The patient was positioned to allow for optimization of imaging. The fluorescence imaging device was placed 8-12 cm from the patient and the clinical darkness required for imaging was obtained by a dark drape. The wound measured ----- cm2 on the /left ---leg. The [employer] i:X fluorescence imaging device was used to report presence and location of red or cyan fluorescence, indicative of bacteria at loads greater than 104 CFU/g in real-time. s/p excisional debridement 5/16/22 wound clean, using isabel, distal 1/3 of wound is starting to pull into center of wound 5/23/22 apligraf number 3 applied today to patient's left leg. Wound bed debrided of bioburden and prepped for product application. 1 of pieces and original size of product) obtained. Total product usage was 12 sq. cm), Total waste 32 sq. cm) due to wound size. Product secured with (steri strips and bolster dressing). Patient to f/u in 1 week. The patient was positioned to allow for optimization of imaging. The fluorescence imaging device was placed 8-12 cm from the patient and the clinical darkness required for imaging was obtained by a dark drape. The wound measured TA on the /left ---leg). The [employer] i:X fluorescence imaging device was used to report presence and location of red fluorescence, indicative of bacteria at loads greater than 104 CFU/g in real-time. Images reported to have ---no/red/cyan----fluorescence. The wound was ----mechanically cleansed with vashe and/or underwent excisional debridement. Fluorescence images acquired after cleansing demonstrated reduction in bacteria. 8/1/22 USING PUMP WEARING COMPRESSION RIGHT LEG apligraf number () applied today to patient's (anatomical site). Wound bed debrided of bioburden and prepped for product application. (# of pieces and original size of product) obtained. Total product usage was (x sq. cm), Total waste (x sq. cm) due to wound size. Product secured with (steri strips and bolster dressing). Patient to f/u in 1 week.  11/16/22 puraply number 1 applied today to patient's left leg. Wound bed debrided of bioburden and prepped for product application. 1 of pieces and original size of product) obtained. Total product usage was 9 sq. cm), Total waste 6 due to wound size. Product secured with (steri strips and bolster dressing). Patient to f/u in 1 week. s/p excisional debridement  12/9/22 right leg, s/p evacuation hematoma and underlying venous insufficiency, has puraply application 2 weeks ago, at 6 0'clock island of skin and also in center of wound noted   12/28/2022 puraply number 2 applied today to patient's left leg. Wound bed debrided of bioburden and prepped for product application. 1 of pieces and original size of product) obtained. Total product usage was 9 sq. cm), Total waste 6 due to wound size. Product secured with (steri strips and bolster dressing). Patient to f/u in 1 week. s/p excisional debridement No clinical sign of infection The patient was positioned as follows. The fluorescence imaging device was positioned 8-12 cm from the patient and the clinical darkness required for imaging was obtained. The wound measured by Tissue [employer] The [employer] i:X fluorescence imaging device was used to report presence and location of red or cyan fluorescence, indicative of bacteria at loads greater than 104 CFU/g in real-time. Images reported to be negative. Due to presence of infection causing bacteria the wound was cleansed.e. 2/15/23 puraply number 1 applied today to patient's left leg. Wound bed debrided of bioburden and prepped for product application. 1 of pieces and original size of product obtained. Total product usage was 7.7 sq. cm), Total waste 2.3 sq. cm) due to wound size. Product secured with (steri strips and bolster dressing). Patient to f/u in 1 week.  7/5/23 Wound Assessment and Plan: The patient presents with a wound to the LLE. Patient is s/p vascular procedures. Patient dressing wounds with Ioplex.Swelling noted to bilateral extremities. On exam: LLE Two wounds present Wound beds are pink Periwound maceration s/p mechanical debridement RLE Leg swelling no open wounds blanchable erythema  The patient was positioned to allow for optimization of imaging. The fluorescence imaging device was placed 8-12 cm from the patient and the clinical darkness required for imaging was obtained by a dark drape. The wound measured by TA. The [employer] i:X fluorescence imaging device was used to report presence and location of cyan/red fluorescence, indicative of bacteria at loads greater than 104 CFU/g in real-time. Images reported to have cyan/red fluorescence. The wound was mechanically cleansed with Vashe and underwent mechanical debridement. Fluorescence images acquired after cleansing demonstrated reduction in bacteria. No clinical sign of infection  Wash wound with Dove skin sensitive soap and clean water LLE Applied Ioplex/Xtrasorb/Multilayer compression dressings RLE multilayer compression dressing to reduce swelling Change dressing 3 times per week. Leg elevation as tolerated Encouraged ambulation or exercise. Optimization of nutrition. Offloading to the wound site. Home care orders in place Follow up appointment scheduled for 2-3 weeks 9/18/23 left calf 2 wounds, drainage less The patient was positioned to allow for optimization of imaging. The fluorescence imaging device was placed 8-12 cm from the patient and the clinical darkness required for imaging was obtained by a dark drape. The wound measured ----- cm2 on the /left ---leg/foot). The [employer] i:X fluorescence imaging device was used to report presence and location of red and cyan fluorescence, indicative of bacteria at loads greater than 104 CFU/g in real-time. Images reported to have ---no/red & cyan----fluorescence. The wound was ----mechanically cleansed with vashe and/or underwent excisional debridement. Fluorescence images acquired after cleansing demonstrated reduction in bacteria. s/p excisional debridement has been using ioplex not using pump consistently s/p Left RFA  10/9/23 Patient presents for follow up of LLE wounds accompanied by her family memeber. On exam: Wound with red wound bed Periwound slightly macerated s/p mechanical debridement Today applied Zinc to the periwound Aquacel/Superabsorber to the wound bed K2 multilayer dressing HHA to moistened Ioplex before removing to avoid bleeding from the wound bed.  11/22/23 left leg wound superficial, edges with crusting, s/p excisional debridement has been using ioplex, vashe  right leg slightly warm, edematous - will treat, denies fever, pain The patient was positioned to allow for optimization of imaging. The fluorescence imaging device was placed 8-12 cm from the patient and the clinical darkness required for imaging was obtained by a dark drape. The wound measured   ----- cm2 on the /left ---leg. The [employer] i:X fluorescence imaging device was used to report presence and location of cyan fluorescence, indicative of bacteria at loads greater than 104 CFU/g in real-time. Images reported to have ---no cyan----fluorescence. The wound was ----mechanically cleansed with Dakins 0.125%/Iodine/0.9% saline) and/or underwent excisional debridement.  Fluorescence images acquired after cleansing demonstrated reduction in bacteria.   12/13/23 The patient is seen for follow-up for  LLE  wounds. States she is wearing compression garment. The RLE is swollen- doesn't wear stocking on that side due to inability to put the garment on.  1/10/24    Patient presents with her son for follow up of LLE wound. Patient wearing compression on the RLE and wrapped on the LLE. Patient had US testing performed today on b/l lower extremities. Results of testing discussed with patient and son.  On exam: Wounds have red granulation tissue, layer of yellow slough, wound edges macerated, periwound skin intact. s/p excisional debridement The patient was positioned to allow for optimization of imaging. The fluorescence imaging device was placed 8-12 cm from the patient and the clinical darkness required for imaging was obtained by a dark drape. The wound measured TA. The [employer] i:X fluorescence imaging device was used to report presence and location of cyan fluorescence, indicative of bacteria at loads greater than 104 CFU/g in real-time. Images reported to have cyan fluorescence. The wound was mechanically cleansed with Vashe and underwent excisional debridement. Fluorescence images acquired after cleansing demonstrated reduction in bacteria.  Affinity #1 applied today to patients LLE Wounds bed debrided of bioburden and prepped for product application. (1 pieces cut in two and 7 square cm) obtained. Total product usage was (2.96 sq. cm), Total waste (4.04 sq cm) due to wound size. Product secured with (sterile strips and bolster dressing). Maintain wound veil in place until next visit. Keep area dry. Patient has homecare. Has been dressing wound with Ioplex/Aquacel/multilayer dressing. Has a Lymphedema pump , not using daily. Patient to f/u in 2 weeks. 1/24/24 left leg - skin subsitute applied last week, still present on wound bed 1/31/24 s/p excisional debridement of slough, necrotic tissue, periwound hygiene wounds improved with Affinity. Recommend continuation of compression stocking to the RLE Plan for re application of Affinity.  Wound has shown improvement through increased granulation and decreased size)  Wound is free from infection, drainage and necrotic tissue.  (Patient has adequate blood supply as demonstrated by palpable pulses and an GALA of 1  Wound has been treated with standards of care for over 4 weeks including (compression, offloading, debridements.

## 2024-01-31 NOTE — PLAN
[FreeTextEntry1] : 9/18/23 Plan - shower wound, scrub misael wound skin well as this area positive on moleculight use pump daily, 45 minutes can c/w ioplex, will ask nurse to get acticoat, needs to wipe down leg and wound with vashe before applying products xtrasorb/dynaflex follow up 2-3 weeks  10/6/23 Plan: Moistened Ioplex with water to remove if it adheres to the wound bed. Plan - shower wound, scrub misael wound skin well  use pump daily, 45 minutes WAsh wound with Vashe, apply Zinc oxide to the misael wound Ioplex, xtrasorb/dynaflex follow up 2-3 weeks Nursing orders given. 11/22/23 Plan - orders for nurse c/w ioplex/super absorber/dynaflex follow up 2-3 weeks 12/13/23 Recommended  using compression garment on the RLE Continue compression garment on the LLE. Repeat vascular studies. Ioplex to LLE wounds. Compression wrap to LLE. The patient was positioned as follows .  The fluorescence imaging device was positioned 8-12 cm from the patient and the clinical darkness required for imaging was obtained.  The wound measured by Tissue [employer]  The [employer] i:X fluorescence imaging device was used to report presence and location of red or cyan fluorescence, indicative of bacteria at loads greater than 104 CFU/g in real-time.  Images reported to be positive mechanical washed.  Due to presence of infection causing bacteria the wound was cleansed.  Fluorescence images acquired after cleansing reported no reduction in bacteria..  I then turned my attention to debridement, resulting in decrease in the fluorescence image. measured for compression grment vascular test to be scheduled  1/10/24 Plan: Maintain wound veil in place. Do not remove wound veil Keep area dry Nursing orders given Patient measured for Circaids on the next visit. RTO in two weeks 1/24/24 Plan - veil to be left in place, change above follow up next week

## 2024-01-31 NOTE — PHYSICAL EXAM
[Normal Rate and Rhythm] : normal rate and rhythm [de-identified] : seated at table at home, good hygiene [de-identified] : camila [de-identified] : non labored [de-identified] : limited ambulation [de-identified] : awake, alert , conversant [Please See PDF for [employer]] : Please See PDF for [employer].

## 2024-02-04 ENCOUNTER — RX RENEWAL (OUTPATIENT)
Age: 79
End: 2024-02-04

## 2024-02-04 RX ORDER — FUROSEMIDE 40 MG/1
40 TABLET ORAL
Qty: 90 | Refills: 3 | Status: ACTIVE | COMMUNITY
Start: 2019-08-09 | End: 1900-01-01

## 2024-02-07 DIAGNOSIS — L97.929 NON-PRESSURE CHRONIC ULCER OF UNSPECIFIED PART OF LEFT LOWER LEG WITH UNSPECIFIED SEVERITY: ICD-10-CM

## 2024-02-07 DIAGNOSIS — R60.0 LOCALIZED EDEMA: ICD-10-CM

## 2024-02-07 DIAGNOSIS — I87.312 CHRONIC VENOUS HYPERTENSION (IDIOPATHIC) WITH ULCER OF LEFT LOWER EXTREMITY: ICD-10-CM

## 2024-02-07 DIAGNOSIS — I83.893 VARICOSE VEINS OF BILATERAL LOWER EXTREMITIES WITH OTHER COMPLICATIONS: ICD-10-CM

## 2024-02-07 DIAGNOSIS — I87.2 VENOUS INSUFFICIENCY (CHRONIC) (PERIPHERAL): ICD-10-CM
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
## 2024-02-07 DIAGNOSIS — S81.802A UNSPECIFIED OPEN WOUND, LEFT LOWER LEG, INITIAL ENCOUNTER: ICD-10-CM
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
## 2024-02-14 ENCOUNTER — OUTPATIENT (OUTPATIENT)
Dept: OUTPATIENT SERVICES | Facility: HOSPITAL | Age: 79
LOS: 1 days | End: 2024-02-14
Payer: MEDICARE

## 2024-02-14 ENCOUNTER — APPOINTMENT (OUTPATIENT)
Dept: WOUND CARE | Facility: HOSPITAL | Age: 79
End: 2024-02-14
Payer: MEDICARE

## 2024-02-14 VITALS
DIASTOLIC BLOOD PRESSURE: 68 MMHG | SYSTOLIC BLOOD PRESSURE: 128 MMHG | TEMPERATURE: 97.2 F | HEART RATE: 83 BPM | OXYGEN SATURATION: 92 %

## 2024-02-14 PROCEDURE — 15271 SKIN SUB GRAFT TRNK/ARM/LEG: CPT

## 2024-02-14 NOTE — PHYSICAL EXAM
[Normal Rate and Rhythm] : normal rate and rhythm [Please See PDF for [employer]] : Please See PDF for [employer]. [de-identified] : seated at table at home, good hygiene [de-identified] : camila [de-identified] : non labored [de-identified] : limited ambulation [de-identified] : awake, alert , conversant

## 2024-02-14 NOTE — ASSESSMENT
[FreeTextEntry1] : Assessment Chronic ulcer of other specified site (707.8) (L98.499) Chronic venous hypertension (idiopathic) with ulcer of left lower extremity (459.31) (I87.312,L97.929) Edema of both feet (782.3) (R60.0) 75 yr old woman with left leg venous ulcer , afib, HTN lymphedema  follow up after ablation done 1/26/21. for lt leg  data complex - mod lab,xr reviewed risk- surgery mod on ac, tolerated debridment well, improved pain 9/22/21 right leg was edematous last week so unna was applied, no wounds needs new compression garment left leg has been using ioplex s/p excisional debridement re-measured for compression garment 10/20/21 Wound being treated with ioplex, s/p excisional debridement slight periwound maceration noted 11/10/21 Left leg venous ulcer, wound decrease in size Patient has been using ioplex s/p excisional debridement periwound maceration /The patient was positioned to allow for optimization of imaging. The fluorescence imaging device was placed 8-12 cm from the patient and the clinical darkness required for imaging was obtained by a dark drape. The wound measured () on the (). The [employer] i:X fluorescence imaging device was used to report presence and location of cyan fluorescence, indicative of bacteria at loads greater than 104 CFU/g in real-time. Images reported to have cyan fluorescence. The wound was mechanically cleansed with Dakins 0.125% and underwent excisional debridement. Fluorescence images acquired after cleansing demonstrated reduction in bacteria.  1/14/22 wound clean and pink, size has hit a plateau according to visiting nurse measurements has been using ioplex 4/6/22 Apligraf number 2 applied today to patient's left leg. Wound bed debrided of bioburden and prepped for product application. 1 of pieces and original size of product) obtained. Total product usage was 7.7 sq. cm), Total waste 36.3sq. cm) due to wound size. Product secured with (steri strips and bolster dressing). Patient to f/u in 1 week.  The patient was positioned to allow for optimization of imaging. The fluorescence imaging device was placed 8-12 cm from the patient and the clinical darkness required for imaging was obtained by a dark drape. The wound measured ----- cm2 on the /left ---leg. The [employer] i:X fluorescence imaging device was used to report presence and location of red or cyan fluorescence, indicative of bacteria at loads greater than 104 CFU/g in real-time. s/p excisional debridement 5/16/22 wound clean, using isabel, distal 1/3 of wound is starting to pull into center of wound 5/23/22 apligraf number 3 applied today to patient's left leg. Wound bed debrided of bioburden and prepped for product application. 1 of pieces and original size of product) obtained. Total product usage was 12 sq. cm), Total waste 32 sq. cm) due to wound size. Product secured with (steri strips and bolster dressing). Patient to f/u in 1 week. The patient was positioned to allow for optimization of imaging. The fluorescence imaging device was placed 8-12 cm from the patient and the clinical darkness required for imaging was obtained by a dark drape. The wound measured TA on the /left ---leg). The [employer] i:X fluorescence imaging device was used to report presence and location of red fluorescence, indicative of bacteria at loads greater than 104 CFU/g in real-time. Images reported to have ---no/red/cyan----fluorescence. The wound was ----mechanically cleansed with vashe and/or underwent excisional debridement. Fluorescence images acquired after cleansing demonstrated reduction in bacteria. 8/1/22 USING PUMP WEARING COMPRESSION RIGHT LEG apligraf number () applied today to patient's (anatomical site). Wound bed debrided of bioburden and prepped for product application. (# of pieces and original size of product) obtained. Total product usage was (x sq. cm), Total waste (x sq. cm) due to wound size. Product secured with (steri strips and bolster dressing). Patient to f/u in 1 week.  11/16/22 puraply number 1 applied today to patient's left leg. Wound bed debrided of bioburden and prepped for product application. 1 of pieces and original size of product) obtained. Total product usage was 9 sq. cm), Total waste 6 due to wound size. Product secured with (steri strips and bolster dressing). Patient to f/u in 1 week. s/p excisional debridement  12/9/22 right leg, s/p evacuation hematoma and underlying venous insufficiency, has puraply application 2 weeks ago, at 6 0'clock island of skin and also in center of wound noted   12/28/2022 puraply number 2 applied today to patient's left leg. Wound bed debrided of bioburden and prepped for product application. 1 of pieces and original size of product) obtained. Total product usage was 9 sq. cm), Total waste 6 due to wound size. Product secured with (steri strips and bolster dressing). Patient to f/u in 1 week. s/p excisional debridement No clinical sign of infection The patient was positioned as follows. The fluorescence imaging device was positioned 8-12 cm from the patient and the clinical darkness required for imaging was obtained. The wound measured by [employer] The [employer] i:X fluorescence imaging device was used to report presence and location of red or cyan fluorescence, indicative of bacteria at loads greater than 104 CFU/g in real-time. Images reported to be negative. Due to presence of infection causing bacteria the wound was cleansed.e. 2/15/23 puraply number 1 applied today to patient's left leg. Wound bed debrided of bioburden and prepped for product application. 1 of pieces and original size of product obtained. Total product usage was 7.7 sq. cm), Total waste 2.3 sq. cm) due to wound size. Product secured with (steri strips and bolster dressing). Patient to f/u in 1 week.  7/5/23 Wound Assessment and Plan: The patient presents with a wound to the LLE. Patient is s/p vascular procedures. Patient dressing wounds with Ioplex.Swelling noted to bilateral extremities. On exam: LLE Two wounds present Wound beds are pink Periwound maceration s/p mechanical debridement RLE Leg swelling no open wounds blanchable erythema  The patient was positioned to allow for optimization of imaging. The fluorescence imaging device was placed 8-12 cm from the patient and the clinical darkness required for imaging was obtained by a dark drape. The wound measured by TA. The [employer] i:X fluorescence imaging device was used to report presence and location of cyan/red fluorescence, indicative of bacteria at loads greater than 104 CFU/g in real-time. Images reported to have cyan/red fluorescence. The wound was mechanically cleansed with Vashe and underwent mechanical debridement. Fluorescence images acquired after cleansing demonstrated reduction in bacteria. No clinical sign of infection  Wash wound with Dove skin sensitive soap and clean water LLE Applied Ioplex/Xtrasorb/Multilayer compression dressings RLE multilayer compression dressing to reduce swelling Change dressing 3 times per week. Leg elevation as tolerated Encouraged ambulation or exercise. Optimization of nutrition. Offloading to the wound site. Home care orders in place Follow up appointment scheduled for 2-3 weeks 9/18/23 left calf 2 wounds, drainage less The patient was positioned to allow for optimization of imaging. The fluorescence imaging device was placed 8-12 cm from the patient and the clinical darkness required for imaging was obtained by a dark drape. The wound measured ----- cm2 on the /left ---leg/foot). The [employer] i:X fluorescence imaging device was used to report presence and location of red and cyan fluorescence, indicative of bacteria at loads greater than 104 CFU/g in real-time. Images reported to have ---no/red & cyan----fluorescence. The wound was ----mechanically cleansed with vashe and/or underwent excisional debridement. Fluorescence images acquired after cleansing demonstrated reduction in bacteria. s/p excisional debridement has been using ioplex not using pump consistently s/p Left RFA  10/9/23 Patient presents for follow up of LLE wounds accompanied by her family memeber. On exam: Wound with red wound bed Periwound slightly macerated s/p mechanical debridement Today applied Zinc to the periwound Aquacel/Superabsorber to the wound bed K2 multilayer dressing HHA to moistened Ioplex before removing to avoid bleeding from the wound bed.  11/22/23 left leg wound superficial, edges with crusting, s/p excisional debridement has been using ioplex, vashe  right leg slightly warm, edematous - will treat, denies fever, pain The patient was positioned to allow for optimization of imaging. The fluorescence imaging device was placed 8-12 cm from the patient and the clinical darkness required for imaging was obtained by a dark drape. The wound measured   ----- cm2 on the /left ---leg. The [employer] i:X fluorescence imaging device was used to report presence and location of cyan fluorescence, indicative of bacteria at loads greater than 104 CFU/g in real-time. Images reported to have ---no cyan----fluorescence. The wound was ----mechanically cleansed with Dakins 0.125%/Iodine/0.9% saline) and/or underwent excisional debridement.  Fluorescence images acquired after cleansing demonstrated reduction in bacteria.   12/13/23 The patient is seen for follow-up for  LLE  wounds. States she is wearing compression garment. The RLE is swollen- doesn't wear stocking on that side due to inability to put the garment on.  1/10/24    Patient presents with her son for follow up of LLE wound. Patient wearing compression on the RLE and wrapped on the LLE. Patient had US testing performed today on b/l lower extremities. Results of testing discussed with patient and son.  On exam: Wounds have red granulation tissue, layer of yellow slough, wound edges macerated, periwound skin intact. s/p excisional debridement The patient was positioned to allow for optimization of imaging. The fluorescence imaging device was placed 8-12 cm from the patient and the clinical darkness required for imaging was obtained by a dark drape. The wound measured TA. The [employer] i:X fluorescence imaging device was used to report presence and location of cyan fluorescence, indicative of bacteria at loads greater than 104 CFU/g in real-time. Images reported to have cyan fluorescence. The wound was mechanically cleansed with Vashe and underwent excisional debridement. Fluorescence images acquired after cleansing demonstrated reduction in bacteria.  Affinity #1 applied today to patients LLE Wounds bed debrided of bioburden and prepped for product application. (1 pieces cut in two and 7 square cm) obtained. Total product usage was (2.96 sq. cm), Total waste (4.04 sq cm) due to wound size. Product secured with (sterile strips and bolster dressing). Maintain wound veil in place until next visit. Keep area dry. Patient has homecare. Has been dressing wound with Ioplex/Aquacel/multilayer dressing. Has a Lymphedema pump , not using daily. Patient to f/u in 2 weeks. 1/24/24 left leg - skin subsitute applied last week, still present on wound bed 1/31/24 s/p excisional debridement of slough, necrotic tissue, periwound hygiene wounds improved with Affinity. Recommend continuation of compression stocking to the RLE Plan for re application of Affinity.  Wound has shown improvement through increased granulation and decreased size)  Wound is free from infection, drainage and necrotic tissue.  (Patient has adequate blood supply as demonstrated by palpable pulses and an GALA of 1  Wound has been treated with standards of care for over 4 weeks including (compression, offloading, debridements.   02/14/2 s/p excisional debridement of slough, necrotic tissue, periwound hygiene wounds improved with Affinity.  The patient was positioned to allow for optimization of imaging. The fluorescence imaging device was placed 8-12 cm from the patient and the clinical darkness required for imaging was obtained by a dark drape.  The [employer] i:X fluorescence imaging device was used to report presence and location of red or cyan fluorescence, indicative of bacteria at loads greater than 104 CFU/g in real-time. Images reported to have ---no/red/cyan----fluorescence. The wound was ----mechanically cleansed with Dakins 0.125%/Iodine/0.9% saline) and/or underwent excisional debridement.  Fluorescence images acquired after cleansing demonstrated reduction in bacteria. No sign of infection. Patient does not smoke. Affinity #2 applied today to patients LLE Wounds bed debrided of bioburden and prepped for product application. (1 pieces cut in two and 7 square cm) obtained. Total product usage was (7 sq. cm), Total waste (0 sq cm) due to wound size. Product secured with (sterile strips and bolster dressing). Maintain wound veil in place until next visit. Keep area dry. Recommend continuation of compression stocking to the RLE

## 2024-02-14 NOTE — PLAN
Requested Prescriptions   Pending Prescriptions Disp Refills     ZOVIA 1/35E, 28, 1-35 MG-MCG per tablet [Pharmacy Med Name: ZOVIA 1/35 TABLETS 28S]  Last Written Prescription Date:  1/2/17  Last Fill Quantity: 84,  # refills: 4   Last Office Visit with [facility], P or [facility] prescribing provider:  1/2/17   Future Office Visit:      84 tablet 0     Sig: TAKE 1 TABLET BY MOUTH DAILY    Contraceptives Protocol Passed    12/18/2017 10:27 AM       Passed - Patient is not a current smoker if age is 35 or older       Passed - Recent or future visit with authorizing provider's specialty    Patient had office visit in the last year or has a visit in the next 30 days with authorizing provider.  See chart review.              Passed - No active pregnancy on record       Passed - No positive pregnancy test in past 12 months           [FreeTextEntry1] : 9/18/23 Plan - shower wound, scrub misael wound skin well as this area positive on moleculight use pump daily, 45 minutes can c/w ioplex, will ask nurse to get acticoat, needs to wipe down leg and wound with vashe before applying products xtrasorb/dynaflex follow up 2-3 weeks  10/6/23 Plan: Moistened Ioplex with water to remove if it adheres to the wound bed. Plan - shower wound, scrub misael wound skin well  use pump daily, 45 minutes WAsh wound with Vashe, apply Zinc oxide to the misael wound Ioplex, xtrasorb/dynaflex follow up 2-3 weeks Nursing orders given. 11/22/23 Plan - orders for nurse c/w ioplex/super absorber/dynaflex follow up 2-3 weeks 12/13/23 Recommended  using compression garment on the RLE Continue compression garment on the LLE. Repeat vascular studies. Ioplex to LLE wounds. Compression wrap to LLE. The patient was positioned as follows .  The fluorescence imaging device was positioned 8-12 cm from the patient and the clinical darkness required for imaging was obtained.  The wound measured by [employer]..  The [employer] i:X fluorescence imaging device was used to report presence and location of red or cyan fluorescence, indicative of bacteria at loads greater than 104 CFU/g in real-time.  Images reported to be positive mechanical washed.  Due to presence of infection causing bacteria the wound was cleansed.  Fluorescence images acquired after cleansing reported no reduction in bacteria..  I then turned my attention to debridement, resulting in decrease in the fluorescence image. measured for compression grment vascular test to be scheduled  1/10/24 Plan: Maintain wound veil in place. Do not remove wound veil Keep area dry Nursing orders given Patient measured for Circaids on the next visit. RTO in two weeks   1/14/24 Plan - veil to be left in place, change above follow up in 2 weeks nursing orders given

## 2024-02-20 DIAGNOSIS — L97.929 NON-PRESSURE CHRONIC ULCER OF UNSPECIFIED PART OF LEFT LOWER LEG WITH UNSPECIFIED SEVERITY: ICD-10-CM

## 2024-02-20 DIAGNOSIS — S81.802A UNSPECIFIED OPEN WOUND, LEFT LOWER LEG, INITIAL ENCOUNTER: ICD-10-CM

## 2024-02-20 DIAGNOSIS — I83.893 VARICOSE VEINS OF BILATERAL LOWER EXTREMITIES WITH OTHER COMPLICATIONS: ICD-10-CM
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
## 2024-02-20 DIAGNOSIS — I87.312 CHRONIC VENOUS HYPERTENSION (IDIOPATHIC) WITH ULCER OF LEFT LOWER EXTREMITY: ICD-10-CM
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
## 2024-02-20 DIAGNOSIS — I87.2 VENOUS INSUFFICIENCY (CHRONIC) (PERIPHERAL): ICD-10-CM

## 2024-02-20 DIAGNOSIS — I89.0 LYMPHEDEMA, NOT ELSEWHERE CLASSIFIED: ICD-10-CM

## 2024-02-28 ENCOUNTER — OUTPATIENT (OUTPATIENT)
Dept: OUTPATIENT SERVICES | Facility: HOSPITAL | Age: 79
LOS: 1 days | End: 2024-02-28
Payer: MEDICARE

## 2024-02-28 ENCOUNTER — APPOINTMENT (OUTPATIENT)
Dept: WOUND CARE | Facility: HOSPITAL | Age: 79
End: 2024-02-28
Payer: MEDICARE

## 2024-02-28 VITALS
HEART RATE: 96 BPM | DIASTOLIC BLOOD PRESSURE: 81 MMHG | SYSTOLIC BLOOD PRESSURE: 128 MMHG | OXYGEN SATURATION: 92 % | TEMPERATURE: 97.9 F

## 2024-02-28 DIAGNOSIS — M06.9 RHEUMATOID ARTHRITIS, UNSPECIFIED: ICD-10-CM

## 2024-02-28 PROCEDURE — 11042 DBRDMT SUBQ TIS 1ST 20SQCM/<: CPT

## 2024-02-28 NOTE — ASSESSMENT
[FreeTextEntry1] : Assessment Chronic ulcer of other specified site (707.8) (L98.499) Chronic venous hypertension (idiopathic) with ulcer of left lower extremity (459.31) (I87.312,L97.929) Edema of both feet (782.3) (R60.0) 75 yr old woman with left leg venous ulcer , afib, HTN lymphedema  follow up after ablation done 1/26/21. for lt leg  data complex - mod lab,xr reviewed risk- surgery mod on ac, tolerated debridment well, improved pain 9/22/21 right leg was edematous last week so unna was applied, no wounds needs new compression garment left leg has been using ioplex s/p excisional debridement re-measured for compression garment 10/20/21 Wound being treated with ioplex, s/p excisional debridement slight periwound maceration noted 11/10/21 Left leg venous ulcer, wound decrease in size Patient has been using ioplex s/p excisional debridement periwound maceration /The patient was positioned to allow for optimization of imaging. The fluorescence imaging device was placed 8-12 cm from the patient and the clinical darkness required for imaging was obtained by a dark drape. The wound measured () on the (). The Accord i:X fluorescence imaging device was used to report presence and location of cyan fluorescence, indicative of bacteria at loads greater than 104 CFU/g in real-time. Images reported to have cyan fluorescence. The wound was mechanically cleansed with Dakins 0.125% and underwent excisional debridement. Fluorescence images acquired after cleansing demonstrated reduction in bacteria.  1/14/22 wound clean and pink, size has hit a plateau according to visiting nurse measurements has been using ioplex 4/6/22 Apligraf number 2 applied today to patient's left leg. Wound bed debrided of bioburden and prepped for product application. 1 of pieces and original size of product) obtained. Total product usage was 7.7 sq. cm), Total waste 36.3sq. cm) due to wound size. Product secured with (steri strips and bolster dressing). Patient to f/u in 1 week.  The patient was positioned to allow for optimization of imaging. The fluorescence imaging device was placed 8-12 cm from the patient and the clinical darkness required for imaging was obtained by a dark drape. The wound measured ----- cm2 on the /left ---leg. The Tribridget i:X fluorescence imaging device was used to report presence and location of red or cyan fluorescence, indicative of bacteria at loads greater than 104 CFU/g in real-time. s/p excisional debridement 5/16/22 wound clean, using isabel, distal 1/3 of wound is starting to pull into center of wound 5/23/22 apligraf number 3 applied today to patient's left leg. Wound bed debrided of bioburden and prepped for product application. 1 of pieces and original size of product) obtained. Total product usage was 12 sq. cm), Total waste 32 sq. cm) due to wound size. Product secured with (steri strips and bolster dressing). Patient to f/u in 1 week. The patient was positioned to allow for optimization of imaging. The fluorescence imaging device was placed 8-12 cm from the patient and the clinical darkness required for imaging was obtained by a dark drape. The wound measured TA on the /left ---leg). The Tribridget i:X fluorescence imaging device was used to report presence and location of red fluorescence, indicative of bacteria at loads greater than 104 CFU/g in real-time. Images reported to have ---no/red/cyan----fluorescence. The wound was ----mechanically cleansed with vashe and/or underwent excisional debridement. Fluorescence images acquired after cleansing demonstrated reduction in bacteria. 8/1/22 USING PUMP WEARING COMPRESSION RIGHT LEG apligraf number () applied today to patient's (anatomical site). Wound bed debrided of bioburden and prepped for product application. (# of pieces and original size of product) obtained. Total product usage was (x sq. cm), Total waste (x sq. cm) due to wound size. Product secured with (steri strips and bolster dressing). Patient to f/u in 1 week.  11/16/22 puraply number 1 applied today to patient's left leg. Wound bed debrided of bioburden and prepped for product application. 1 of pieces and original size of product) obtained. Total product usage was 9 sq. cm), Total waste 6 due to wound size. Product secured with (steri strips and bolster dressing). Patient to f/u in 1 week. s/p excisional debridement  12/9/22 right leg, s/p evacuation hematoma and underlying venous insufficiency, has puraply application 2 weeks ago, at 6 0'clock island of skin and also in center of wound noted   12/28/2022 puraply number 2 applied today to patient's left leg. Wound bed debrided of bioburden and prepped for product application. 1 of pieces and original size of product) obtained. Total product usage was 9 sq. cm), Total waste 6 due to wound size. Product secured with (steri strips and bolster dressing). Patient to f/u in 1 week. s/p excisional debridement No clinical sign of infection The patient was positioned as follows. The fluorescence imaging device was positioned 8-12 cm from the patient and the clinical darkness required for imaging was obtained. The wound measured by [employer] The Accord i:X fluorescence imaging device was used to report presence and location of red or cyan fluorescence, indicative of bacteria at loads greater than 104 CFU/g in real-time. Images reported to be negative. Due to presence of infection causing bacteria the wound was cleansed.e. 2/15/23 puraply number 1 applied today to patient's left leg. Wound bed debrided of bioburden and prepped for product application. 1 of pieces and original size of product obtained. Total product usage was 7.7 sq. cm), Total waste 2.3 sq. cm) due to wound size. Product secured with (steri strips and bolster dressing). Patient to f/u in 1 week.  7/5/23 Wound Assessment and Plan: The patient presents with a wound to the LLE. Patient is s/p vascular procedures. Patient dressing wounds with Ioplex.Swelling noted to bilateral extremities. On exam: LLE Two wounds present Wound beds are pink Periwound maceration s/p mechanical debridement RLE Leg swelling no open wounds blanchable erythema  The patient was positioned to allow for optimization of imaging. The fluorescence imaging device was placed 8-12 cm from the patient and the clinical darkness required for imaging was obtained by a dark drape. The wound measured by TA. The Accord i:X fluorescence imaging device was used to report presence and location of cyan/red fluorescence, indicative of bacteria at loads greater than 104 CFU/g in real-time. Images reported to have cyan/red fluorescence. The wound was mechanically cleansed with Vashe and underwent mechanical debridement. Fluorescence images acquired after cleansing demonstrated reduction in bacteria. No clinical sign of infection  Wash wound with Dove skin sensitive soap and clean water LLE Applied Ioplex/Xtrasorb/Multilayer compression dressings RLE multilayer compression dressing to reduce swelling Change dressing 3 times per week. Leg elevation as tolerated Encouraged ambulation or exercise. Optimization of nutrition. Offloading to the wound site. Home care orders in place Follow up appointment scheduled for 2-3 weeks 9/18/23 left calf 2 wounds, drainage less The patient was positioned to allow for optimization of imaging. The fluorescence imaging device was placed 8-12 cm from the patient and the clinical darkness required for imaging was obtained by a dark drape. The wound measured ----- cm2 on the /left ---leg/foot). The Accord i:X fluorescence imaging device was used to report presence and location of red and cyan fluorescence, indicative of bacteria at loads greater than 104 CFU/g in real-time. Images reported to have ---no/red & cyan----fluorescence. The wound was ----mechanically cleansed with vashe and/or underwent excisional debridement. Fluorescence images acquired after cleansing demonstrated reduction in bacteria. s/p excisional debridement has been using ioplex not using pump consistently s/p Left RFA  10/9/23 Patient presents for follow up of LLE wounds accompanied by her family memeber. On exam: Wound with red wound bed Periwound slightly macerated s/p mechanical debridement Today applied Zinc to the periwound Aquacel/Superabsorber to the wound bed K2 multilayer dressing HHA to moistened Ioplex before removing to avoid bleeding from the wound bed.  11/22/23 left leg wound superficial, edges with crusting, s/p excisional debridement has been using ioplex, vashe  right leg slightly warm, edematous - will treat, denies fever, pain The patient was positioned to allow for optimization of imaging. The fluorescence imaging device was placed 8-12 cm from the patient and the clinical darkness required for imaging was obtained by a dark drape. The wound measured   ----- cm2 on the /left ---leg. The Tribridget i:X fluorescence imaging device was used to report presence and location of cyan fluorescence, indicative of bacteria at loads greater than 104 CFU/g in real-time. Images reported to have ---no cyan----fluorescence. The wound was ----mechanically cleansed with Dakins 0.125%/Iodine/0.9% saline) and/or underwent excisional debridement.  Fluorescence images acquired after cleansing demonstrated reduction in bacteria.   12/13/23 The patient is seen for follow-up for  LLE  wounds. States she is wearing compression garment. The RLE is swollen- doesn't wear stocking on that side due to inability to put the garment on.  1/10/24    Patient presents with her son for follow up of LLE wound. Patient wearing compression on the RLE and wrapped on the LLE. Patient had US testing performed today on b/l lower extremities. Results of testing discussed with patient and son.  On exam: Wounds have red granulation tissue, layer of yellow slough, wound edges macerated, periwound skin intact. s/p excisional debridement The patient was positioned to allow for optimization of imaging. The fluorescence imaging device was placed 8-12 cm from the patient and the clinical darkness required for imaging was obtained by a dark drape. The wound measured TA. The Tribridget i:X fluorescence imaging device was used to report presence and location of cyan fluorescence, indicative of bacteria at loads greater than 104 CFU/g in real-time. Images reported to have cyan fluorescence. The wound was mechanically cleansed with Vashe and underwent excisional debridement. Fluorescence images acquired after cleansing demonstrated reduction in bacteria.  Affinity #1 applied today to patients LLE Wounds bed debrided of bioburden and prepped for product application. (1 pieces cut in two and 7 square cm) obtained. Total product usage was (2.96 sq. cm), Total waste (4.04 sq cm) due to wound size. Product secured with (sterile strips and bolster dressing). Maintain wound veil in place until next visit. Keep area dry. Patient has homecare. Has been dressing wound with Ioplex/Aquacel/multilayer dressing. Has a Lymphedema pump , not using daily. Patient to f/u in 2 weeks. 1/24/24 left leg - skin subsitute applied last week, still present on wound bed 1/31/24 s/p excisional debridement of slough, necrotic tissue, periwound hygiene wounds improved with Affinity. Recommend continuation of compression stocking to the RLE Plan for re application of Affinity.  Wound has shown improvement through increased granulation and decreased size)  Wound is free from infection, drainage and necrotic tissue.  (Patient has adequate blood supply as demonstrated by palpable pulses and an GALA of 1  Wound has been treated with standards of care for over 4 weeks including (compression, offloading, debridements.   02/14/2 s/p excisional debridement of slough, necrotic tissue, periwound hygiene wounds improved with Affinity.  The patient was positioned to allow for optimization of imaging. The fluorescence imaging device was placed 8-12 cm from the patient and the clinical darkness required for imaging was obtained by a dark drape.  The Accord i:X fluorescence imaging device was used to report presence and location of red or cyan fluorescence, indicative of bacteria at loads greater than 104 CFU/g in real-time. Images reported to have ---no/red/cyan----fluorescence. The wound was ----mechanically cleansed with Dakins 0.125%/Iodine/0.9% saline) and/or underwent excisional debridement.  Fluorescence images acquired after cleansing demonstrated reduction in bacteria. No sign of infection. Patient does not smoke. Affinity #2 applied today to patients LLE Wounds bed debrided of bioburden and prepped for product application. (1 pieces cut in two and 7 square cm) obtained. Total product usage was (7 sq. cm), Total waste (0 sq cm) due to wound size. Product secured with (sterile strips and bolster dressing). Maintain wound veil in place until next visit. Keep area dry. Recommend continuation of compression stocking to the RLE   02/28/2024 The patient is seen for follow-up for  LLE  wounds. States she is wearing compression garment. affinity was applied on the last visit On exam: Wounds have red granulation tissue, layer of yellow slough, wound edges macerated, periwound skin intact. s/p excisional debridement Plan for re application of (affinity). Wound has shown improvement through (increased granulation and/or decreased size). Wound is free from infection, drainage and necrotic tissue. (Patient has adequate blood supply as demonstrated by palpable pulses and an GALA of (1.1) from (date). Patient is on a comprehensive diabetic management program with a  Wound has been treated with standards of care for (4) weeks including (compression, offloading, debridements).

## 2024-02-28 NOTE — HISTORY OF PRESENT ILLNESS
[FreeTextEntry1] : Ms. FLORINDA VALVERDE   presents to the office with a recurrent wound for several months duration. Left leg ulcer since 10/9/2020.   \par  \par  Prior, The wound is located on  the Left lower extremity.  The patient has complaints of mild pain and mild swelling. The patient has been dressing the wound with Ioplex foam and unna boots. The patient denies fevers or chills.  The patient has localized pain to the wound upon dressing changes.  The patient has no other complaints or associated symptoms.\par  \par  Patient has homecare nurse coming 3x's per week for wound care (4) completely dependent

## 2024-02-28 NOTE — PHYSICAL EXAM
[Normal Rate and Rhythm] : normal rate and rhythm [Please See PDF for [employer]] : Please See PDF for [employer]. [de-identified] : camila [de-identified] : seated at table at home, good hygiene [de-identified] : non labored [de-identified] : limited ambulation [de-identified] : awake, alert , conversant

## 2024-02-28 NOTE — PLAN
[FreeTextEntry1] : 9/18/23 Plan - shower wound, scrub misael wound skin well as this area positive on moleculight use pump daily, 45 minutes can c/w ioplex, will ask nurse to get acticoat, needs to wipe down leg and wound with vashe before applying products xtrasorb/dynaflex follow up 2-3 weeks  10/6/23 Plan: Moistened Ioplex with water to remove if it adheres to the wound bed. Plan - shower wound, scrub misael wound skin well  use pump daily, 45 minutes WAsh wound with Vashe, apply Zinc oxide to the misael wound Ioplex, xtrasorb/dynaflex follow up 2-3 weeks Nursing orders given. 11/22/23 Plan - orders for nurse c/w ioplex/super absorber/dynaflex follow up 2-3 weeks 12/13/23 Recommended  using compression garment on the RLE Continue compression garment on the LLE. Repeat vascular studies. Ioplex to LLE wounds. Compression wrap to LLE. The patient was positioned as follows .  The fluorescence imaging device was positioned 8-12 cm from the patient and the clinical darkness required for imaging was obtained.  The wound measured by Tissue [employer]..  The Demandware i:X fluorescence imaging device was used to report presence and location of red or cyan fluorescence, indicative of bacteria at loads greater than 104 CFU/g in real-time.  Images reported to be positive mechanical washed.  Due to presence of infection causing bacteria the wound was cleansed.  Fluorescence images acquired after cleansing reported no reduction in bacteria..  I then turned my attention to debridement, resulting in decrease in the fluorescence image. measured for compression grment vascular test to be scheduled  1/10/24 Plan: Maintain wound veil in place. Do not remove wound veil Keep area dry Nursing orders given Patient measured for Circaids on the next visit. RTO in two weeks   1/14/24 Plan - veil to be left in place, change above follow up in 2 weeks nursing orders given  02/28/2024 isabel, xtrasorb, multilayer dressing follow up in 2-3 weeks nursing orders given Affinity will be ordered

## 2024-03-04 NOTE — REASON FOR VISIT
Let patient know that she is on wait list and will be informed if a sooner appointment becomes available.   [Procedure: _________] : a [unfilled] procedure visit negative... [Spouse] : spouse [FreeTextEntry1] : left leg ulcer

## 2024-03-12 DIAGNOSIS — I89.0 LYMPHEDEMA, NOT ELSEWHERE CLASSIFIED: ICD-10-CM

## 2024-03-12 DIAGNOSIS — L97.929 NON-PRESSURE CHRONIC ULCER OF UNSPECIFIED PART OF LEFT LOWER LEG WITH UNSPECIFIED SEVERITY: ICD-10-CM
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
## 2024-03-12 DIAGNOSIS — S81.802A UNSPECIFIED OPEN WOUND, LEFT LOWER LEG, INITIAL ENCOUNTER: ICD-10-CM

## 2024-03-12 DIAGNOSIS — I87.312 CHRONIC VENOUS HYPERTENSION (IDIOPATHIC) WITH ULCER OF LEFT LOWER EXTREMITY: ICD-10-CM

## 2024-03-12 DIAGNOSIS — M06.9 RHEUMATOID ARTHRITIS, UNSPECIFIED: ICD-10-CM

## 2024-03-12 DIAGNOSIS — R60.0 LOCALIZED EDEMA: ICD-10-CM
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
## 2024-03-20 ENCOUNTER — OUTPATIENT (OUTPATIENT)
Dept: OUTPATIENT SERVICES | Facility: HOSPITAL | Age: 79
LOS: 1 days | End: 2024-03-20
Payer: MEDICARE

## 2024-03-20 ENCOUNTER — APPOINTMENT (OUTPATIENT)
Dept: WOUND CARE | Facility: HOSPITAL | Age: 79
End: 2024-03-20
Payer: MEDICARE

## 2024-03-20 PROCEDURE — 15271 SKIN SUB GRAFT TRNK/ARM/LEG: CPT

## 2024-03-20 RX ORDER — CEPHALEXIN 500 MG/1
500 CAPSULE ORAL 3 TIMES DAILY
Qty: 9 | Refills: 0 | Status: ACTIVE | COMMUNITY
Start: 2024-03-20 | End: 1900-01-01

## 2024-03-27 NOTE — ASSESSMENT
[FreeTextEntry1] : Assessment Chronic ulcer of other specified site (707.8) (L98.499) Chronic venous hypertension (idiopathic) with ulcer of left lower extremity (459.31) (I87.312,L97.929) Edema of both feet (782.3) (R60.0) 75 yr old woman with left leg venous ulcer , afib, HTN lymphedema  follow up after ablation done 1/26/21. for lt leg  data complex - mod lab,xr reviewed risk- surgery mod on ac, tolerated debridment well, improved pain 9/22/21 right leg was edematous last week so unna was applied, no wounds needs new compression garment left leg has been using ioplex s/p excisional debridement re-measured for compression garment 10/20/21 Wound being treated with ioplex, s/p excisional debridement slight periwound maceration noted 11/10/21 Left leg venous ulcer, wound decrease in size Patient has been using ioplex s/p excisional debridement periwound maceration /The patient was positioned to allow for optimization of imaging. The fluorescence imaging device was placed 8-12 cm from the patient and the clinical darkness required for imaging was obtained by a dark drape. The wound measured () on the (). The [place] i:X fluorescence imaging device was used to report presence and location of cyan fluorescence, indicative of bacteria at loads greater than 104 CFU/g in real-time. Images reported to have cyan fluorescence. The wound was mechanically cleansed with Dakins 0.125% and underwent excisional debridement. Fluorescence images acquired after cleansing demonstrated reduction in bacteria.  1/14/22 wound clean and pink, size has hit a plateau according to visiting nurse measurements has been using ioplex 4/6/22 Apligraf number 2 applied today to patient's left leg. Wound bed debrided of bioburden and prepped for product application. 1 of pieces and original size of product) obtained. Total product usage was 7.7 sq. cm), Total waste 36.3sq. cm) due to wound size. Product secured with (steri strips and bolster dressing). Patient to f/u in 1 week.  The patient was positioned to allow for optimization of imaging. The fluorescence imaging device was placed 8-12 cm from the patient and the clinical darkness required for imaging was obtained by a dark drape. The wound measured ----- cm2 on the /left ---leg. The [place] i:X fluorescence imaging device was used to report presence and location of red or cyan fluorescence, indicative of bacteria at loads greater than 104 CFU/g in real-time. s/p excisional debridement 5/16/22 wound clean, using isabel, distal 1/3 of wound is starting to pull into center of wound 5/23/22 apligraf number 3 applied today to patient's left leg. Wound bed debrided of bioburden and prepped for product application. 1 of pieces and original size of product) obtained. Total product usage was 12 sq. cm), Total waste 32 sq. cm) due to wound size. Product secured with (steri strips and bolster dressing). Patient to f/u in 1 week. The patient was positioned to allow for optimization of imaging. The fluorescence imaging device was placed 8-12 cm from the patient and the clinical darkness required for imaging was obtained by a dark drape. The wound measured TA on the /left ---leg). The [place] i:X fluorescence imaging device was used to report presence and location of red fluorescence, indicative of bacteria at loads greater than 104 CFU/g in real-time. Images reported to have ---no/red/cyan----fluorescence. The wound was ----mechanically cleansed with vashe and/or underwent excisional debridement. Fluorescence images acquired after cleansing demonstrated reduction in bacteria. 8/1/22 USING PUMP WEARING COMPRESSION RIGHT LEG apligraf number () applied today to patient's (anatomical site). Wound bed debrided of bioburden and prepped for product application. (# of pieces and original size of product) obtained. Total product usage was (x sq. cm), Total waste (x sq. cm) due to wound size. Product secured with (steri strips and bolster dressing). Patient to f/u in 1 week.  11/16/22 puraply number 1 applied today to patient's left leg. Wound bed debrided of bioburden and prepped for product application. 1 of pieces and original size of product) obtained. Total product usage was 9 sq. cm), Total waste 6 due to wound size. Product secured with (steri strips and bolster dressing). Patient to f/u in 1 week. s/p excisional debridement  12/9/22 right leg, s/p evacuation hematoma and underlying venous insufficiency, has puraply application 2 weeks ago, at 6 0'clock island of skin and also in center of wound noted   12/28/2022 puraply number 2 applied today to patient's left leg. Wound bed debrided of bioburden and prepped for product application. 1 of pieces and original size of product) obtained. Total product usage was 9 sq. cm), Total waste 6 due to wound size. Product secured with (steri strips and bolster dressing). Patient to f/u in 1 week. s/p excisional debridement No clinical sign of infection The patient was positioned as follows. The fluorescence imaging device was positioned 8-12 cm from the patient and the clinical darkness required for imaging was obtained. The wound measured by [employer].. The [place] i:X fluorescence imaging device was used to report presence and location of red or cyan fluorescence, indicative of bacteria at loads greater than 104 CFU/g in real-time. Images reported to be negative. Due to presence of infection causing bacteria the wound was cleansed.e. 2/15/23 puraply number 1 applied today to patient's left leg. Wound bed debrided of bioburden and prepped for product application. 1 of pieces and original size of product obtained. Total product usage was 7.7 sq. cm), Total waste 2.3 sq. cm) due to wound size. Product secured with (steri strips and bolster dressing). Patient to f/u in 1 week.  7/5/23 Wound Assessment and Plan: The patient presents with a wound to the LLE. Patient is s/p vascular procedures. Patient dressing wounds with Ioplex.Swelling noted to bilateral extremities. On exam: LLE Two wounds present Wound beds are pink Periwound maceration s/p mechanical debridement RLE Leg swelling no open wounds blanchable erythema  The patient was positioned to allow for optimization of imaging. The fluorescence imaging device was placed 8-12 cm from the patient and the clinical darkness required for imaging was obtained by a dark drape. The wound measured by TA. The [place] i:X fluorescence imaging device was used to report presence and location of cyan/red fluorescence, indicative of bacteria at loads greater than 104 CFU/g in real-time. Images reported to have cyan/red fluorescence. The wound was mechanically cleansed with Vashe and underwent mechanical debridement. Fluorescence images acquired after cleansing demonstrated reduction in bacteria. No clinical sign of infection  Wash wound with Dove skin sensitive soap and clean water LLE Applied Ioplex/Xtrasorb/Multilayer compression dressings RLE multilayer compression dressing to reduce swelling Change dressing 3 times per week. Leg elevation as tolerated Encouraged ambulation or exercise. Optimization of nutrition. Offloading to the wound site. Home care orders in place Follow up appointment scheduled for 2-3 weeks 9/18/23 left calf 2 wounds, drainage less The patient was positioned to allow for optimization of imaging. The fluorescence imaging device was placed 8-12 cm from the patient and the clinical darkness required for imaging was obtained by a dark drape. The wound measured ----- cm2 on the /left ---leg/foot). The [place] i:X fluorescence imaging device was used to report presence and location of red and cyan fluorescence, indicative of bacteria at loads greater than 104 CFU/g in real-time. Images reported to have ---no/red & cyan----fluorescence. The wound was ----mechanically cleansed with vashe and/or underwent excisional debridement. Fluorescence images acquired after cleansing demonstrated reduction in bacteria. s/p excisional debridement has been using ioplex not using pump consistently s/p Left RFA  10/9/23 Patient presents for follow up of LLE wounds accompanied by her family memeber. On exam: Wound with red wound bed Periwound slightly macerated s/p mechanical debridement Today applied Zinc to the periwound Aquacel/Superabsorber to the wound bed K2 multilayer dressing HHA to moistened Ioplex before removing to avoid bleeding from the wound bed.  11/22/23 left leg wound superficial, edges with crusting, s/p excisional debridement has been using ioplex, vashe  right leg slightly warm, edematous - will treat, denies fever, pain The patient was positioned to allow for optimization of imaging. The fluorescence imaging device was placed 8-12 cm from the patient and the clinical darkness required for imaging was obtained by a dark drape. The wound measured   ----- cm2 on the /left ---leg. The [place] i:X fluorescence imaging device was used to report presence and location of cyan fluorescence, indicative of bacteria at loads greater than 104 CFU/g in real-time. Images reported to have ---no cyan----fluorescence. The wound was ----mechanically cleansed with Dakins 0.125%/Iodine/0.9% saline) and/or underwent excisional debridement.  Fluorescence images acquired after cleansing demonstrated reduction in bacteria.   12/13/23 The patient is seen for follow-up for  LLE  wounds. States she is wearing compression garment. The RLE is swollen- doesn't wear stocking on that side due to inability to put the garment on.  1/10/24    Patient presents with her son for follow up of LLE wound. Patient wearing compression on the RLE and wrapped on the LLE. Patient had US testing performed today on b/l lower extremities. Results of testing discussed with patient and son.  On exam: Wounds have red granulation tissue, layer of yellow slough, wound edges macerated, periwound skin intact. s/p excisional debridement The patient was positioned to allow for optimization of imaging. The fluorescence imaging device was placed 8-12 cm from the patient and the clinical darkness required for imaging was obtained by a dark drape. The wound measured TA. The [place] i:X fluorescence imaging device was used to report presence and location of cyan fluorescence, indicative of bacteria at loads greater than 104 CFU/g in real-time. Images reported to have cyan fluorescence. The wound was mechanically cleansed with Vashe and underwent excisional debridement. Fluorescence images acquired after cleansing demonstrated reduction in bacteria.  Affinity #1 applied today to patients LLE Wounds bed debrided of bioburden and prepped for product application. (1 pieces cut in two and 7 square cm) obtained. Total product usage was (2.96 sq. cm), Total waste (4.04 sq cm) due to wound size. Product secured with (sterile strips and bolster dressing). Maintain wound veil in place until next visit. Keep area dry. Patient has homecare. Has been dressing wound with Ioplex/Aquacel/multilayer dressing. Has a Lymphedema pump , not using daily. Patient to f/u in 2 weeks. 1/24/24 left leg - skin subsitute applied last week, still present on wound bed 1/31/24 s/p excisional debridement of slough, necrotic tissue, periwound hygiene wounds improved with Affinity. Recommend continuation of compression stocking to the RLE Plan for re application of Affinity.  Wound has shown improvement through increased granulation and decreased size)  Wound is free from infection, drainage and necrotic tissue.  (Patient has adequate blood supply as demonstrated by palpable pulses and an GALA of 1  Wound has been treated with standards of care for over 4 weeks including (compression, offloading, debridements.   02/14/2 s/p excisional debridement of slough, necrotic tissue, periwound hygiene wounds improved with Affinity.  The patient was positioned to allow for optimization of imaging. The fluorescence imaging device was placed 8-12 cm from the patient and the clinical darkness required for imaging was obtained by a dark drape.  The [place] i:X fluorescence imaging device was used to report presence and location of red or cyan fluorescence, indicative of bacteria at loads greater than 104 CFU/g in real-time. Images reported to have ---no/red/cyan----fluorescence. The wound was ----mechanically cleansed with Dakins 0.125%/Iodine/0.9% saline) and/or underwent excisional debridement.  Fluorescence images acquired after cleansing demonstrated reduction in bacteria. No sign of infection. Patient does not smoke. Affinity #2 applied today to patients LLE Wounds bed debrided of bioburden and prepped for product application. (1 pieces cut in two and 7 square cm) obtained. Total product usage was (7 sq. cm), Total waste (0 sq cm) due to wound size. Product secured with (sterile strips and bolster dressing). Maintain wound veil in place until next visit. Keep area dry. Recommend continuation of compression stocking to the RLE   02/28/2024 The patient is seen for follow-up for  LLE  wounds. States she is wearing compression garment. affinity was applied on the last visit On exam: Wounds have red granulation tissue, layer of yellow slough, wound edges macerated, periwound skin intact. s/p excisional debridement Plan for re application of (affinity). Wound has shown improvement through (increased granulation and/or decreased size). Wound is free from infection, drainage and necrotic tissue. (Patient has adequate blood supply as demonstrated by palpable pulses and an GALA of (1.1) from (date). Patient is on a comprehensive diabetic management program with a  Wound has been treated with standards of care for (4) weeks including (compression, offloading, debridements). (skin substitute) number () applied today to patients (anatomical site).   Wound bed debrided of bioburden and prepped for product application.  (# of pieces and original size of product) obtained.  Total product usage was (x sq. cm), Total waste (x sq. cm) due to wound size.  Product secured with (steri strips and bolster dressing). Patient to f/u in 1 week. 3/20/24 Affinity number 1 applied today to patients left lateral calf.   Wound bed debrided of bioburden and prepped for product application.  (# of pieces and original size of product) obtained.  Total product usage was 2 sq. cm), Total waste 5 sq. cm) due to wound size.  Product secured with (steri strips and bolster dressing). Patient to f/u in 1 week. s/p excisional debridement right leg mildly cellulitic - slight warm to touch, no pain

## 2024-03-27 NOTE — PLAN
[FreeTextEntry1] : 9/18/23 Plan - shower wound, scrub misael wound skin well as this area positive on moleculight use pump daily, 45 minutes can c/w ioplex, will ask nurse to get acticoat, needs to wipe down leg and wound with vashe before applying products xtrasorb/dynaflex follow up 2-3 weeks  10/6/23 Plan: Moistened Ioplex with water to remove if it adheres to the wound bed. Plan - shower wound, scrub misael wound skin well  use pump daily, 45 minutes WAsh wound with Vashe, apply Zinc oxide to the misael wound Ioplex, xtrasorb/dynaflex follow up 2-3 weeks Nursing orders given. 11/22/23 Plan - orders for nurse c/w ioplex/super absorber/dynaflex follow up 2-3 weeks 12/13/23 Recommended  using compression garment on the RLE Continue compression garment on the LLE. Repeat vascular studies. Ioplex to LLE wounds. Compression wrap to LLE. The patient was positioned as follows .  The fluorescence imaging device was positioned 8-12 cm from the patient and the clinical darkness required for imaging was obtained.  The wound measured by [employer]  The [place] i:X fluorescence imaging device was used to report presence and location of red or cyan fluorescence, indicative of bacteria at loads greater than 104 CFU/g in real-time.  Images reported to be positive mechanical washed.  Due to presence of infection causing bacteria the wound was cleansed.  Fluorescence images acquired after cleansing reported no reduction in bacteria..  I then turned my attention to debridement, resulting in decrease in the fluorescence image. measured for compression grment vascular test to be scheduled  1/10/24 Plan: Maintain wound veil in place. Do not remove wound veil Keep area dry Nursing orders given Patient measured for Circaids on the next visit. RTO in two weeks   1/14/24 Plan - veil to be left in place, change above follow up in 2 weeks nursing orders given  02/28/2024 isabel, xtrasorb, multilayer dressing follow up in 2-3 weeks nursing orders given Affinity will be ordered 3/20/24 Plan - skin sub. applied/veil over/dynaflex follow up 2 weeks orders for nurse keflex to pharmacy for right leg

## 2024-03-27 NOTE — PHYSICAL EXAM
[Normal Rate and Rhythm] : normal rate and rhythm [Please See PDF for [employer]] : Please See PDF for [employer]. [de-identified] : seated at table at home, good hygiene [de-identified] : camila [de-identified] : non labored [de-identified] : limited ambulation [de-identified] : awake, alert , conversant

## 2024-03-28 PROBLEM — S80.12XD LEG HEMATOMA, LEFT, SUBSEQUENT ENCOUNTER: Status: ACTIVE | Noted: 2021-07-15

## 2024-03-28 NOTE — PHYSICAL EXAM
[Normal Rate and Rhythm] : normal rate and rhythm [Please See PDF for [employer]] : Please See PDF for [employer]. [de-identified] : seated at table at home, good hygiene [de-identified] : camila [de-identified] : non labored [de-identified] : limited ambulation [de-identified] : awake, alert , conversant

## 2024-03-28 NOTE — ASSESSMENT
[FreeTextEntry1] : Assessment Chronic ulcer of other specified site (707.8) (L98.499) Chronic venous hypertension (idiopathic) with ulcer of left lower extremity (459.31) (I87.312,L97.929) Edema of both feet (782.3) (R60.0) 75 yr old woman with left leg venous ulcer , afib, HTN lymphedema  follow up after ablation done 1/26/21. for lt leg  data complex - mod lab,xr reviewed risk- surgery mod on ac, tolerated debridment well, improved pain 9/22/21 right leg was edematous last week so unna was applied, no wounds needs new compression garment left leg has been using ioplex s/p excisional debridement re-measured for compression garment 10/20/21 Wound being treated with ioplex, s/p excisional debridement slight periwound maceration noted 11/10/21 Left leg venous ulcer, wound decrease in size Patient has been using ioplex s/p excisional debridement periwound maceration /The patient was positioned to allow for optimization of imaging. The fluorescence imaging device was placed 8-12 cm from the patient and the clinical darkness required for imaging was obtained by a dark drape. The wound measured () on the (). The [email or web address] i:X fluorescence imaging device was used to report presence and location of cyan fluorescence, indicative of bacteria at loads greater than 104 CFU/g in real-time. Images reported to have cyan fluorescence. The wound was mechanically cleansed with Dakins 0.125% and underwent excisional debridement. Fluorescence images acquired after cleansing demonstrated reduction in bacteria.  1/14/22 wound clean and pink, size has hit a plateau according to visiting nurse measurements has been using ioplex 4/6/22 Apligraf number 2 applied today to patient's left leg. Wound bed debrided of bioburden and prepped for product application. 1 of pieces and original size of product) obtained. Total product usage was 7.7 sq. cm), Total waste 36.3sq. cm) due to wound size. Product secured with (steri strips and bolster dressing). Patient to f/u in 1 week.  The patient was positioned to allow for optimization of imaging. The fluorescence imaging device was placed 8-12 cm from the patient and the clinical darkness required for imaging was obtained by a dark drape. The wound measured ----- cm2 on the /left ---leg. The [employer] i:X fluorescence imaging device was used to report presence and location of red or cyan fluorescence, indicative of bacteria at loads greater than 104 CFU/g in real-time. s/p excisional debridement 5/16/22 wound clean, using isabel, distal 1/3 of wound is starting to pull into center of wound 5/23/22 apligraf number 3 applied today to patient's left leg. Wound bed debrided of bioburden and prepped for product application. 1 of pieces and original size of product) obtained. Total product usage was 12 sq. cm), Total waste 32 sq. cm) due to wound size. Product secured with (steri strips and bolster dressing). Patient to f/u in 1 week. The patient was positioned to allow for optimization of imaging. The fluorescence imaging device was placed 8-12 cm from the patient and the clinical darkness required for imaging was obtained by a dark drape. The wound measured TA on the /left ---leg). The [employer] i:X fluorescence imaging device was used to report presence and location of red fluorescence, indicative of bacteria at loads greater than 104 CFU/g in real-time. Images reported to have ---no/red/cyan----fluorescence. The wound was ----mechanically cleansed with vashe and/or underwent excisional debridement. Fluorescence images acquired after cleansing demonstrated reduction in bacteria. 8/1/22 USING PUMP WEARING COMPRESSION RIGHT LEG apligraf number () applied today to patient's (anatomical site). Wound bed debrided of bioburden and prepped for product application. (# of pieces and original size of product) obtained. Total product usage was (x sq. cm), Total waste (x sq. cm) due to wound size. Product secured with (steri strips and bolster dressing). Patient to f/u in 1 week.  11/16/22 puraply number 1 applied today to patient's left leg. Wound bed debrided of bioburden and prepped for product application. 1 of pieces and original size of product) obtained. Total product usage was 9 sq. cm), Total waste 6 due to wound size. Product secured with (steri strips and bolster dressing). Patient to f/u in 1 week. s/p excisional debridement  12/9/22 right leg, s/p evacuation hematoma and underlying venous insufficiency, has puraply application 2 weeks ago, at 6 0'clock island of skin and also in center of wound noted   12/28/2022 puraply number 2 applied today to patient's left leg. Wound bed debrided of bioburden and prepped for product application. 1 of pieces and original size of product) obtained. Total product usage was 9 sq. cm), Total waste 6 due to wound size. Product secured with (steri strips and bolster dressing). Patient to f/u in 1 week. s/p excisional debridement No clinical sign of infection The patient was positioned as follows. The fluorescence imaging device was positioned 8-12 cm from the patient and the clinical darkness required for imaging was obtained. The wound measured by [employer].. The [email or web address] i:X fluorescence imaging device was used to report presence and location of red or cyan fluorescence, indicative of bacteria at loads greater than 104 CFU/g in real-time. Images reported to be negative. Due to presence of infection causing bacteria the wound was cleansed.e. 2/15/23 puraply number 1 applied today to patient's left leg. Wound bed debrided of bioburden and prepped for product application. 1 of pieces and original size of product obtained. Total product usage was 7.7 sq. cm), Total waste 2.3 sq. cm) due to wound size. Product secured with (steri strips and bolster dressing). Patient to f/u in 1 week.  7/5/23 Wound Assessment and Plan: The patient presents with a wound to the LLE. Patient is s/p vascular procedures. Patient dressing wounds with Ioplex.Swelling noted to bilateral extremities. On exam: LLE Two wounds present Wound beds are pink Periwound maceration s/p mechanical debridement RLE Leg swelling no open wounds blanchable erythema  The patient was positioned to allow for optimization of imaging. The fluorescence imaging device was placed 8-12 cm from the patient and the clinical darkness required for imaging was obtained by a dark drape. The wound measured by TA. The [email or web address] i:X fluorescence imaging device was used to report presence and location of cyan/red fluorescence, indicative of bacteria at loads greater than 104 CFU/g in real-time. Images reported to have cyan/red fluorescence. The wound was mechanically cleansed with Vashe and underwent mechanical debridement. Fluorescence images acquired after cleansing demonstrated reduction in bacteria. No clinical sign of infection  Wash wound with Dove skin sensitive soap and clean water LLE Applied Ioplex/Xtrasorb/Multilayer compression dressings RLE multilayer compression dressing to reduce swelling Change dressing 3 times per week. Leg elevation as tolerated Encouraged ambulation or exercise. Optimization of nutrition. Offloading to the wound site. Home care orders in place Follow up appointment scheduled for 2-3 weeks 9/18/23 left calf 2 wounds, drainage less The patient was positioned to allow for optimization of imaging. The fluorescence imaging device was placed 8-12 cm from the patient and the clinical darkness required for imaging was obtained by a dark drape. The wound measured ----- cm2 on the /left ---leg/foot). The [email or web address] i:X fluorescence imaging device was used to report presence and location of red and cyan fluorescence, indicative of bacteria at loads greater than 104 CFU/g in real-time. Images reported to have ---no/red & cyan----fluorescence. The wound was ----mechanically cleansed with vashe and/or underwent excisional debridement. Fluorescence images acquired after cleansing demonstrated reduction in bacteria. s/p excisional debridement has been using ioplex not using pump consistently s/p Left RFA  10/9/23 Patient presents for follow up of LLE wounds accompanied by her family memeber. On exam: Wound with red wound bed Periwound slightly macerated s/p mechanical debridement Today applied Zinc to the periwound Aquacel/Superabsorber to the wound bed K2 multilayer dressing HHA to moistened Ioplex before removing to avoid bleeding from the wound bed.  11/22/23 left leg wound superficial, edges with crusting, s/p excisional debridement has been using ioplex, vashe  right leg slightly warm, edematous - will treat, denies fever, pain The patient was positioned to allow for optimization of imaging. The fluorescence imaging device was placed 8-12 cm from the patient and the clinical darkness required for imaging was obtained by a dark drape. The wound measured   ----- cm2 on the /left ---leg. The [employer] i:X fluorescence imaging device was used to report presence and location of cyan fluorescence, indicative of bacteria at loads greater than 104 CFU/g in real-time. Images reported to have ---no cyan----fluorescence. The wound was ----mechanically cleansed with Dakins 0.125%/Iodine/0.9% saline) and/or underwent excisional debridement.  Fluorescence images acquired after cleansing demonstrated reduction in bacteria.   12/13/23 The patient is seen for follow-up for  LLE  wounds. States she is wearing compression garment. The RLE is swollen- doesn't wear stocking on that side due to inability to put the garment on.  1/10/24    Patient presents with her son for follow up of LLE wound. Patient wearing compression on the RLE and wrapped on the LLE. Patient had US testing performed today on b/l lower extremities. Results of testing discussed with patient and son.  On exam: Wounds have red granulation tissue, layer of yellow slough, wound edges macerated, periwound skin intact. s/p excisional debridement The patient was positioned to allow for optimization of imaging. The fluorescence imaging device was placed 8-12 cm from the patient and the clinical darkness required for imaging was obtained by a dark drape. The wound measured TA. The [employer] i:X fluorescence imaging device was used to report presence and location of cyan fluorescence, indicative of bacteria at loads greater than 104 CFU/g in real-time. Images reported to have cyan fluorescence. The wound was mechanically cleansed with Vashe and underwent excisional debridement. Fluorescence images acquired after cleansing demonstrated reduction in bacteria.  Affinity #1 applied today to patients LLE Wounds bed debrided of bioburden and prepped for product application. (1 pieces cut in two and 7 square cm) obtained. Total product usage was (2.96 sq. cm), Total waste (4.04 sq cm) due to wound size. Product secured with (sterile strips and bolster dressing). Maintain wound veil in place until next visit. Keep area dry. Patient has homecare. Has been dressing wound with Ioplex/Aquacel/multilayer dressing. Has a Lymphedema pump , not using daily. Patient to f/u in 2 weeks. 1/24/24 left leg - skin subsitute applied last week, still present on wound bed

## 2024-03-28 NOTE — PLAN
[FreeTextEntry1] : 9/18/23 Plan - shower wound, scrub misael wound skin well as this area positive on moleculight use pump daily, 45 minutes can c/w ioplex, will ask nurse to get acticoat, needs to wipe down leg and wound with vashe before applying products xtrasorb/dynaflex follow up 2-3 weeks  10/6/23 Plan: Moistened Ioplex with water to remove if it adheres to the wound bed. Plan - shower wound, scrub misael wound skin well  use pump daily, 45 minutes WAsh wound with Vashe, apply Zinc oxide to the misael wound Ioplex, xtrasorb/dynaflex follow up 2-3 weeks Nursing orders given. 11/22/23 Plan - orders for nurse c/w ioplex/super absorber/dynaflex follow up 2-3 weeks 12/13/23 Recommended  using compression garment on the RLE Continue compression garment on the LLE. Repeat vascular studies. Ioplex to LLE wounds. Compression wrap to LLE. The patient was positioned as follows .  The fluorescence imaging device was positioned 8-12 cm from the patient and the clinical darkness required for imaging was obtained.  The wound measured by Tissue [employer]..  The [employer] i:X fluorescence imaging device was used to report presence and location of red or cyan fluorescence, indicative of bacteria at loads greater than 104 CFU/g in real-time.  Images reported to be positive mechanical washed.  Due to presence of infection causing bacteria the wound was cleansed.  Fluorescence images acquired after cleansing reported no reduction in bacteria..  I then turned my attention to debridement, resulting in decrease in the fluorescence image. measured for compression grment vascular test to be scheduled  1/10/24 Plan: Maintain wound veil in place. Do not remove wound veil Keep area dry Nursing orders given Patient measured for Circaids on the next visit. RTO in two weeks 1/24/24 Plan - veil to be left in place, change above follow up next week

## 2024-04-02 DIAGNOSIS — S81.802A UNSPECIFIED OPEN WOUND, LEFT LOWER LEG, INITIAL ENCOUNTER: ICD-10-CM

## 2024-04-02 DIAGNOSIS — I83.893 VARICOSE VEINS OF BILATERAL LOWER EXTREMITIES WITH OTHER COMPLICATIONS: ICD-10-CM
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
## 2024-04-02 DIAGNOSIS — I89.0 LYMPHEDEMA, NOT ELSEWHERE CLASSIFIED: ICD-10-CM
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
## 2024-04-10 ENCOUNTER — APPOINTMENT (OUTPATIENT)
Dept: WOUND CARE | Facility: HOSPITAL | Age: 79
End: 2024-04-10
Payer: MEDICARE

## 2024-04-10 ENCOUNTER — OUTPATIENT (OUTPATIENT)
Dept: OUTPATIENT SERVICES | Facility: HOSPITAL | Age: 79
LOS: 1 days | End: 2024-04-10
Payer: MEDICARE

## 2024-04-10 VITALS
RESPIRATION RATE: 18 BRPM | TEMPERATURE: 98.1 F | HEIGHT: 63 IN | BODY MASS INDEX: 35.08 KG/M2 | OXYGEN SATURATION: 93 % | DIASTOLIC BLOOD PRESSURE: 75 MMHG | SYSTOLIC BLOOD PRESSURE: 117 MMHG | HEART RATE: 73 BPM | WEIGHT: 198 LBS

## 2024-04-10 PROCEDURE — 29581 APPL MULTLAYER CMPRN SYS LEG: CPT | Mod: LT

## 2024-04-12 NOTE — PHYSICAL EXAM
[Normal Rate and Rhythm] : normal rate and rhythm [Please See PDF for [employer]] : Please See PDF for [employer]. [de-identified] : seated at table at home, good hygiene [de-identified] : camila [de-identified] : non labored [de-identified] : limited ambulation [de-identified] : awake, alert , conversant

## 2024-04-12 NOTE — ASSESSMENT
[FreeTextEntry1] : Assessment Chronic ulcer of other specified site (707.8) (L98.499) Chronic venous hypertension (idiopathic) with ulcer of left lower extremity (459.31) (I87.312,L97.929) Edema of both feet (782.3) (R60.0) 75 yr old woman with left leg venous ulcer , afib, HTN lymphedema  follow up after ablation done 1/26/21. for lt leg  data complex - mod lab,xr reviewed risk- surgery mod on ac, tolerated debridment well, improved pain 9/22/21 right leg was edematous last week so unna was applied, no wounds needs new compression garment left leg has been using ioplex s/p excisional debridement re-measured for compression garment 10/20/21 Wound being treated with ioplex, s/p excisional debridement slight periwound maceration noted 11/10/21 Left leg venous ulcer, wound decrease in size Patient has been using ioplex s/p excisional debridement periwound maceration /The patient was positioned to allow for optimization of imaging. The fluorescence imaging device was placed 8-12 cm from the patient and the clinical darkness required for imaging was obtained by a dark drape. The wound measured () on the (). The ClarityAd i:X fluorescence imaging device was used to report presence and location of cyan fluorescence, indicative of bacteria at loads greater than 104 CFU/g in real-time. Images reported to have cyan fluorescence. The wound was mechanically cleansed with Dakins 0.125% and underwent excisional debridement. Fluorescence images acquired after cleansing demonstrated reduction in bacteria.  1/14/22 wound clean and pink, size has hit a plateau according to visiting nurse measurements has been using ioplex 4/6/22 Apligraf number 2 applied today to patient's left leg. Wound bed debrided of bioburden and prepped for product application. 1 of pieces and original size of product) obtained. Total product usage was 7.7 sq. cm), Total waste 36.3sq. cm) due to wound size. Product secured with (steri strips and bolster dressing). Patient to f/u in 1 week.  The patient was positioned to allow for optimization of imaging. The fluorescence imaging device was placed 8-12 cm from the patient and the clinical darkness required for imaging was obtained by a dark drape. The wound measured ----- cm2 on the /left ---leg. The Netcipiat i:X fluorescence imaging device was used to report presence and location of red or cyan fluorescence, indicative of bacteria at loads greater than 104 CFU/g in real-time. s/p excisional debridement 5/16/22 wound clean, using isabel, distal 1/3 of wound is starting to pull into center of wound 5/23/22 apligraf number 3 applied today to patient's left leg. Wound bed debrided of bioburden and prepped for product application. 1 of pieces and original size of product) obtained. Total product usage was 12 sq. cm), Total waste 32 sq. cm) due to wound size. Product secured with (steri strips and bolster dressing). Patient to f/u in 1 week. The patient was positioned to allow for optimization of imaging. The fluorescence imaging device was placed 8-12 cm from the patient and the clinical darkness required for imaging was obtained by a dark drape. The wound measured TA on the /left ---leg). The Netcipiat i:X fluorescence imaging device was used to report presence and location of red fluorescence, indicative of bacteria at loads greater than 104 CFU/g in real-time. Images reported to have ---no/red/cyan----fluorescence. The wound was ----mechanically cleansed with vashe and/or underwent excisional debridement. Fluorescence images acquired after cleansing demonstrated reduction in bacteria. 8/1/22 USING PUMP WEARING COMPRESSION RIGHT LEG apligraf number () applied today to patient's (anatomical site). Wound bed debrided of bioburden and prepped for product application. (# of pieces and original size of product) obtained. Total product usage was (x sq. cm), Total waste (x sq. cm) due to wound size. Product secured with (steri strips and bolster dressing). Patient to f/u in 1 week.  11/16/22 puraply number 1 applied today to patient's left leg. Wound bed debrided of bioburden and prepped for product application. 1 of pieces and original size of product) obtained. Total product usage was 9 sq. cm), Total waste 6 due to wound size. Product secured with (steri strips and bolster dressing). Patient to f/u in 1 week. s/p excisional debridement  12/9/22 right leg, s/p evacuation hematoma and underlying venous insufficiency, has puraply application 2 weeks ago, at 6 0'clock island of skin and also in center of wound noted   12/28/2022 puraply number 2 applied today to patient's left leg. Wound bed debrided of bioburden and prepped for product application. 1 of pieces and original size of product) obtained. Total product usage was 9 sq. cm), Total waste 6 due to wound size. Product secured with (steri strips and bolster dressing). Patient to f/u in 1 week. s/p excisional debridement No clinical sign of infection The patient was positioned as follows. The fluorescence imaging device was positioned 8-12 cm from the patient and the clinical darkness required for imaging was obtained. The wound measured by [employer] The ClarityAd i:X fluorescence imaging device was used to report presence and location of red or cyan fluorescence, indicative of bacteria at loads greater than 104 CFU/g in real-time. Images reported to be negative. Due to presence of infection causing bacteria the wound was cleansed.e. 2/15/23 puraply number 1 applied today to patient's left leg. Wound bed debrided of bioburden and prepped for product application. 1 of pieces and original size of product obtained. Total product usage was 7.7 sq. cm), Total waste 2.3 sq. cm) due to wound size. Product secured with (steri strips and bolster dressing). Patient to f/u in 1 week.  7/5/23 Wound Assessment and Plan: The patient presents with a wound to the LLE. Patient is s/p vascular procedures. Patient dressing wounds with Ioplex.Swelling noted to bilateral extremities. On exam: LLE Two wounds present Wound beds are pink Periwound maceration s/p mechanical debridement RLE Leg swelling no open wounds blanchable erythema  The patient was positioned to allow for optimization of imaging. The fluorescence imaging device was placed 8-12 cm from the patient and the clinical darkness required for imaging was obtained by a dark drape. The wound measured by TA. The ClarityAd i:X fluorescence imaging device was used to report presence and location of cyan/red fluorescence, indicative of bacteria at loads greater than 104 CFU/g in real-time. Images reported to have cyan/red fluorescence. The wound was mechanically cleansed with Vashe and underwent mechanical debridement. Fluorescence images acquired after cleansing demonstrated reduction in bacteria. No clinical sign of infection  Wash wound with Dove skin sensitive soap and clean water LLE Applied Ioplex/Xtrasorb/Multilayer compression dressings RLE multilayer compression dressing to reduce swelling Change dressing 3 times per week. Leg elevation as tolerated Encouraged ambulation or exercise. Optimization of nutrition. Offloading to the wound site. Home care orders in place Follow up appointment scheduled for 2-3 weeks 9/18/23 left calf 2 wounds, drainage less The patient was positioned to allow for optimization of imaging. The fluorescence imaging device was placed 8-12 cm from the patient and the clinical darkness required for imaging was obtained by a dark drape. The wound measured ----- cm2 on the /left ---leg/foot). The ClarityAd i:X fluorescence imaging device was used to report presence and location of red and cyan fluorescence, indicative of bacteria at loads greater than 104 CFU/g in real-time. Images reported to have ---no/red & cyan----fluorescence. The wound was ----mechanically cleansed with vashe and/or underwent excisional debridement. Fluorescence images acquired after cleansing demonstrated reduction in bacteria. s/p excisional debridement has been using ioplex not using pump consistently s/p Left RFA  10/9/23 Patient presents for follow up of LLE wounds accompanied by her family memeber. On exam: Wound with red wound bed Periwound slightly macerated s/p mechanical debridement Today applied Zinc to the periwound Aquacel/Superabsorber to the wound bed K2 multilayer dressing HHA to moistened Ioplex before removing to avoid bleeding from the wound bed.  11/22/23 left leg wound superficial, edges with crusting, s/p excisional debridement has been using ioplex, vashe  right leg slightly warm, edematous - will treat, denies fever, pain The patient was positioned to allow for optimization of imaging. The fluorescence imaging device was placed 8-12 cm from the patient and the clinical darkness required for imaging was obtained by a dark drape. The wound measured   ----- cm2 on the /left ---leg. The Netcipiat i:X fluorescence imaging device was used to report presence and location of cyan fluorescence, indicative of bacteria at loads greater than 104 CFU/g in real-time. Images reported to have ---no cyan----fluorescence. The wound was ----mechanically cleansed with Dakins 0.125%/Iodine/0.9% saline) and/or underwent excisional debridement.  Fluorescence images acquired after cleansing demonstrated reduction in bacteria.   12/13/23 The patient is seen for follow-up for  LLE  wounds. States she is wearing compression garment. The RLE is swollen- doesn't wear stocking on that side due to inability to put the garment on.  1/10/24    Patient presents with her son for follow up of LLE wound. Patient wearing compression on the RLE and wrapped on the LLE. Patient had US testing performed today on b/l lower extremities. Results of testing discussed with patient and son.  On exam: Wounds have red granulation tissue, layer of yellow slough, wound edges macerated, periwound skin intact. s/p excisional debridement The patient was positioned to allow for optimization of imaging. The fluorescence imaging device was placed 8-12 cm from the patient and the clinical darkness required for imaging was obtained by a dark drape. The wound measured TA. The Netcipiat i:X fluorescence imaging device was used to report presence and location of cyan fluorescence, indicative of bacteria at loads greater than 104 CFU/g in real-time. Images reported to have cyan fluorescence. The wound was mechanically cleansed with Vashe and underwent excisional debridement. Fluorescence images acquired after cleansing demonstrated reduction in bacteria.  Affinity #1 applied today to patients LLE Wounds bed debrided of bioburden and prepped for product application. (1 pieces cut in two and 7 square cm) obtained. Total product usage was (2.96 sq. cm), Total waste (4.04 sq cm) due to wound size. Product secured with (sterile strips and bolster dressing). Maintain wound veil in place until next visit. Keep area dry. Patient has homecare. Has been dressing wound with Ioplex/Aquacel/multilayer dressing. Has a Lymphedema pump , not using daily. Patient to f/u in 2 weeks. 1/24/24 left leg - skin subsitute applied last week, still present on wound bed 1/31/24 s/p excisional debridement of slough, necrotic tissue, periwound hygiene wounds improved with Affinity. Recommend continuation of compression stocking to the RLE Plan for re application of Affinity.  Wound has shown improvement through increased granulation and decreased size)  Wound is free from infection, drainage and necrotic tissue.  (Patient has adequate blood supply as demonstrated by palpable pulses and an GALA of 1  Wound has been treated with standards of care for over 4 weeks including (compression, offloading, debridements.   02/14/2 s/p excisional debridement of slough, necrotic tissue, periwound hygiene wounds improved with Affinity.  The patient was positioned to allow for optimization of imaging. The fluorescence imaging device was placed 8-12 cm from the patient and the clinical darkness required for imaging was obtained by a dark drape.  The ClarityAd i:X fluorescence imaging device was used to report presence and location of red or cyan fluorescence, indicative of bacteria at loads greater than 104 CFU/g in real-time. Images reported to have ---no/red/cyan----fluorescence. The wound was ----mechanically cleansed with Dakins 0.125%/Iodine/0.9% saline) and/or underwent excisional debridement.  Fluorescence images acquired after cleansing demonstrated reduction in bacteria. No sign of infection. Patient does not smoke. Affinity #2 applied today to patients LLE Wounds bed debrided of bioburden and prepped for product application. (1 pieces cut in two and 7 square cm) obtained. Total product usage was (7 sq. cm), Total waste (0 sq cm) due to wound size. Product secured with (sterile strips and bolster dressing). Maintain wound veil in place until next visit. Keep area dry. Recommend continuation of compression stocking to the RLE   02/28/2024 The patient is seen for follow-up for  LLE  wounds. States she is wearing compression garment. affinity was applied on the last visit On exam: Wounds have red granulation tissue, layer of yellow slough, wound edges macerated, periwound skin intact. s/p excisional debridement Plan for re application of (affinity). Wound has shown improvement through (increased granulation and/or decreased size). Wound is free from infection, drainage and necrotic tissue. (Patient has adequate blood supply as demonstrated by palpable pulses and an GALA of (1.1) from (date). Patient is on a comprehensive diabetic management program with a  Wound has been treated with standards of care for (4) weeks including (compression, offloading, debridements). (skin substitute) number () applied today to patients (anatomical site).   Wound bed debrided of bioburden and prepped for product application.  (# of pieces and original size of product) obtained.  Total product usage was (x sq. cm), Total waste (x sq. cm) due to wound size.  Product secured with (steri strips and bolster dressing). Patient to f/u in 1 week. 3/20/24 Affinity number 1 applied today to patients left lateral calf.   Wound bed debrided of bioburden and prepped for product application.  (# of pieces and original size of product) obtained.  Total product usage was 2 sq. cm), Total waste 5 sq. cm) due to wound size.  Product secured with (steri strips and bolster dressing). Patient to f/u in 1 week. s/p excisional debridement right leg mildly cellulitic - slight warm to touch, no pain 4/10/24 left lateral calf wound had affinity placed 3 weeks ago, wound smaller  mechanical debridement

## 2024-04-12 NOTE — PLAN
[FreeTextEntry1] : 9/18/23 Plan - shower wound, scrub misael wound skin well as this area positive on moleculight use pump daily, 45 minutes can c/w ioplex, will ask nurse to get acticoat, needs to wipe down leg and wound with vashe before applying products xtrasorb/dynaflex follow up 2-3 weeks  10/6/23 Plan: Moistened Ioplex with water to remove if it adheres to the wound bed. Plan - shower wound, scrub misael wound skin well  use pump daily, 45 minutes WAsh wound with Vashe, apply Zinc oxide to the misael wound Ioplex, xtrasorb/dynaflex follow up 2-3 weeks Nursing orders given. 11/22/23 Plan - orders for nurse c/w ioplex/super absorber/dynaflex follow up 2-3 weeks 12/13/23 Recommended  using compression garment on the RLE Continue compression garment on the LLE. Repeat vascular studies. Ioplex to LLE wounds. Compression wrap to LLE. The patient was positioned as follows .  The fluorescence imaging device was positioned 8-12 cm from the patient and the clinical darkness required for imaging was obtained.  The wound measured by [employer]  The Eco-Vacay i:X fluorescence imaging device was used to report presence and location of red or cyan fluorescence, indicative of bacteria at loads greater than 104 CFU/g in real-time.  Images reported to be positive mechanical washed.  Due to presence of infection causing bacteria the wound was cleansed.  Fluorescence images acquired after cleansing reported no reduction in bacteria..  I then turned my attention to debridement, resulting in decrease in the fluorescence image. measured for compression grment vascular test to be scheduled  1/10/24 Plan: Maintain wound veil in place. Do not remove wound veil Keep area dry Nursing orders given Patient measured for Circaids on the next visit. RTO in two weeks   1/14/24 Plan - veil to be left in place, change above follow up in 2 weeks nursing orders given  02/28/2024 isabel, xtrasorb, multilayer dressing follow up in 2-3 weeks nursing orders given Affinity will be ordered 3/20/24 Plan - skin sub. applied/veil over/dynaflex follow up 2 weeks orders for nurse keflex to pharmacy for right leg 4/10/24 Plan - order skin sub. orders for nurse  resume showering, isabel/aquacel/dynaflex follow up 3 weeks

## 2024-04-23 NOTE — DATA REVIEWED
[FreeTextEntry1] : 3/19skin substitute number primatrix) applied today to patient's (anatomical site left leg ulcer). \par  wound bed debrided of bioburded and prepped for product application. (1# of pieces and 6x6 original size of product) obtained. Total product usage was (36 sq. cm), Total waste (0x sq. cm) due to wound size.\par   Product secured with (steri strips and bolster dressing).\par  also placed on 2/25
No

## 2024-04-25 DIAGNOSIS — I89.0 LYMPHEDEMA, NOT ELSEWHERE CLASSIFIED: ICD-10-CM

## 2024-04-25 DIAGNOSIS — S81.802A UNSPECIFIED OPEN WOUND, LEFT LOWER LEG, INITIAL ENCOUNTER: ICD-10-CM
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
## 2024-04-25 DIAGNOSIS — S80.12XD CONTUSION OF LEFT LOWER LEG, SUBSEQUENT ENCOUNTER: ICD-10-CM
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
## 2024-04-29 NOTE — PLAN
Thought Content: Thought content normal.         Judgment: Judgment normal.       /82   Pulse 98   Wt 59.7 kg (131 lb 9.6 oz)   SpO2 93%   BMI 25.70 kg/m²     Assessment:       Diagnosis Orders   1. Type 2 diabetes mellitus without complication, without long-term current use of insulin (HCC)  POCT glycosylated hemoglobin (Hb A1C)    empagliflozin (JARDIANCE) 10 MG tablet      2. Aortoiliac occlusive disease (HCC)        3. Renal artery stenosis (HCC)        4. Primary hypertension        5. PAD (peripheral artery disease) ([facility])        6. Generalized postprandial abdominal pain        7. Chronic right shoulder pain        8. Adult hypertrophic pyloric stenosis        9. Smoking trying to quit                  Plan:   Assessment & Plan   Return in about 3 months (around 7/29/2024) for diabetes check.    Diabetes-A1c has further elevated despite reported efforts with diet, she is advised to stop all soda.  She is willing to start medication but is not going to tolerate metformin due to her significant GI history.  Will start Jardiance if affordable.  She is urged to contact office if unable to obtain from pharmacy and will choose alternative  Aorto occlusive disease, renal artery stenosis, PAD-has been stable on Plavix and statin, she will follow-up with vascular on annual basis and as needed  Hypertension-well-controlled on amlodipine and lisinopril  Postprandial abdominal pain, shoulder pain-she continues to Percocet 2-3 times daily, PDMP reflects appropriate use of opioid pain reliever  Smoking trying to quit-encouraged to continue to work on full smoking cessation, discussed benefits versus risks of continuing  Hypertrophic pyloric stenosis-stable at this time, she will follow-up with gastroenterology as needed  Orders Placed This Encounter   Procedures    POCT glycosylated hemoglobin (Hb A1C)        Orders Placed This Encounter   Medications    empagliflozin (JARDIANCE) 10 MG tablet     Sig: Take 1  [FreeTextEntry1] : 8/1/22\par Plan - skin sub. in place /veil over/only change above /xtrasorb/dynaflex\par c/w pump\par orders for nurse

## 2024-05-01 ENCOUNTER — APPOINTMENT (OUTPATIENT)
Dept: WOUND CARE | Facility: HOSPITAL | Age: 79
End: 2024-05-01
Payer: MEDICARE

## 2024-05-01 ENCOUNTER — OUTPATIENT (OUTPATIENT)
Dept: OUTPATIENT SERVICES | Facility: HOSPITAL | Age: 79
LOS: 1 days | End: 2024-05-01
Payer: MEDICARE

## 2024-05-01 VITALS
TEMPERATURE: 97.9 F | RESPIRATION RATE: 18 BRPM | SYSTOLIC BLOOD PRESSURE: 130 MMHG | OXYGEN SATURATION: 90 % | DIASTOLIC BLOOD PRESSURE: 69 MMHG | HEART RATE: 81 BPM

## 2024-05-01 PROCEDURE — 11042 DBRDMT SUBQ TIS 1ST 20SQCM/<: CPT

## 2024-05-10 NOTE — PHYSICAL EXAM
[Normal Rate and Rhythm] : normal rate and rhythm [Please See PDF for [employer]] : Please See PDF for [employer]. [de-identified] : seated at table at home, good hygiene [de-identified] : camila [de-identified] : non labored [de-identified] : limited ambulation [de-identified] : awake, alert , conversant

## 2024-05-10 NOTE — ASSESSMENT
[FreeTextEntry1] : Assessment Chronic ulcer of other specified site (707.8) (L98.499) Chronic venous hypertension (idiopathic) with ulcer of left lower extremity (459.31) (I87.312,L97.929) Edema of both feet (782.3) (R60.0) 75 yr old woman with left leg venous ulcer , afib, HTN lymphedema  follow up after ablation done 1/26/21. for lt leg  data complex - mod lab,xr reviewed risk- surgery mod on ac, tolerated debridment well, improved pain 9/22/21 right leg was edematous last week so unna was applied, no wounds needs new compression garment left leg has been using ioplex s/p excisional debridement re-measured for compression garment 10/20/21 Wound being treated with ioplex, s/p excisional debridement slight periwound maceration noted 11/10/21 Left leg venous ulcer, wound decrease in size Patient has been using ioplex s/p excisional debridement periwound maceration /The patient was positioned to allow for optimization of imaging. The fluorescence imaging device was placed 8-12 cm from the patient and the clinical darkness required for imaging was obtained by a dark drape. The wound measured () on the (). The [employer] i:X fluorescence imaging device was used to report presence and location of cyan fluorescence, indicative of bacteria at loads greater than 104 CFU/g in real-time. Images reported to have cyan fluorescence. The wound was mechanically cleansed with Dakins 0.125% and underwent excisional debridement. Fluorescence images acquired after cleansing demonstrated reduction in bacteria.  1/14/22 wound clean and pink, size has hit a plateau according to visiting nurse measurements has been using ioplex 4/6/22 Apligraf number 2 applied today to patient's left leg. Wound bed debrided of bioburden and prepped for product application. 1 of pieces and original size of product) obtained. Total product usage was 7.7 sq. cm), Total waste 36.3sq. cm) due to wound size. Product secured with (steri strips and bolster dressing). Patient to f/u in 1 week.  The patient was positioned to allow for optimization of imaging. The fluorescence imaging device was placed 8-12 cm from the patient and the clinical darkness required for imaging was obtained by a dark drape. The wound measured ----- cm2 on the /left ---leg. The [employer] i:X fluorescence imaging device was used to report presence and location of red or cyan fluorescence, indicative of bacteria at loads greater than 104 CFU/g in real-time. s/p excisional debridement 5/16/22 wound clean, using isabel, distal 1/3 of wound is starting to pull into center of wound 5/23/22 apligraf number 3 applied today to patient's left leg. Wound bed debrided of bioburden and prepped for product application. 1 of pieces and original size of product) obtained. Total product usage was 12 sq. cm), Total waste 32 sq. cm) due to wound size. Product secured with (steri strips and bolster dressing). Patient to f/u in 1 week. The patient was positioned to allow for optimization of imaging. The fluorescence imaging device was placed 8-12 cm from the patient and the clinical darkness required for imaging was obtained by a dark drape. The wound measured TA on the /left ---leg). The [employer] i:X fluorescence imaging device was used to report presence and location of red fluorescence, indicative of bacteria at loads greater than 104 CFU/g in real-time. Images reported to have ---no/red/cyan----fluorescence. The wound was ----mechanically cleansed with vashe and/or underwent excisional debridement. Fluorescence images acquired after cleansing demonstrated reduction in bacteria. 8/1/22 USING PUMP WEARING COMPRESSION RIGHT LEG apligraf number () applied today to patient's (anatomical site). Wound bed debrided of bioburden and prepped for product application. (# of pieces and original size of product) obtained. Total product usage was (x sq. cm), Total waste (x sq. cm) due to wound size. Product secured with (steri strips and bolster dressing). Patient to f/u in 1 week.  11/16/22 puraply number 1 applied today to patient's left leg. Wound bed debrided of bioburden and prepped for product application. 1 of pieces and original size of product) obtained. Total product usage was 9 sq. cm), Total waste 6 due to wound size. Product secured with (steri strips and bolster dressing). Patient to f/u in 1 week. s/p excisional debridement  12/9/22 right leg, s/p evacuation hematoma and underlying venous insufficiency, has puraply application 2 weeks ago, at 6 0'clock island of skin and also in center of wound noted   12/28/2022 puraply number 2 applied today to patient's left leg. Wound bed debrided of bioburden and prepped for product application. 1 of pieces and original size of product) obtained. Total product usage was 9 sq. cm), Total waste 6 due to wound size. Product secured with (steri strips and bolster dressing). Patient to f/u in 1 week. s/p excisional debridement No clinical sign of infection The patient was positioned as follows. The fluorescence imaging device was positioned 8-12 cm from the patient and the clinical darkness required for imaging was obtained. The wound measured by Tissue [employer] The [employer] i:X fluorescence imaging device was used to report presence and location of red or cyan fluorescence, indicative of bacteria at loads greater than 104 CFU/g in real-time. Images reported to be negative. Due to presence of infection causing bacteria the wound was cleansed.e. 2/15/23 puraply number 1 applied today to patient's left leg. Wound bed debrided of bioburden and prepped for product application. 1 of pieces and original size of product obtained. Total product usage was 7.7 sq. cm), Total waste 2.3 sq. cm) due to wound size. Product secured with (steri strips and bolster dressing). Patient to f/u in 1 week.  7/5/23 Wound Assessment and Plan: The patient presents with a wound to the LLE. Patient is s/p vascular procedures. Patient dressing wounds with Ioplex.Swelling noted to bilateral extremities. On exam: LLE Two wounds present Wound beds are pink Periwound maceration s/p mechanical debridement RLE Leg swelling no open wounds blanchable erythema  The patient was positioned to allow for optimization of imaging. The fluorescence imaging device was placed 8-12 cm from the patient and the clinical darkness required for imaging was obtained by a dark drape. The wound measured by TA. The [employer] i:X fluorescence imaging device was used to report presence and location of cyan/red fluorescence, indicative of bacteria at loads greater than 104 CFU/g in real-time. Images reported to have cyan/red fluorescence. The wound was mechanically cleansed with Vashe and underwent mechanical debridement. Fluorescence images acquired after cleansing demonstrated reduction in bacteria. No clinical sign of infection  Wash wound with Dove skin sensitive soap and clean water LLE Applied Ioplex/Xtrasorb/Multilayer compression dressings RLE multilayer compression dressing to reduce swelling Change dressing 3 times per week. Leg elevation as tolerated Encouraged ambulation or exercise. Optimization of nutrition. Offloading to the wound site. Home care orders in place Follow up appointment scheduled for 2-3 weeks 9/18/23 left calf 2 wounds, drainage less The patient was positioned to allow for optimization of imaging. The fluorescence imaging device was placed 8-12 cm from the patient and the clinical darkness required for imaging was obtained by a dark drape. The wound measured ----- cm2 on the /left ---leg/foot). The [employer] i:X fluorescence imaging device was used to report presence and location of red and cyan fluorescence, indicative of bacteria at loads greater than 104 CFU/g in real-time. Images reported to have ---no/red & cyan----fluorescence. The wound was ----mechanically cleansed with vashe and/or underwent excisional debridement. Fluorescence images acquired after cleansing demonstrated reduction in bacteria. s/p excisional debridement has been using ioplex not using pump consistently s/p Left RFA  10/9/23 Patient presents for follow up of LLE wounds accompanied by her family memeber. On exam: Wound with red wound bed Periwound slightly macerated s/p mechanical debridement Today applied Zinc to the periwound Aquacel/Superabsorber to the wound bed K2 multilayer dressing HHA to moistened Ioplex before removing to avoid bleeding from the wound bed.  11/22/23 left leg wound superficial, edges with crusting, s/p excisional debridement has been using ioplex, vashe  right leg slightly warm, edematous - will treat, denies fever, pain The patient was positioned to allow for optimization of imaging. The fluorescence imaging device was placed 8-12 cm from the patient and the clinical darkness required for imaging was obtained by a dark drape. The wound measured   ----- cm2 on the /left ---leg. The [employer] i:X fluorescence imaging device was used to report presence and location of cyan fluorescence, indicative of bacteria at loads greater than 104 CFU/g in real-time. Images reported to have ---no cyan----fluorescence. The wound was ----mechanically cleansed with Dakins 0.125%/Iodine/0.9% saline) and/or underwent excisional debridement.  Fluorescence images acquired after cleansing demonstrated reduction in bacteria.   12/13/23 The patient is seen for follow-up for  LLE  wounds. States she is wearing compression garment. The RLE is swollen- doesn't wear stocking on that side due to inability to put the garment on.  1/10/24    Patient presents with her son for follow up of LLE wound. Patient wearing compression on the RLE and wrapped on the LLE. Patient had US testing performed today on b/l lower extremities. Results of testing discussed with patient and son.  On exam: Wounds have red granulation tissue, layer of yellow slough, wound edges macerated, periwound skin intact. s/p excisional debridement The patient was positioned to allow for optimization of imaging. The fluorescence imaging device was placed 8-12 cm from the patient and the clinical darkness required for imaging was obtained by a dark drape. The wound measured TA. The [employer] i:X fluorescence imaging device was used to report presence and location of cyan fluorescence, indicative of bacteria at loads greater than 104 CFU/g in real-time. Images reported to have cyan fluorescence. The wound was mechanically cleansed with Vashe and underwent excisional debridement. Fluorescence images acquired after cleansing demonstrated reduction in bacteria.  Affinity #1 applied today to patients LLE Wounds bed debrided of bioburden and prepped for product application. (1 pieces cut in two and 7 square cm) obtained. Total product usage was (2.96 sq. cm), Total waste (4.04 sq cm) due to wound size. Product secured with (sterile strips and bolster dressing). Maintain wound veil in place until next visit. Keep area dry. Patient has homecare. Has been dressing wound with Ioplex/Aquacel/multilayer dressing. Has a Lymphedema pump , not using daily. Patient to f/u in 2 weeks. 1/24/24 left leg - skin subsitute applied last week, still present on wound bed 1/31/24 s/p excisional debridement of slough, necrotic tissue, periwound hygiene wounds improved with Affinity. Recommend continuation of compression stocking to the RLE Plan for re application of Affinity.  Wound has shown improvement through increased granulation and decreased size)  Wound is free from infection, drainage and necrotic tissue.  (Patient has adequate blood supply as demonstrated by palpable pulses and an GALA of 1  Wound has been treated with standards of care for over 4 weeks including (compression, offloading, debridements.   02/14/2 s/p excisional debridement of slough, necrotic tissue, periwound hygiene wounds improved with Affinity.  The patient was positioned to allow for optimization of imaging. The fluorescence imaging device was placed 8-12 cm from the patient and the clinical darkness required for imaging was obtained by a dark drape.  The [employer] i:X fluorescence imaging device was used to report presence and location of red or cyan fluorescence, indicative of bacteria at loads greater than 104 CFU/g in real-time. Images reported to have ---no/red/cyan----fluorescence. The wound was ----mechanically cleansed with Dakins 0.125%/Iodine/0.9% saline) and/or underwent excisional debridement.  Fluorescence images acquired after cleansing demonstrated reduction in bacteria. No sign of infection. Patient does not smoke. Affinity #2 applied today to patients LLE Wounds bed debrided of bioburden and prepped for product application. (1 pieces cut in two and 7 square cm) obtained. Total product usage was (7 sq. cm), Total waste (0 sq cm) due to wound size. Product secured with (sterile strips and bolster dressing). Maintain wound veil in place until next visit. Keep area dry. Recommend continuation of compression stocking to the RLE   02/28/2024 The patient is seen for follow-up for  LLE  wounds. States she is wearing compression garment. affinity was applied on the last visit On exam: Wounds have red granulation tissue, layer of yellow slough, wound edges macerated, periwound skin intact. s/p excisional debridement Plan for re application of (affinity). Wound has shown improvement through (increased granulation and/or decreased size). Wound is free from infection, drainage and necrotic tissue. (Patient has adequate blood supply as demonstrated by palpable pulses and an GALA of (1.1) from (date). Patient is on a comprehensive diabetic management program with a  Wound has been treated with standards of care for (4) weeks including (compression, offloading, debridements). (skin substitute) number () applied today to patients (anatomical site).   Wound bed debrided of bioburden and prepped for product application.  (# of pieces and original size of product) obtained.  Total product usage was (x sq. cm), Total waste (x sq. cm) due to wound size.  Product secured with (steri strips and bolster dressing). Patient to f/u in 1 week. 3/20/24 Affinity number 1 applied today to patients left lateral calf.   Wound bed debrided of bioburden and prepped for product application.  (# of pieces and original size of product) obtained.  Total product usage was 2 sq. cm), Total waste 5 sq. cm) due to wound size.  Product secured with (steri strips and bolster dressing). Patient to f/u in 1 week. s/p excisional debridement right leg mildly cellulitic - slight warm to touch, no pain 4/10/24 left lateral calf wound had affinity placed 3 weeks ago, wound smaller  mechanical debridement  5/1/24 Patient is here for follow-up for a LLE wound. Physical Exam: Wound is clean, smaller, no need for skin substitute placement. S/P mechanical debridement. Has nurse twice a week.

## 2024-05-10 NOTE — PLAN
[FreeTextEntry1] : 9/18/23 Plan - shower wound, scrub misael wound skin well as this area positive on moleculight use pump daily, 45 minutes can c/w ioplex, will ask nurse to get acticoat, needs to wipe down leg and wound with vashe before applying products xtrasorb/dynaflex follow up 2-3 weeks  10/6/23 Plan: Moistened Ioplex with water to remove if it adheres to the wound bed. Plan - shower wound, scrub misael wound skin well  use pump daily, 45 minutes WAsh wound with Vashe, apply Zinc oxide to the misael wound Ioplex, xtrasorb/dynaflex follow up 2-3 weeks Nursing orders given. 11/22/23 Plan - orders for nurse c/w ioplex/super absorber/dynaflex follow up 2-3 weeks 12/13/23 Recommended  using compression garment on the RLE Continue compression garment on the LLE. Repeat vascular studies. Ioplex to LLE wounds. Compression wrap to LLE. The patient was positioned as follows .  The fluorescence imaging device was positioned 8-12 cm from the patient and the clinical darkness required for imaging was obtained.  The wound measured by Tissue [employer]  The [employer] i:X fluorescence imaging device was used to report presence and location of red or cyan fluorescence, indicative of bacteria at loads greater than 104 CFU/g in real-time.  Images reported to be positive mechanical washed.  Due to presence of infection causing bacteria the wound was cleansed.  Fluorescence images acquired after cleansing reported no reduction in bacteria..  I then turned my attention to debridement, resulting in decrease in the fluorescence image. measured for compression grment vascular test to be scheduled  1/10/24 Plan: Maintain wound veil in place. Do not remove wound veil Keep area dry Nursing orders given Patient measured for Circaids on the next visit. RTO in two weeks   1/14/24 Plan - veil to be left in place, change above follow up in 2 weeks nursing orders given  02/28/2024 isabel, xtrasorb, multilayer dressing follow up in 2-3 weeks nursing orders given Affinity will be ordered 3/20/24 Plan - skin sub. applied/veil over/dynaflex follow up 2 weeks orders for nurse keflex to pharmacy for right leg 4/10/24 Plan - order skin sub. orders for nurse  resume showering, isabel/aquacel/dynaflex follow up 3 weeks  5/1/24  S/P mechanical debridement. Moisturize daily. Isabel/ Aqua Cell/Dynaplex. Nurses' orders given. Follow-up 3-4 weeks.

## 2024-05-28 DIAGNOSIS — S81.802A UNSPECIFIED OPEN WOUND, LEFT LOWER LEG, INITIAL ENCOUNTER: ICD-10-CM
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
## 2024-05-28 DIAGNOSIS — I87.312 CHRONIC VENOUS HYPERTENSION (IDIOPATHIC) WITH ULCER OF LEFT LOWER EXTREMITY: ICD-10-CM

## 2024-05-28 DIAGNOSIS — I89.0 LYMPHEDEMA, NOT ELSEWHERE CLASSIFIED: ICD-10-CM

## 2024-05-28 DIAGNOSIS — L97.929 NON-PRESSURE CHRONIC ULCER OF UNSPECIFIED PART OF LEFT LOWER LEG WITH UNSPECIFIED SEVERITY: ICD-10-CM
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
## 2024-05-28 DIAGNOSIS — I83.893 VARICOSE VEINS OF BILATERAL LOWER EXTREMITIES WITH OTHER COMPLICATIONS: ICD-10-CM

## 2024-05-28 DIAGNOSIS — R60.0 LOCALIZED EDEMA: ICD-10-CM

## 2024-05-29 ENCOUNTER — APPOINTMENT (OUTPATIENT)
Dept: WOUND CARE | Facility: HOSPITAL | Age: 79
End: 2024-05-29
Payer: MEDICARE

## 2024-05-29 ENCOUNTER — OUTPATIENT (OUTPATIENT)
Dept: OUTPATIENT SERVICES | Facility: HOSPITAL | Age: 79
LOS: 1 days | End: 2024-05-29
Payer: MEDICARE

## 2024-05-29 VITALS
TEMPERATURE: 98 F | HEART RATE: 100 BPM | SYSTOLIC BLOOD PRESSURE: 141 MMHG | RESPIRATION RATE: 18 BRPM | DIASTOLIC BLOOD PRESSURE: 79 MMHG | OXYGEN SATURATION: 95 %

## 2024-05-29 PROCEDURE — 11042 DBRDMT SUBQ TIS 1ST 20SQCM/<: CPT

## 2024-05-30 NOTE — PLAN
[FreeTextEntry1] : 9/18/23 Plan - shower wound, scrub misael wound skin well as this area positive on moleculight use pump daily, 45 minutes can c/w ioplex, will ask nurse to get acticoat, needs to wipe down leg and wound with vashe before applying products xtrasorb/dynaflex follow up 2-3 weeks  10/6/23 Plan: Moistened Ioplex with water to remove if it adheres to the wound bed. Plan - shower wound, scrub misael wound skin well  use pump daily, 45 minutes WAsh wound with Vashe, apply Zinc oxide to the misael wound Ioplex, xtrasorb/dynaflex follow up 2-3 weeks Nursing orders given. 11/22/23 Plan - orders for nurse c/w ioplex/super absorber/dynaflex follow up 2-3 weeks 12/13/23 Recommended  using compression garment on the RLE Continue compression garment on the LLE. Repeat vascular studies. Ioplex to LLE wounds. Compression wrap to LLE. The patient was positioned as follows .  The fluorescence imaging device was positioned 8-12 cm from the patient and the clinical darkness required for imaging was obtained.  The wound measured by [employer]  The [employer] i:X fluorescence imaging device was used to report presence and location of red or cyan fluorescence, indicative of bacteria at loads greater than 104 CFU/g in real-time.  Images reported to be positive mechanical washed.  Due to presence of infection causing bacteria the wound was cleansed.  Fluorescence images acquired after cleansing reported no reduction in bacteria..  I then turned my attention to debridement, resulting in decrease in the fluorescence image. measured for compression grment vascular test to be scheduled  1/10/24 Plan: Maintain wound veil in place. Do not remove wound veil Keep area dry Nursing orders given Patient measured for Circaids on the next visit. RTO in two weeks   1/14/24 Plan - veil to be left in place, change above follow up in 2 weeks nursing orders given  02/28/2024 isabel, xtrasorb, multilayer dressing follow up in 2-3 weeks nursing orders given Affinity will be ordered 3/20/24 Plan - skin sub. applied/veil over/dynaflex follow up 2 weeks orders for nurse keflex to pharmacy for right leg 4/10/24 Plan - order skin sub. orders for nurse  resume showering, isabel/aquacel/dynaflex follow up 3 weeks  5/1/24 S/P mechanical debridement. Moisturize daily. Isabel/ Aqua Cell/Dynaplex. Nurses' orders given. Follow-up 3-4 weeks.  5/29/24 Plan: Today applied Cavilon advanced to periwound skin on LLE. BLE: Cleanse with vashe, do not rinse. Moisturize intact skin LLE: Drawtex to wound bed. Multilayer compression RLE: Today multiplayer compression wrap - remove at next nursing visit. Then compression garment daily Nursing orders provided Recommend follow-up with Dr. Chavez for evaluation of RLE veins Follow up in 3-4w

## 2024-05-30 NOTE — ASSESSMENT
[FreeTextEntry1] : 75 yr old woman with left leg venous ulcer , afib, HTN lymphedema  follow up after ablation done 1/26/21. for lt leg  data complex - mod lab,xr reviewed risk- surgery mod on ac, tolerated debridment well, improved pain 9/22/21 right leg was edematous last week so unna was applied, no wounds needs new compression garment left leg has been using ioplex s/p excisional debridement re-measured for compression garment 10/20/21 Wound being treated with ioplex, s/p excisional debridement slight periwound maceration noted 11/10/21 Left leg venous ulcer, wound decrease in size Patient has been using ioplex s/p excisional debridement periwound maceration /The patient was positioned to allow for optimization of imaging. The fluorescence imaging device was placed 8-12 cm from the patient and the clinical darkness required for imaging was obtained by a dark drape. The wound measured () on the (). The [employer] i:X fluorescence imaging device was used to report presence and location of cyan fluorescence, indicative of bacteria at loads greater than 104 CFU/g in real-time. Images reported to have cyan fluorescence. The wound was mechanically cleansed with Dakins 0.125% and underwent excisional debridement. Fluorescence images acquired after cleansing demonstrated reduction in bacteria.  1/14/22 wound clean and pink, size has hit a plateau according to visiting nurse measurements has been using ioplex 4/6/22 Apligraf number 2 applied today to patient's left leg. Wound bed debrided of bioburden and prepped for product application. 1 of pieces and original size of product) obtained. Total product usage was 7.7 sq. cm), Total waste 36.3sq. cm) due to wound size. Product secured with (steri strips and bolster dressing). Patient to f/u in 1 week.  The patient was positioned to allow for optimization of imaging. The fluorescence imaging device was placed 8-12 cm from the patient and the clinical darkness required for imaging was obtained by a dark drape. The wound measured ----- cm2 on the /left ---leg. [employer] i:X fluorescence imaging device was used to report presence and location of red or cyan fluorescence, indicative of bacteria at loads greater than 104 CFU/g in real-time. s/p excisional debridement 5/16/22 wound clean, using isabel, distal 1/3 of wound is starting to pull into center of wound 5/23/22 apligraf number 3 applied today to patient's left leg. Wound bed debrided of bioburden and prepped for product application. 1 of pieces and original size of product) obtained. Total product usage was 12 sq. cm), Total waste 32 sq. cm) due to wound size. Product secured with (steri strips and bolster dressing). Patient to f/u in 1 week. The patient was positioned to allow for optimization of imaging. The fluorescence imaging device was placed 8-12 cm from the patient and the clinical darkness required for imaging was obtained by a dark drape. The wound measured TA on the /left ---leg). The [employer] i:X fluorescence imaging device was used to report presence and location of red fluorescence, indicative of bacteria at loads greater than 104 CFU/g in real-time. Images reported to have ---no/red/cyan----fluorescence. The wound was ----mechanically cleansed with vashe and/or underwent excisional debridement. Fluorescence images acquired after cleansing demonstrated reduction in bacteria. 8/1/22 USING PUMP WEARING COMPRESSION RIGHT LEG apligraf number () applied today to patient's (anatomical site). Wound bed debrided of bioburden and prepped for product application. (# of pieces and original size of product) obtained. Total product usage was (x sq. cm), Total waste (x sq. cm) due to wound size. Product secured with (steri strips and bolster dressing). Patient to f/u in 1 week.  11/16/22 puraply number 1 applied today to patient's left leg. Wound bed debrided of bioburden and prepped for product application. 1 of pieces and original size of product) obtained. Total product usage was 9 sq. cm), Total waste 6 due to wound size. Product secured with (steri strips and bolster dressing). Patient to f/u in 1 week. s/p excisional debridement  12/9/22 right leg, s/p evacuation hematoma and underlying venous insufficiency, has puraply application 2 weeks ago, at 6 0'clock island of skin and also in center of wound noted   12/28/2022 puraply number 2 applied today to patient's left leg. Wound bed debrided of bioburden and prepped for product application. 1 of pieces and original size of product) obtained. Total product usage was 9 sq. cm), Total waste 6 due to wound size. Product secured with (steri strips and bolster dressing). Patient to f/u in 1 week. s/p excisional debridement No clinical sign of infection The patient was positioned as follows. The fluorescence imaging device was positioned 8-12 cm from the patient and the clinical darkness required for imaging was obtained. The wound measured by [employer].. The [employer] i:X fluorescence imaging device was used to report presence and location of red or cyan fluorescence, indicative of bacteria at loads greater than 104 CFU/g in real-time. Images reported to be negative. Due to presence of infection causing bacteria the wound was cleansed.e. 2/15/23 puraply number 1 applied today to patient's left leg. Wound bed debrided of bioburden and prepped for product application. 1 of pieces and original size of product obtained. Total product usage was 7.7 sq. cm), Total waste 2.3 sq. cm) due to wound size. Product secured with (steri strips and bolster dressing). Patient to f/u in 1 week.  7/5/23 Wound Assessment and Plan: The patient presents with a wound to the LLE. Patient is s/p vascular procedures. Patient dressing wounds with Ioplex.Swelling noted to bilateral extremities. On exam: LLE Two wounds present Wound beds are pink Periwound maceration s/p mechanical debridement RLE Leg swelling no open wounds blanchable erythema  The patient was positioned to allow for optimization of imaging. The fluorescence imaging device was placed 8-12 cm from the patient and the clinical darkness required for imaging was obtained by a dark drape. The wound measured by TA. The [employer] i:X fluorescence imaging device was used to report presence and location of cyan/red fluorescence, indicative of bacteria at loads greater than 104 CFU/g in real-time. Images reported to have cyan/red fluorescence. The wound was mechanically cleansed with Vashe and underwent mechanical debridement. Fluorescence images acquired after cleansing demonstrated reduction in bacteria. No clinical sign of infection  Wash wound with Dove skin sensitive soap and clean water LLE Applied Ioplex/Xtrasorb/Multilayer compression dressings RLE multilayer compression dressing to reduce swelling Change dressing 3 times per week. Leg elevation as tolerated Encouraged ambulation or exercise. Optimization of nutrition. Offloading to the wound site. Home care orders in place Follow up appointment scheduled for 2-3 weeks 9/18/23 left calf 2 wounds, drainage less The patient was positioned to allow for optimization of imaging. The fluorescence imaging device was placed 8-12 cm from the patient and the clinical darkness required for imaging was obtained by a dark drape. The wound measured ----- cm2 on the /left ---leg/foot). [employer] i:X fluorescence imaging device was used to report presence and location of red and cyan fluorescence, indicative of bacteria at loads greater than 104 CFU/g in real-time. Images reported to have ---no/red & cyan----fluorescence. The wound was ----mechanically cleansed with vashe and/or underwent excisional debridement. Fluorescence images acquired after cleansing demonstrated reduction in bacteria. s/p excisional debridement has been using ioplex not using pump consistently s/p Left RFA  10/9/23 Patient presents for follow up of LLE wounds accompanied by her family memeber. On exam: Wound with red wound bed Periwound slightly macerated s/p mechanical debridement Today applied Zinc to the periwound Aquacel/Superabsorber to the wound bed K2 multilayer dressing HHA to moistened Ioplex before removing to avoid bleeding from the wound bed.  11/22/23 left leg wound superficial, edges with crusting, s/p excisional debridement has been using ioplex, vashe  right leg slightly warm, edematous - will treat, denies fever, pain The patient was positioned to allow for optimization of imaging. The fluorescence imaging device was placed 8-12 cm from the patient and the clinical darkness required for imaging was obtained by a dark drape. The wound measured   ----- cm2 on the /left ---leg. [employer] i:X fluorescence imaging device was used to report presence and location of cyan fluorescence, indicative of bacteria at loads greater than 104 CFU/g in real-time. Images reported to have ---no cyan----fluorescence. The wound was ----mechanically cleansed with Dakins 0.125%/Iodine/0.9% saline) and/or underwent excisional debridement.  Fluorescence images acquired after cleansing demonstrated reduction in bacteria.   12/13/23 The patient is seen for follow-up for  LLE  wounds. States she is wearing compression garment. The RLE is swollen- doesn't wear stocking on that side due to inability to put the garment on.  1/10/24    Patient presents with her son for follow up of LLE wound. Patient wearing compression on the RLE and wrapped on the LLE. Patient had US testing performed today on b/l lower extremities. Results of testing discussed with patient and son.  On exam: Wounds have red granulation tissue, layer of yellow slough, wound edges macerated, periwound skin intact. s/p excisional debridement The patient was positioned to allow for optimization of imaging. The fluorescence imaging device was placed 8-12 cm from the patient and the clinical darkness required for imaging was obtained by a dark drape. The wound measured TA. The [employer] i:X fluorescence imaging device was used to report presence and location of cyan fluorescence, indicative of bacteria at loads greater than 104 CFU/g in real-time. Images reported to have cyan fluorescence. The wound was mechanically cleansed with Vashe and underwent excisional debridement. Fluorescence images acquired after cleansing demonstrated reduction in bacteria.  Affinity #1 applied today to patients LLE Wounds bed debrided of bioburden and prepped for product application. (1 pieces cut in two and 7 square cm) obtained. Total product usage was (2.96 sq. cm), Total waste (4.04 sq cm) due to wound size. Product secured with (sterile strips and bolster dressing). Maintain wound veil in place until next visit. Keep area dry. Patient has homecare. Has been dressing wound with Ioplex/Aquacel/multilayer dressing. Has a Lymphedema pump , not using daily. Patient to f/u in 2 weeks. 1/24/24 left leg - skin subsitute applied last week, still present on wound bed 1/31/24 s/p excisional debridement of slough, necrotic tissue, periwound hygiene wounds improved with Affinity. Recommend continuation of compression stocking to the RLE Plan for re application of Affinity.  Wound has shown improvement through increased granulation and decreased size)  Wound is free from infection, drainage and necrotic tissue.  (Patient has adequate blood supply as demonstrated by palpable pulses and an GALA of 1  Wound has been treated with standards of care for over 4 weeks including (compression, offloading, debridements.   02/14/2 s/p excisional debridement of slough, necrotic tissue, periwound hygiene wounds improved with Affinity.  The patient was positioned to allow for optimization of imaging. The fluorescence imaging device was placed 8-12 cm from the patient and the clinical darkness required for imaging was obtained by a dark drape.  The [employer] i:X fluorescence imaging device was used to report presence and location of red or cyan fluorescence, indicative of bacteria at loads greater than 104 CFU/g in real-time. Images reported to have ---no/red/cyan----fluorescence. The wound was ----mechanically cleansed with Dakins 0.125%/Iodine/0.9% saline) and/or underwent excisional debridement.  Fluorescence images acquired after cleansing demonstrated reduction in bacteria. No sign of infection. Patient does not smoke. Affinity #2 applied today to patients LLE Wounds bed debrided of bioburden and prepped for product application. (1 pieces cut in two and 7 square cm) obtained. Total product usage was (7 sq. cm), Total waste (0 sq cm) due to wound size. Product secured with (sterile strips and bolster dressing). Maintain wound veil in place until next visit. Keep area dry. Recommend continuation of compression stocking to the RLE   02/28/2024 The patient is seen for follow-up for  LLE  wounds. States she is wearing compression garment. affinity was applied on the last visit On exam: Wounds have red granulation tissue, layer of yellow slough, wound edges macerated, periwound skin intact. s/p excisional debridement Plan for re application of (affinity). Wound has shown improvement through (increased granulation and/or decreased size). Wound is free from infection, drainage and necrotic tissue. (Patient has adequate blood supply as demonstrated by palpable pulses and an GALA of (1.1) from (date). Patient is on a comprehensive diabetic management program with a  Wound has been treated with standards of care for (4) weeks including (compression, offloading, debridements). (skin substitute) number () applied today to patients (anatomical site).   Wound bed debrided of bioburden and prepped for product application.  (# of pieces and original size of product) obtained.  Total product usage was (x sq. cm), Total waste (x sq. cm) due to wound size.  Product secured with (steri strips and bolster dressing). Patient to f/u in 1 week. 3/20/24 Affinity number 1 applied today to patients left lateral calf.   Wound bed debrided of bioburden and prepped for product application.  (# of pieces and original size of product) obtained.  Total product usage was 2 sq. cm), Total waste 5 sq. cm) due to wound size.  Product secured with (steri strips and bolster dressing). Patient to f/u in 1 week. s/p excisional debridement right leg mildly cellulitic - slight warm to touch, no pain 4/10/24 left lateral calf wound had affinity placed 3 weeks ago, wound smaller  mechanical debridement  5/1/24 Patient is here for follow-up for a LLE wound. Physical Exam: Wound is clean, smaller, no need for skin substitute placement. S/P mechanical debridement. Has nurse twice a week.  5/29/24 Pt here for f/u of LLE wound. Accompanied by son. Has home nursing 2x/wk.  No fevers, no new complaints. On exam: BLE edema. Left lateral lower leg wound with mild yellow slough, moderate drainage. Periwound skin is macerated. Excisional debridement performed No evidence of infection  The patient was positioned to allow the best view of the imaging device . The fluorescence imaging device was positioned 8-12 cm from the patient and the clinical darkness required for imaging was obtained. The wound measured by [employer].. The [employer] i:X fluorescence imaging device was used to report presence and location of red or cyan fluorescence, indicative of bacteria at loads greater than 104 CFU/g in real-time. Images reported to be positive. Mechanical and excisional debridement performed, resulting in decrease in the fluorescence image

## 2024-05-30 NOTE — PHYSICAL EXAM
[Normal Rate and Rhythm] : normal rate and rhythm [Please See PDF for [employer]] : Please See PDF for [employer]. [1+] : left 1+ [Ankle Swelling (On Exam)] : present [Ankle Swelling Bilaterally] : bilaterally  [Ankle Swelling On The Left] : moderate [Varicose Veins Of Lower Extremities] : present [] : present [Skin Ulcer] : ulcer [Alert] : alert [Oriented to Person] : oriented to person [Oriented to Place] : oriented to place [Oriented to Time] : oriented to time [Calm] : calm [de-identified] : seated at table at home, good hygiene [de-identified] : camila [de-identified] : non labored [de-identified] : no gross deformities [de-identified] : LLE wound

## 2024-06-02 ENCOUNTER — RX RENEWAL (OUTPATIENT)
Age: 79
End: 2024-06-02

## 2024-06-02 RX ORDER — POTASSIUM CHLORIDE 750 MG/1
10 CAPSULE, EXTENDED RELEASE ORAL DAILY
Qty: 180 | Refills: 3 | Status: ACTIVE | COMMUNITY
Start: 2019-08-09 | End: 1900-01-01

## 2024-06-12 ENCOUNTER — APPOINTMENT (OUTPATIENT)
Dept: VASCULAR SURGERY | Facility: CLINIC | Age: 79
End: 2024-06-12
Payer: MEDICARE

## 2024-06-12 ENCOUNTER — APPOINTMENT (OUTPATIENT)
Dept: WOUND CARE | Facility: HOSPITAL | Age: 79
End: 2024-06-12
Payer: MEDICARE

## 2024-06-12 ENCOUNTER — OUTPATIENT (OUTPATIENT)
Dept: OUTPATIENT SERVICES | Facility: HOSPITAL | Age: 79
LOS: 1 days | End: 2024-06-12
Payer: MEDICARE

## 2024-06-12 ENCOUNTER — APPOINTMENT (OUTPATIENT)
Dept: VASCULAR SURGERY | Facility: CLINIC | Age: 79
End: 2024-06-12

## 2024-06-12 VITALS
BODY MASS INDEX: 35.44 KG/M2 | SYSTOLIC BLOOD PRESSURE: 135 MMHG | HEIGHT: 63 IN | HEART RATE: 109 BPM | TEMPERATURE: 98 F | DIASTOLIC BLOOD PRESSURE: 87 MMHG | WEIGHT: 200 LBS

## 2024-06-12 VITALS
DIASTOLIC BLOOD PRESSURE: 79 MMHG | RESPIRATION RATE: 18 BRPM | SYSTOLIC BLOOD PRESSURE: 125 MMHG | OXYGEN SATURATION: 90 % | TEMPERATURE: 98.2 F | HEART RATE: 102 BPM

## 2024-06-12 DIAGNOSIS — S81.802A UNSPECIFIED OPEN WOUND, LEFT LOWER LEG, INITIAL ENCOUNTER: ICD-10-CM

## 2024-06-12 DIAGNOSIS — I87.2 VENOUS INSUFFICIENCY (CHRONIC) (PERIPHERAL): ICD-10-CM

## 2024-06-12 DIAGNOSIS — I89.0 LYMPHEDEMA, NOT ELSEWHERE CLASSIFIED: ICD-10-CM

## 2024-06-12 DIAGNOSIS — I83.893 VARICOSE VEINS OF BILATERAL LOWER EXTREMITIES WITH OTHER COMPLICATIONS: ICD-10-CM

## 2024-06-12 PROCEDURE — 93971 EXTREMITY STUDY: CPT | Mod: RT

## 2024-06-12 PROCEDURE — 99214 OFFICE O/P EST MOD 30 MIN: CPT

## 2024-06-12 PROCEDURE — G0463: CPT

## 2024-06-12 PROCEDURE — 99213 OFFICE O/P EST LOW 20 MIN: CPT

## 2024-06-12 NOTE — HISTORY OF PRESENT ILLNESS
[FreeTextEntry1] : Ms. FLORINDA VALVERDE   presents to the office with a recurrent wound for several months duration. Left leg ulcer since 10/9/2020.     Prior, The wound is located on  the Left lower extremity.  The patient has complaints of mild pain and mild swelling. The patient has been dressing the wound with Ioplex foam and unna boots. The patient denies fevers or chills.  The patient has localized pain to the wound upon dressing changes.  The patient has no other complaints or associated symptoms.  Patient has homecare nurse coming 3x's per week for wound care Accompanied by son.  passed away. Patient sleeps in recliner and does not get into bed at night. Legs are down most of the night.

## 2024-06-12 NOTE — ASSESSMENT
[FreeTextEntry1] : 75 yr old woman with left leg venous ulcer , afib, HTN lymphedema  follow up after ablation done 1/26/21. for lt leg  data complex - mod lab,xr reviewed risk- surgery mod on ac, tolerated debridment well, improved pain 9/22/21 right leg was edematous last week so unna was applied, no wounds needs new compression garment left leg has been using ioplex s/p excisional debridement re-measured for compression garment 10/20/21 Wound being treated with ioplex, s/p excisional debridement slight periwound maceration noted 11/10/21 Left leg venous ulcer, wound decrease in size Patient has been using ioplex s/p excisional debridement periwound maceration /The patient was positioned to allow for optimization of imaging. The fluorescence imaging device was placed 8-12 cm from the patient and the clinical darkness required for imaging was obtained by a dark drape. The wound measured () on the (). The Bluepay i:X fluorescence imaging device was used to report presence and location of cyan fluorescence, indicative of bacteria at loads greater than 104 CFU/g in real-time. Images reported to have cyan fluorescence. The wound was mechanically cleansed with Dakins 0.125% and underwent excisional debridement. Fluorescence images acquired after cleansing demonstrated reduction in bacteria.  1/14/22 wound clean and pink, size has hit a plateau according to visiting nurse measurements has been using ioplex 4/6/22 Apligraf number 2 applied today to patient's left leg. Wound bed debrided of bioburden and prepped for product application. 1 of pieces and original size of product) obtained. Total product usage was 7.7 sq. cm), Total waste 36.3sq. cm) due to wound size. Product secured with (steri strips and bolster dressing). Patient to f/u in 1 week.  The patient was positioned to allow for optimization of imaging. The fluorescence imaging device was placed 8-12 cm from the patient and the clinical darkness required for imaging was obtained by a dark drape. The wound measured ----- cm2 on the /left ---leg. The Storm[employer] i:X fluorescence imaging device was used to report presence and location of red or cyan fluorescence, indicative of bacteria at loads greater than 104 CFU/g in real-time. s/p excisional debridement 5/16/22 wound clean, using isabel, distal 1/3 of wound is starting to pull into center of wound 5/23/22 apligraf number 3 applied today to patient's left leg. Wound bed debrided of bioburden and prepped for product application. 1 of pieces and original size of product) obtained. Total product usage was 12 sq. cm), Total waste 32 sq. cm) due to wound size. Product secured with (steri strips and bolster dressing). Patient to f/u in 1 week. The patient was positioned to allow for optimization of imaging. The fluorescence imaging device was placed 8-12 cm from the patient and the clinical darkness required for imaging was obtained by a dark drape. The wound measured TA on the /left ---leg). The [employer] i:X fluorescence imaging device was used to report presence and location of red fluorescence, indicative of bacteria at loads greater than 104 CFU/g in real-time. Images reported to have ---no/red/cyan----fluorescence. The wound was ----mechanically cleansed with vashe and/or underwent excisional debridement. Fluorescence images acquired after cleansing demonstrated reduction in bacteria. 8/1/22 USING PUMP WEARING COMPRESSION RIGHT LEG apligraf number () applied today to patient's (anatomical site). Wound bed debrided of bioburden and prepped for product application. (# of pieces and original size of product) obtained. Total product usage was (x sq. cm), Total waste (x sq. cm) due to wound size. Product secured with (steri strips and bolster dressing). Patient to f/u in 1 week.  11/16/22 puraply number 1 applied today to patient's left leg. Wound bed debrided of bioburden and prepped for product application. 1 of pieces and original size of product) obtained. Total product usage was 9 sq. cm), Total waste 6 due to wound size. Product secured with (steri strips and bolster dressing). Patient to f/u in 1 week. s/p excisional debridement  12/9/22 right leg, s/p evacuation hematoma and underlying venous insufficiency, has puraply application 2 weeks ago, at 6 0'clock island of skin and also in center of wound noted   12/28/2022 puraply number 2 applied today to patient's left leg. Wound bed debrided of bioburden and prepped for product application. 1 of pieces and original size of product) obtained. Total product usage was 9 sq. cm), Total waste 6 due to wound size. Product secured with (steri strips and bolster dressing). Patient to f/u in 1 week. s/p excisional debridement No clinical sign of infection The patient was positioned as follows. The fluorescence imaging device was positioned 8-12 cm from the patient and the clinical darkness required for imaging was obtained. The wound measured by Tissue [employer].. The Bluepay i:X fluorescence imaging device was used to report presence and location of red or cyan fluorescence, indicative of bacteria at loads greater than 104 CFU/g in real-time. Images reported to be negative. Due to presence of infection causing bacteria the wound was cleansed.e. 2/15/23 puraply number 1 applied today to patient's left leg. Wound bed debrided of bioburden and prepped for product application. 1 of pieces and original size of product obtained. Total product usage was 7.7 sq. cm), Total waste 2.3 sq. cm) due to wound size. Product secured with (steri strips and bolster dressing). Patient to f/u in 1 week.  7/5/23 Wound Assessment and Plan: The patient presents with a wound to the LLE. Patient is s/p vascular procedures. Patient dressing wounds with Ioplex.Swelling noted to bilateral extremities. On exam: LLE Two wounds present Wound beds are pink Periwound maceration s/p mechanical debridement RLE Leg swelling no open wounds blanchable erythema  The patient was positioned to allow for optimization of imaging. The fluorescence imaging device was placed 8-12 cm from the patient and the clinical darkness required for imaging was obtained by a dark drape. The wound measured by TA. The Bluepay i:X fluorescence imaging device was used to report presence and location of cyan/red fluorescence, indicative of bacteria at loads greater than 104 CFU/g in real-time. Images reported to have cyan/red fluorescence. The wound was mechanically cleansed with Vashe and underwent mechanical debridement. Fluorescence images acquired after cleansing demonstrated reduction in bacteria. No clinical sign of infection  Wash wound with Dove skin sensitive soap and clean water LLE Applied Ioplex/Xtrasorb/Multilayer compression dressings RLE multilayer compression dressing to reduce swelling Change dressing 3 times per week. Leg elevation as tolerated Encouraged ambulation or exercise. Optimization of nutrition. Offloading to the wound site. Home care orders in place Follow up appointment scheduled for 2-3 weeks 9/18/23 left calf 2 wounds, drainage less The patient was positioned to allow for optimization of imaging. The fluorescence imaging device was placed 8-12 cm from the patient and the clinical darkness required for imaging was obtained by a dark drape. The wound measured ----- cm2 on the /left ---leg/foot). The Storm[employer] i:X fluorescence imaging device was used to report presence and location of red and cyan fluorescence, indicative of bacteria at loads greater than 104 CFU/g in real-time. Images reported to have ---no/red & cyan----fluorescence. The wound was ----mechanically cleansed with vashe and/or underwent excisional debridement. Fluorescence images acquired after cleansing demonstrated reduction in bacteria. s/p excisional debridement has been using ioplex not using pump consistently s/p Left RFA  10/9/23 Patient presents for follow up of LLE wounds accompanied by her family memeber. On exam: Wound with red wound bed Periwound slightly macerated s/p mechanical debridement Today applied Zinc to the periwound Aquacel/Superabsorber to the wound bed K2 multilayer dressing HHA to moistened Ioplex before removing to avoid bleeding from the wound bed.  11/22/23 left leg wound superficial, edges with crusting, s/p excisional debridement has been using ioplex, vashe  right leg slightly warm, edematous - will treat, denies fever, pain The patient was positioned to allow for optimization of imaging. The fluorescence imaging device was placed 8-12 cm from the patient and the clinical darkness required for imaging was obtained by a dark drape. The wound measured   ----- cm2 on the /left ---leg. The Storm[employer]ht i:X fluorescence imaging device was used to report presence and location of cyan fluorescence, indicative of bacteria at loads greater than 104 CFU/g in real-time. Images reported to have ---no cyan----fluorescence. The wound was ----mechanically cleansed with Dakins 0.125%/Iodine/0.9% saline) and/or underwent excisional debridement.  Fluorescence images acquired after cleansing demonstrated reduction in bacteria.   12/13/23 The patient is seen for follow-up for  LLE  wounds. States she is wearing compression garment. The RLE is swollen- doesn't wear stocking on that side due to inability to put the garment on.  1/10/24    Patient presents with her son for follow up of LLE wound. Patient wearing compression on the RLE and wrapped on the LLE. Patient had US testing performed today on b/l lower extremities. Results of testing discussed with patient and son.  On exam: Wounds have red granulation tissue, layer of yellow slough, wound edges macerated, periwound skin intact. s/p excisional debridement The patient was positioned to allow for optimization of imaging. The fluorescence imaging device was placed 8-12 cm from the patient and the clinical darkness required for imaging was obtained by a dark drape. The wound measured TA. The [employer]pay i:X fluorescence imaging device was used to report presence and location of cyan fluorescence, indicative of bacteria at loads greater than 104 CFU/g in real-time. Images reported to have cyan fluorescence. The wound was mechanically cleansed with Vashe and underwent excisional debridement. Fluorescence images acquired after cleansing demonstrated reduction in bacteria.  Affinity #1 applied today to patients LLE Wounds bed debrided of bioburden and prepped for product application. (1 pieces cut in two and 7 square cm) obtained. Total product usage was (2.96 sq. cm), Total waste (4.04 sq cm) due to wound size. Product secured with (sterile strips and bolster dressing). Maintain wound veil in place until next visit. Keep area dry. Patient has homecare. Has been dressing wound with Ioplex/Aquacel/multilayer dressing. Has a Lymphedema pump , not using daily. Patient to f/u in 2 weeks. 1/24/24 left leg - skin subsitute applied last week, still present on wound bed 1/31/24 s/p excisional debridement of slough, necrotic tissue, periwound hygiene wounds improved with Affinity. Recommend continuation of compression stocking to the RLE Plan for re application of Affinity.  Wound has shown improvement through increased granulation and decreased size)  Wound is free from infection, drainage and necrotic tissue.  (Patient has adequate blood supply as demonstrated by palpable pulses and an GALA of 1  Wound has been treated with standards of care for over 4 weeks including (compression, offloading, debridements.   02/14/2 s/p excisional debridement of slough, necrotic tissue, periwound hygiene wounds improved with Affinity.  The patient was positioned to allow for optimization of imaging. The fluorescence imaging device was placed 8-12 cm from the patient and the clinical darkness required for imaging was obtained by a dark drape.  The Bluepay i:X fluorescence imaging device was used to report presence and location of red or cyan fluorescence, indicative of bacteria at loads greater than 104 CFU/g in real-time. Images reported to have ---no/red/cyan----fluorescence. The wound was ----mechanically cleansed with Dakins 0.125%/Iodine/0.9% saline) and/or underwent excisional debridement.  Fluorescence images acquired after cleansing demonstrated reduction in bacteria. No sign of infection. Patient does not smoke. Affinity #2 applied today to patients LLE Wounds bed debrided of bioburden and prepped for product application. (1 pieces cut in two and 7 square cm) obtained. Total product usage was (7 sq. cm), Total waste (0 sq cm) due to wound size. Product secured with (sterile strips and bolster dressing). Maintain wound veil in place until next visit. Keep area dry. Recommend continuation of compression stocking to the RLE   02/28/2024 The patient is seen for follow-up for  LLE  wounds. States she is wearing compression garment. affinity was applied on the last visit On exam: Wounds have red granulation tissue, layer of yellow slough, wound edges macerated, periwound skin intact. s/p excisional debridement Plan for re application of (affinity). Wound has shown improvement through (increased granulation and/or decreased size). Wound is free from infection, drainage and necrotic tissue. (Patient has adequate blood supply as demonstrated by palpable pulses and an GALA of (1.1) from (date). Patient is on a comprehensive diabetic management program with a  Wound has been treated with standards of care for (4) weeks including (compression, offloading, debridements). (skin substitute) number () applied today to patients (anatomical site).   Wound bed debrided of bioburden and prepped for product application.  (# of pieces and original size of product) obtained.  Total product usage was (x sq. cm), Total waste (x sq. cm) due to wound size.  Product secured with (steri strips and bolster dressing). Patient to f/u in 1 week. 3/20/24 Affinity number 1 applied today to patients left lateral calf.   Wound bed debrided of bioburden and prepped for product application.  (# of pieces and original size of product) obtained.  Total product usage was 2 sq. cm), Total waste 5 sq. cm) due to wound size.  Product secured with (steri strips and bolster dressing). Patient to f/u in 1 week. s/p excisional debridement right leg mildly cellulitic - slight warm to touch, no pain 4/10/24 left lateral calf wound had affinity placed 3 weeks ago, wound smaller  mechanical debridement  5/1/24 Patient is here for follow-up for a LLE wound. Physical Exam: Wound is clean, smaller, no need for skin substitute placement. S/P mechanical debridement. Has nurse twice a week.  5/29/24 Pt here for f/u of LLE wound. Accompanied by son. Has home nursing 2x/wk.  No fevers, no new complaints. On exam: BLE edema. Left lateral lower leg wound with mild yellow slough, moderate drainage. Periwound skin is macerated. Excisional debridement performed No evidence of infection  The patient was positioned to allow the best view of the imaging device . The fluorescence imaging device was positioned 8-12 cm from the patient and the clinical darkness required for imaging was obtained. The wound measured by Tissue [employer].. The Bluepay i:X fluorescence imaging device was used to report presence and location of red or cyan fluorescence, indicative of bacteria at loads greater than 104 CFU/g in real-time. Images reported to be positive. Mechanical and excisional debridement performed, resulting in decrease in the fluorescence image.  6/12/24 Patient here for follow up of LLE wound accompanied by her son. On exam: BLE edema Left lateral wound: Wound bed is red  Moderate serous drainage Periwound dry and scaly  Blanchable erythema The patient was positioned to allow the best view of the imaging device . The fluorescence imaging device was positioned 8-12 cm from the patient and the clinical darkness required for imaging was obtained. The wound measured by [employer]. The [employer] i:X fluorescence imaging device was used to report presence and location of red or cyan fluorescence, indicative of bacteria at loads greater than 104 CFU/g in real-time. Images reported to be positive. Mechanical and excisional debridement performed, resulting in decrease in the fluorescence image. no s/s of infection s/p mechanical debridement Revyve gel/Drawtex/Fior Encouraged patient to get into bed at night Son will help arrange for patient to get into bed d/w patient regarding her poor sleep hygiene

## 2024-06-12 NOTE — PHYSICAL EXAM
[Normal Rate and Rhythm] : normal rate and rhythm [1+] : left 1+ [Ankle Swelling (On Exam)] : present [Ankle Swelling Bilaterally] : bilaterally  [Ankle Swelling On The Left] : moderate [Varicose Veins Of Lower Extremities] : present [] : present [Skin Ulcer] : ulcer [Alert] : alert [Oriented to Person] : oriented to person [Oriented to Place] : oriented to place [Oriented to Time] : oriented to time [Calm] : calm [Please See PDF for [employer]] : Please See PDF for [employer]. [de-identified] : seated at table at home, good hygiene [de-identified] : camila [de-identified] : non labored [de-identified] : no gross deformities [de-identified] : LLE wound

## 2024-06-12 NOTE — PLAN
[FreeTextEntry1] : 9/18/23 Plan - shower wound, scrub misael wound skin well as this area positive on moleculight use pump daily, 45 minutes can c/w ioplex, will ask nurse to get acticoat, needs to wipe down leg and wound with vashe before applying products xtrasorb/dynaflex follow up 2-3 weeks  10/6/23 Plan: Moistened Ioplex with water to remove if it adheres to the wound bed. Plan - shower wound, scrub misael wound skin well  use pump daily, 45 minutes WAsh wound with Vashe, apply Zinc oxide to the misael wound Ioplex, xtrasorb/dynaflex follow up 2-3 weeks Nursing orders given. 11/22/23 Plan - orders for nurse c/w ioplex/super absorber/dynaflex follow up 2-3 weeks 12/13/23 Recommended  using compression garment on the RLE Continue compression garment on the LLE. Repeat vascular studies. Ioplex to LLE wounds. Compression wrap to LLE. The patient was positioned as follows .  The fluorescence imaging device was positioned 8-12 cm from the patient and the clinical darkness required for imaging was obtained.  The wound measured by [employer]..  The [employer] i:X fluorescence imaging device was used to report presence and location of red or cyan fluorescence, indicative of bacteria at loads greater than 104 CFU/g in real-time.  Images reported to be positive mechanical washed.  Due to presence of infection causing bacteria the wound was cleansed.  Fluorescence images acquired after cleansing reported no reduction in bacteria..  I then turned my attention to debridement, resulting in decrease in the fluorescence image. measured for compression grment vascular test to be scheduled  1/10/24 Plan: Maintain wound veil in place. Do not remove wound veil Keep area dry Nursing orders given Patient measured for Circaids on the next visit. RTO in two weeks   1/14/24 Plan - veil to be left in place, change above follow up in 2 weeks nursing orders given  02/28/2024 isabel, xtrasorb, multilayer dressing follow up in 2-3 weeks nursing orders given Affinity will be ordered 3/20/24 Plan - skin sub. applied/veil over/dynaflex follow up 2 weeks orders for nurse keflex to pharmacy for right leg 4/10/24 Plan - order skin sub. orders for nurse  resume showering, isabel/aquacel/dynaflex follow up 3 weeks  5/1/24 S/P mechanical debridement. Moisturize daily. Isabel/ Aqua Cell/Dynaplex. Nurses' orders given. Follow-up 3-4 weeks.  5/29/24 Plan: Today applied Cavilon advanced to periwound skin on LLE. BLE: Cleanse with vashe, do not rinse. Moisturize intact skin LLE: Drawtex to wound bed. Multilayer compression RLE: Today multiplayer compression wrap - remove at next nursing visit. Then compression garment daily Nursing orders provided Recommend follow-up with Dr. Chavez for evaluation of RLE veins Follow up in 3-4weeks.  6/12/24 Plan: Cavilon to the periwound Revyve gel/Drawtex/Fior Patient has an appt today with Dr. Chavez. Will be wrapped with K2 after the visit today. Follow up in two weeks.

## 2024-06-14 ENCOUNTER — RX RENEWAL (OUTPATIENT)
Age: 79
End: 2024-06-14

## 2024-06-14 RX ORDER — PREDNISONE 2.5 MG/1
2.5 TABLET ORAL
Qty: 180 | Refills: 1 | Status: ACTIVE | COMMUNITY
Start: 2018-07-26 | End: 1900-01-01

## 2024-06-14 RX ORDER — HYDROCHLOROTHIAZIDE 12.5 MG/1
12.5 CAPSULE ORAL DAILY
Qty: 90 | Refills: 3 | Status: ACTIVE | COMMUNITY
Start: 2022-01-06 | End: 1900-01-01

## 2024-06-18 NOTE — ASSESSMENT
[FreeTextEntry1] : Ms. FLORINDA VALVERDE is a 77 year with persistent and worsening right lower extremity venous insufficiency, CEAP classification C6 which is unresponsive to at least 3 months of compression stockings 20-30 mmHg, leg elevation, exercise and over the counter pain medication_(Ibuprofen). Patient has complaints of worsening left leg discomfort with swelling, fatigue, heaviness, achiness, cramping, restlessness, and painful varicosities. The patient's symptoms interfere with their ADL's, such as walking, shopping and house work, and their ability to work and exercise. Patient has intact pulses in both legs without evidence of arterial insufficiency.  Treatment is indicated not for cosmetic reasons but for symptomatic venous reflux disease with symptoms which is refractory to conservative therapy. Venous duplex study demonstrates left lower extremity venous insufficiency. The need for definitive effective treatment is based on severe, interfering and worsening reflux symptoms with evidence of venous insufficiency on venous ultrasound.  Patient is a candidate for endovenous ablation with right GSV RFA and right GSV Varithena.  The risks and benefits of endovenous ablation treatment versus continued conservative management were discussed with the patient. Patient chooses endovenous ablation treatments. Treatment plan to be scheduled.

## 2024-06-18 NOTE — HISTORY OF PRESENT ILLNESS
[FreeTextEntry1] : Ms. FLORINDA VALVERDE is a 77 year F who presents for follow-up evaluation of left leg pain for the past several months.  Ms. VALVERDE leg discomfort is associated with leg swelling, fatigue, heaviness, achiness, restlessness, muscle cramping, itchiness, dry, flaky skin, and skin darkening at the ankles.  She complains of painful varicose veins. Her symptoms have persisted despite conservative management with leg elevation, exercise, attempted weight loss, over-the-counter medications (ibuprofen), and compression stocking use for more than 3 months.  Her symptoms are aggravated by weight bearing, prolonged standing, prolonged sitting, extended travel, exercise Nothing helps to alleviate patient's symptoms.  Her symptoms interfere with the her ADLs such as shopping and housekeeping.  Ms. VALVERDE risks factor for venous disease are obesity, history of varicose veins, spider veins  Previous treatment, vein procedures and vein surgeries include: wearing compression stockings for more than 3 months with little or no relief and vein ablations  06/06/23 - s/p left SSV RFA. doing well. denies pain other than related to her pre-existing knee arthritis. admits swelling has dramatically improved in her left leg. denies any other issues [de-identified] : 06/12/24 - 77-year F who presents for follow-up evaluation of right leg pain for the past several months.  Ms. VALVERDE leg discomfort is associated with leg swelling, fatigue, heaviness, achiness, restlessness, muscle cramping, itchiness, dry, flaky skin, and skin darkening at the ankles.  She complains of painful varicose veins. Her symptoms have persisted despite conservative management with leg elevation, exercise, attempted weight loss, over-the-counter medications (ibuprofen), and compression stocking use for more than 3 months.  Her symptoms are aggravated by weight bearing, prolonged standing, prolonged sitting, extended travel, exercise Nothing helps to alleviate patient's symptoms.  Her symptoms interfere with the her ADLs such as shopping and housekeeping.  Ms. VALVERDE risks factor for venous disease are obesity, history of varicose veins, spider veins  Previous treatment, vein procedures and vein surgeries include: wearing compression stockings for more than 3 months with little or no relief and vein ablations

## 2024-06-18 NOTE — PHYSICAL EXAM
[Respiratory Effort] : normal respiratory effort [Normal Rate and Rhythm] : normal rate and rhythm [2+] : left 2+ [Ankle Swelling (On Exam)] : present [Ankle Swelling On The Left] : moderate [Varicose Veins Of Lower Extremities] : bilaterally [Ankle Swelling On The Right] : mild [] : of the left leg [Ankle Swelling Bilaterally] : severe [Skin Ulcer] : ulcer [Alert] : alert [Oriented to Person] : oriented to person [Oriented to Place] : oriented to place [Oriented to Time] : oriented to time [Calm] : calm [Abdomen Tenderness] : ~T ~M No abdominal tenderness [de-identified] : appears stated age [de-identified] : normocephalic, atraumatic [de-identified] : supple

## 2024-06-26 DIAGNOSIS — S81.802A UNSPECIFIED OPEN WOUND, LEFT LOWER LEG, INITIAL ENCOUNTER: ICD-10-CM

## 2024-06-26 DIAGNOSIS — I89.0 LYMPHEDEMA, NOT ELSEWHERE CLASSIFIED: ICD-10-CM
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
## 2024-06-27 DIAGNOSIS — L97.929 NON-PRESSURE CHRONIC ULCER OF UNSPECIFIED PART OF LEFT LOWER LEG WITH UNSPECIFIED SEVERITY: ICD-10-CM

## 2024-06-27 DIAGNOSIS — I87.312 CHRONIC VENOUS HYPERTENSION (IDIOPATHIC) WITH ULCER OF LEFT LOWER EXTREMITY: ICD-10-CM
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
## 2024-06-27 DIAGNOSIS — S81.802A UNSPECIFIED OPEN WOUND, LEFT LOWER LEG, INITIAL ENCOUNTER: ICD-10-CM

## 2024-07-01 ENCOUNTER — APPOINTMENT (OUTPATIENT)
Dept: WOUND CARE | Facility: HOSPITAL | Age: 79
End: 2024-07-01
Payer: MEDICARE

## 2024-07-01 ENCOUNTER — OUTPATIENT (OUTPATIENT)
Dept: OUTPATIENT SERVICES | Facility: HOSPITAL | Age: 79
LOS: 1 days | End: 2024-07-01
Payer: MEDICARE

## 2024-07-01 VITALS
SYSTOLIC BLOOD PRESSURE: 145 MMHG | HEART RATE: 11 BPM | OXYGEN SATURATION: 90 % | RESPIRATION RATE: 20 BRPM | DIASTOLIC BLOOD PRESSURE: 84 MMHG

## 2024-07-01 DIAGNOSIS — I87.312 CHRONIC VENOUS HYPERTENSION (IDIOPATHIC) WITH ULCER OF LEFT LOWER EXTREMITY: ICD-10-CM

## 2024-07-01 DIAGNOSIS — R60.0 LOCALIZED EDEMA: ICD-10-CM

## 2024-07-01 DIAGNOSIS — L97.929 CHRONIC VENOUS HYPERTENSION (IDIOPATHIC) WITH ULCER OF LEFT LOWER EXTREMITY: ICD-10-CM

## 2024-07-01 PROCEDURE — 11042 DBRDMT SUBQ TIS 1ST 20SQCM/<: CPT

## 2024-07-18 DIAGNOSIS — L97.929 NON-PRESSURE CHRONIC ULCER OF UNSPECIFIED PART OF LEFT LOWER LEG WITH UNSPECIFIED SEVERITY: ICD-10-CM

## 2024-07-18 DIAGNOSIS — R60.0 LOCALIZED EDEMA: ICD-10-CM

## 2024-07-18 DIAGNOSIS — I87.312 CHRONIC VENOUS HYPERTENSION (IDIOPATHIC) WITH ULCER OF LEFT LOWER EXTREMITY: ICD-10-CM

## 2024-07-22 ENCOUNTER — APPOINTMENT (OUTPATIENT)
Dept: WOUND CARE | Facility: HOSPITAL | Age: 79
End: 2024-07-22
Payer: MEDICARE

## 2024-07-22 VITALS
DIASTOLIC BLOOD PRESSURE: 78 MMHG | SYSTOLIC BLOOD PRESSURE: 136 MMHG | HEART RATE: 112 BPM | OXYGEN SATURATION: 92 % | RESPIRATION RATE: 20 BRPM | TEMPERATURE: 98.3 F

## 2024-07-22 DIAGNOSIS — I87.2 VENOUS INSUFFICIENCY (CHRONIC) (PERIPHERAL): ICD-10-CM

## 2024-07-22 DIAGNOSIS — S81.802A UNSPECIFIED OPEN WOUND, LEFT LOWER LEG, INITIAL ENCOUNTER: ICD-10-CM

## 2024-07-22 DIAGNOSIS — I89.0 LYMPHEDEMA, NOT ELSEWHERE CLASSIFIED: ICD-10-CM

## 2024-07-22 PROCEDURE — 29581 APPL MULTLAYER CMPRN SYS LEG: CPT | Mod: LT,59

## 2024-07-22 PROCEDURE — 11042 DBRDMT SUBQ TIS 1ST 20SQCM/<: CPT

## 2024-07-25 NOTE — PHYSICAL EXAM
[Normal Rate and Rhythm] : normal rate and rhythm [1+] : left 1+ [Ankle Swelling (On Exam)] : present [Ankle Swelling Bilaterally] : bilaterally  [Ankle Swelling On The Left] : moderate [Varicose Veins Of Lower Extremities] : present [Skin Ulcer] : ulcer [] : present [Alert] : alert [Oriented to Person] : oriented to person [Oriented to Place] : oriented to place [Oriented to Time] : oriented to time [Calm] : calm [Please See PDF for [employer]] : Please See PDF for [employer]. [de-identified] : seated at table at home, good hygiene [de-identified] : camila [de-identified] : non labored [de-identified] : no gross deformities [de-identified] : LLE wound

## 2024-07-25 NOTE — ASSESSMENT
[FreeTextEntry1] : 75 yr old woman with left leg venous ulcer , afib, HTN lymphedema  follow up after ablation done 1/26/21. for lt leg  data complex - mod lab,xr reviewed risk- surgery mod on ac, tolerated debridment well, improved pain 9/22/21 right leg was edematous last week so unna was applied, no wounds needs new compression garment left leg has been using ioplex s/p excisional debridement re-measured for compression garment 10/20/21 Wound being treated with ioplex, s/p excisional debridement slight periwound maceration noted 11/10/21 Left leg venous ulcer, wound decrease in size Patient has been using ioplex s/p excisional debridement periwound maceration /The patient was positioned to allow for optimization of imaging. The fluorescence imaging device was placed 8-12 cm from the patient and the clinical darkness required for imaging was obtained by a dark drape. The wound measured () on the (). The [employer] i:X fluorescence imaging device was used to report presence and location of cyan fluorescence, indicative of bacteria at loads greater than 104 CFU/g in real-time. Images reported to have cyan fluorescence. The wound was mechanically cleansed with Dakins 0.125% and underwent excisional debridement. Fluorescence images acquired after cleansing demonstrated reduction in bacteria.  1/14/22 wound clean and pink, size has hit a plateau according to visiting nurse measurements has been using ioplex 4/6/22 Apligraf number 2 applied today to patient's left leg. Wound bed debrided of bioburden and prepped for product application. 1 of pieces and original size of product) obtained. Total product usage was 7.7 sq. cm), Total waste 36.3sq. cm) due to wound size. Product secured with (steri strips and bolster dressing). Patient to f/u in 1 week.  The patient was positioned to allow for optimization of imaging. The fluorescence imaging device was placed 8-12 cm from the patient and the clinical darkness required for imaging was obtained by a dark drape. The wound measured ----- cm2 on the /left ---leg. The [employer] i:X fluorescence imaging device was used to report presence and location of red or cyan fluorescence, indicative of bacteria at loads greater than 104 CFU/g in real-time. s/p excisional debridement 5/16/22 wound clean, using isabel, distal 1/3 of wound is starting to pull into center of wound 5/23/22 apligraf number 3 applied today to patient's left leg. Wound bed debrided of bioburden and prepped for product application. 1 of pieces and original size of product) obtained. Total product usage was 12 sq. cm), Total waste 32 sq. cm) due to wound size. Product secured with (steri strips and bolster dressing). Patient to f/u in 1 week. The patient was positioned to allow for optimization of imaging. The fluorescence imaging device was placed 8-12 cm from the patient and the clinical darkness required for imaging was obtained by a dark drape. The wound measured TA on the /left ---leg). The [employer] i:X fluorescence imaging device was used to report presence and location of red fluorescence, indicative of bacteria at loads greater than 104 CFU/g in real-time. Images reported to have ---no/red/cyan----fluorescence. The wound was ----mechanically cleansed with vashe and/or underwent excisional debridement. Fluorescence images acquired after cleansing demonstrated reduction in bacteria. 8/1/22 USING PUMP WEARING COMPRESSION RIGHT LEG apligraf number () applied today to patient's (anatomical site). Wound bed debrided of bioburden and prepped for product application. (# of pieces and original size of product) obtained. Total product usage was (x sq. cm), Total waste (x sq. cm) due to wound size. Product secured with (steri strips and bolster dressing). Patient to f/u in 1 week.  11/16/22 puraply number 1 applied today to patient's left leg. Wound bed debrided of bioburden and prepped for product application. 1 of pieces and original size of product) obtained. Total product usage was 9 sq. cm), Total waste 6 due to wound size. Product secured with (steri strips and bolster dressing). Patient to f/u in 1 week. s/p excisional debridement  12/9/22 right leg, s/p evacuation hematoma and underlying venous insufficiency, has puraply application 2 weeks ago, at 6 0'clock island of skin and also in center of wound noted   12/28/2022 puraply number 2 applied today to patient's left leg. Wound bed debrided of bioburden and prepped for product application. 1 of pieces and original size of product) obtained. Total product usage was 9 sq. cm), Total waste 6 due to wound size. Product secured with (steri strips and bolster dressing). Patient to f/u in 1 week. s/p excisional debridement No clinical sign of infection The patient was positioned as follows. The fluorescence imaging device was positioned 8-12 cm from the patient and the clinical darkness required for imaging was obtained. The wound measured by [employer] The [employer] i:X fluorescence imaging device was used to report presence and location of red or cyan fluorescence, indicative of bacteria at loads greater than 104 CFU/g in real-time. Images reported to be negative. Due to presence of infection causing bacteria the wound was cleansed.e. 2/15/23 puraply number 1 applied today to patient's left leg. Wound bed debrided of bioburden and prepped for product application. 1 of pieces and original size of product obtained. Total product usage was 7.7 sq. cm), Total waste 2.3 sq. cm) due to wound size. Product secured with (steri strips and bolster dressing). Patient to f/u in 1 week.  7/5/23 Wound Assessment and Plan: The patient presents with a wound to the LLE. Patient is s/p vascular procedures. Patient dressing wounds with Ioplex.Swelling noted to bilateral extremities. On exam: LLE Two wounds present Wound beds are pink Periwound maceration s/p mechanical debridement RLE Leg swelling no open wounds blanchable erythema  The patient was positioned to allow for optimization of imaging. The fluorescence imaging device was placed 8-12 cm from the patient and the clinical darkness required for imaging was obtained by a dark drape. The wound measured by TA. The L[employer]aki i:X fluorescence imaging device was used to report presence and location of cyan/red fluorescence, indicative of bacteria at loads greater than 104 CFU/g in real-time. Images reported to have cyan/red fluorescence. The wound was mechanically cleansed with Vashe and underwent mechanical debridement. Fluorescence images acquired after cleansing demonstrated reduction in bacteria. No clinical sign of infection  Wash wound with Dove skin sensitive soap and clean water LLE Applied Ioplex/Xtrasorb/Multilayer compression dressings RLE multilayer compression dressing to reduce swelling Change dressing 3 times per week. Leg elevation as tolerated Encouraged ambulation or exercise. Optimization of nutrition. Offloading to the wound site. Home care orders in place Follow up appointment scheduled for 2-3 weeks 9/18/23 left calf 2 wounds, drainage less The patient was positioned to allow for optimization of imaging. The fluorescence imaging device was placed 8-12 cm from the patient and the clinical darkness required for imaging was obtained by a dark drape. The wound measured ----- cm2 on the /left ---leg/foot). The [employer] i:X fluorescence imaging device was used to report presence and location of red and cyan fluorescence, indicative of bacteria at loads greater than 104 CFU/g in real-time. Images reported to have ---no/red & cyan----fluorescence. The wound was ----mechanically cleansed with vashe and/or underwent excisional debridement. Fluorescence images acquired after cleansing demonstrated reduction in bacteria. s/p excisional debridement has been using ioplex not using pump consistently s/p Left RFA  10/9/23 Patient presents for follow up of LLE wounds accompanied by her family memeber. On exam: Wound with red wound bed Periwound slightly macerated s/p mechanical debridement Today applied Zinc to the periwound Aquacel/Superabsorber to the wound bed K2 multilayer dressing HHA to moistened Ioplex before removing to avoid bleeding from the wound bed.  11/22/23 left leg wound superficial, edges with crusting, s/p excisional debridement has been using ioplex, vashe  right leg slightly warm, edematous - will treat, denies fever, pain The patient was positioned to allow for optimization of imaging. The fluorescence imaging device was placed 8-12 cm from the patient and the clinical darkness required for imaging was obtained by a dark drape. The wound measured   ----- cm2 on the /left ---leg. The [employer] i:X fluorescence imaging device was used to report presence and location of cyan fluorescence, indicative of bacteria at loads greater than 104 CFU/g in real-time. Images reported to have ---no cyan----fluorescence. The wound was ----mechanically cleansed with Dakins 0.125%/Iodine/0.9% saline) and/or underwent excisional debridement.  Fluorescence images acquired after cleansing demonstrated reduction in bacteria.   12/13/23 The patient is seen for follow-up for  LLE  wounds. States she is wearing compression garment. The RLE is swollen- doesn't wear stocking on that side due to inability to put the garment on.  1/10/24    Patient presents with her son for follow up of LLE wound. Patient wearing compression on the RLE and wrapped on the LLE. Patient had US testing performed today on b/l lower extremities. Results of testing discussed with patient and son.  On exam: Wounds have red granulation tissue, layer of yellow slough, wound edges macerated, periwound skin intact. s/p excisional debridement The patient was positioned to allow for optimization of imaging. The fluorescence imaging device was placed 8-12 cm from the patient and the clinical darkness required for imaging was obtained by a dark drape. The wound measured TA. The Lomaki i:X fluorescence imaging device was used to report presence and location of cyan fluorescence, indicative of bacteria at loads greater than 104 CFU/g in real-time. Images reported to have cyan fluorescence. The wound was mechanically cleansed with Vashe and underwent excisional debridement. Fluorescence images acquired after cleansing demonstrated reduction in bacteria.  Affinity #1 applied today to patients LLE Wounds bed debrided of bioburden and prepped for product application. (1 pieces cut in two and 7 square cm) obtained. Total product usage was (2.96 sq. cm), Total waste (4.04 sq cm) due to wound size. Product secured with (sterile strips and bolster dressing). Maintain wound veil in place until next visit. Keep area dry. Patient has homecare. Has been dressing wound with Ioplex/Aquacel/multilayer dressing. Has a Lymphedema pump , not using daily. Patient to f/u in 2 weeks. 1/24/24 left leg - skin subsitute applied last week, still present on wound bed 1/31/24 s/p excisional debridement of slough, necrotic tissue, periwound hygiene wounds improved with Affinity. Recommend continuation of compression stocking to the RLE Plan for re application of Affinity.  Wound has shown improvement through increased granulation and decreased size)  Wound is free from infection, drainage and necrotic tissue.  (Patient has adequate blood supply as demonstrated by palpable pulses and an GALA of 1  Wound has been treated with standards of care for over 4 weeks including (compression, offloading, debridements.   02/14/2 s/p excisional debridement of slough, necrotic tissue, periwound hygiene wounds improved with Affinity.  The patient was positioned to allow for optimization of imaging. The fluorescence imaging device was placed 8-12 cm from the patient and the clinical darkness required for imaging was obtained by a dark drape.  The Lomaki i:X fluorescence imaging device was used to report presence and location of red or cyan fluorescence, indicative of bacteria at loads greater than 104 CFU/g in real-time. Images reported to have ---no/red/cyan----fluorescence. The wound was ----mechanically cleansed with Dakins 0.125%/Iodine/0.9% saline) and/or underwent excisional debridement.  Fluorescence images acquired after cleansing demonstrated reduction in bacteria. No sign of infection. Patient does not smoke. Affinity #2 applied today to patients LLE Wounds bed debrided of bioburden and prepped for product application. (1 pieces cut in two and 7 square cm) obtained. Total product usage was (7 sq. cm), Total waste (0 sq cm) due to wound size. Product secured with (sterile strips and bolster dressing). Maintain wound veil in place until next visit. Keep area dry. Recommend continuation of compression stocking to the RLE   02/28/2024 The patient is seen for follow-up for  LLE  wounds. States she is wearing compression garment. affinity was applied on the last visit On exam: Wounds have red granulation tissue, layer of yellow slough, wound edges macerated, periwound skin intact. s/p excisional debridement Plan for re application of (affinity). Wound has shown improvement through (increased granulation and/or decreased size). Wound is free from infection, drainage and necrotic tissue. (Patient has adequate blood supply as demonstrated by palpable pulses and an GALA of (1.1) from (date). Patient is on a comprehensive diabetic management program with a  Wound has been treated with standards of care for (4) weeks including (compression, offloading, debridements). (skin substitute) number () applied today to patients (anatomical site).   Wound bed debrided of bioburden and prepped for product application.  (# of pieces and original size of product) obtained.  Total product usage was (x sq. cm), Total waste (x sq. cm) due to wound size.  Product secured with (steri strips and bolster dressing). Patient to f/u in 1 week. 3/20/24 Affinity number 1 applied today to patients left lateral calf.   Wound bed debrided of bioburden and prepped for product application.  (# of pieces and original size of product) obtained.  Total product usage was 2 sq. cm), Total waste 5 sq. cm) due to wound size.  Product secured with (steri strips and bolster dressing). Patient to f/u in 1 week. s/p excisional debridement right leg mildly cellulitic - slight warm to touch, no pain 4/10/24 left lateral calf wound had affinity placed 3 weeks ago, wound smaller  mechanical debridement  5/1/24 Patient is here for follow-up for a LLE wound. Physical Exam: Wound is clean, smaller, no need for skin substitute placement. S/P mechanical debridement. Has nurse twice a week.  5/29/24 Pt here for f/u of LLE wound. Accompanied by son. Has home nursing 2x/wk.  No fevers, no new complaints. On exam: BLE edema. Left lateral lower leg wound with mild yellow slough, moderate drainage. Periwound skin is macerated. Excisional debridement performed No evidence of infection  The patient was positioned to allow the best view of the imaging device . The fluorescence imaging device was positioned 8-12 cm from the patient and the clinical darkness required for imaging was obtained. The wound measured by [employer] The Lomaki i:X fluorescence imaging device was used to report presence and location of red or cyan fluorescence, indicative of bacteria at loads greater than 104 CFU/g in real-time. Images reported to be positive. Mechanical and excisional debridement performed, resulting in decrease in the fluorescence image.  6/12/24 Patient here for follow up of LLE wound accompanied by her son. On exam: BLE edema Left lateral wound: Wound bed is red  Moderate serous drainage Periwound dry and scaly  Blanchable erythema The patient was positioned to allow the best view of the imaging device . The fluorescence imaging device was positioned 8-12 cm from the patient and the clinical darkness required for imaging was obtained. The wound measured by [employer]. The L[employer]aki i:X fluorescence imaging device was used to report presence and location of red or cyan fluorescence, indicative of bacteria at loads greater than 104 CFU/g in real-time. Images reported to be positive. Mechanical and excisional debridement performed, resulting in decrease in the fluorescence image. no s/s of infection s/p mechanical debridement Revyve gel/Drawtex/Fior Encouraged patient to get into bed at night Son will help arrange for patient to get into bed d/w patient regarding her poor sleep hygiene  7/1/2024 Patient present with a new blister to the dorsal aspect of the Left foot. Left lateral wound: Wound bed is red with scant yellow slough Moderate serous drainage Periwound macerated.  Blanchable erythema There is a large blister over the dorsum of the left foot with significant swelling. [facility] imaging revealed no evidence of bacteria. S/P excisional debridement of the LLE lateral  wound and unroofing of the blister. Has nurse only twice weekly. Will increase to 3x/week. Patient continues to sleep in recliner.with legs down. D/w son present and patient to sleep in bed 7/22/24 left calf ulcer larger, started using sorbact 1 week ago drainage is copious, s/p excisional debridement long discussion today about sleeping in chair, diet, medication adherence and timing of diuretic encouraged to keep appt. with dr adan for right leg RFA and edwardo

## 2024-07-25 NOTE — PLAN
[FreeTextEntry1] : 9/18/23 Plan - shower wound, scrub misael wound skin well as this area positive on moleculight use pump daily, 45 minutes can c/w ioplex, will ask nurse to get acticoat, needs to wipe down leg and wound with vashe before applying products xtrasorb/dynaflex follow up 2-3 weeks  10/6/23 Plan: Moistened Ioplex with water to remove if it adheres to the wound bed. Plan - shower wound, scrub misael wound skin well  use pump daily, 45 minutes WAsh wound with Vashe, apply Zinc oxide to the misael wound Ioplex, xtrasorb/dynaflex follow up 2-3 weeks Nursing orders given. 11/22/23 Plan - orders for nurse c/w ioplex/super absorber/dynaflex follow up 2-3 weeks 12/13/23 Recommended  using compression garment on the RLE Continue compression garment on the LLE. Repeat vascular studies. Ioplex to LLE wounds. Compression wrap to LLE. The patient was positioned as follows .  The fluorescence imaging device was positioned 8-12 cm from the patient and the clinical darkness required for imaging was obtained.  The wound measured by Tissue [employer]  The [employer] i:X fluorescence imaging device was used to report presence and location of red or cyan fluorescence, indicative of bacteria at loads greater than 104 CFU/g in real-time.  Images reported to be positive mechanical washed.  Due to presence of infection causing bacteria the wound was cleansed.  Fluorescence images acquired after cleansing reported no reduction in bacteria..  I then turned my attention to debridement, resulting in decrease in the fluorescence image. measured for compression grment vascular test to be scheduled  1/10/24 Plan: Maintain wound veil in place. Do not remove wound veil Keep area dry Nursing orders given Patient measured for Circaids on the next visit. RTO in two weeks   1/14/24 Plan - veil to be left in place, change above follow up in 2 weeks nursing orders given  02/28/2024 isabel, xtrasorb, multilayer dressing follow up in 2-3 weeks nursing orders given Affinity will be ordered 3/20/24 Plan - skin sub. applied/veil over/dynaflex follow up 2 weeks orders for nurse keflex to pharmacy for right leg 4/10/24 Plan - order skin sub. orders for nurse  resume showering, isabel/aquacel/dynaflex follow up 3 weeks  5/1/24 S/P mechanical debridement. Moisturize daily. Isabel/ Aqua Cell/Dynaplex. Nurses' orders given. Follow-up 3-4 weeks.  5/29/24 Plan: Today applied Cavilon advanced to periwound skin on LLE. BLE: Cleanse with vashe, do not rinse. Moisturize intact skin LLE: Drawtex to wound bed. Multilayer compression RLE: Today multiplayer compression wrap - remove at next nursing visit. Then compression garment daily Nursing orders provided Recommend follow-up with Dr. Chavez for evaluation of RLE veins Follow up in 3-4weeks.  6/12/24 Plan: Cavilon to the periwound Revyve gel/Drawtex/Fior Patient has an appt today with Dr. Chavez. Will be wrapped with K2 after the visit today. Follow up in two weeks.  7/1/24 Plan: Increase nurse's visit to 3x/week.  Wash wound with soap and water. S/P excisional debridement of the LLE lateral wound. Blister over the dorsum of the left foot was unroofed.  Cavilon to the misael-wound. K2 wrap. Continue with compression stockings. Follow-up visit in 3-4 weeks. 7/22/24 Plan - orders for nurse augie supplements given c/w wash/vashe clean, sorbact/super absorber/dynaflex lymphedema pump daily  needs weekend help diet, med. adherence, sleeping in bed follow up 3 weeks

## 2024-07-25 NOTE — PLAN
[FreeTextEntry1] : 9/18/23 Plan - shower wound, scrub misael wound skin well as this area positive on moleculight use pump daily, 45 minutes can c/w ioplex, will ask nurse to get acticoat, needs to wipe down leg and wound with vashe before applying products xtrasorb/dynaflex follow up 2-3 weeks  10/6/23 Plan: Moistened Ioplex with water to remove if it adheres to the wound bed. Plan - shower wound, scrub misael wound skin well  use pump daily, 45 minutes WAsh wound with Vashe, apply Zinc oxide to the misael wound Ioplex, xtrasorb/dynaflex follow up 2-3 weeks Nursing orders given. 11/22/23 Plan - orders for nurse c/w ioplex/super absorber/dynaflex follow up 2-3 weeks 12/13/23 Recommended  using compression garment on the RLE Continue compression garment on the LLE. Repeat vascular studies. Ioplex to LLE wounds. Compression wrap to LLE. The patient was positioned as follows .  The fluorescence imaging device was positioned 8-12 cm from the patient and the clinical darkness required for imaging was obtained.  The wound measured by Tissue [employer]..  The [employer] i:X fluorescence imaging device was used to report presence and location of red or cyan fluorescence, indicative of bacteria at loads greater than 104 CFU/g in real-time.  Images reported to be positive mechanical washed.  Due to presence of infection causing bacteria the wound was cleansed.  Fluorescence images acquired after cleansing reported no reduction in bacteria..  I then turned my attention to debridement, resulting in decrease in the fluorescence image. measured for compression grment vascular test to be scheduled  1/10/24 Plan: Maintain wound veil in place. Do not remove wound veil Keep area dry Nursing orders given Patient measured for Circaids on the next visit. RTO in two weeks   1/14/24 Plan - veil to be left in place, change above follow up in 2 weeks nursing orders given  02/28/2024 isabel, xtrasorb, multilayer dressing follow up in 2-3 weeks nursing orders given Affinity will be ordered 3/20/24 Plan - skin sub. applied/veil over/dynaflex follow up 2 weeks orders for nurse keflex to pharmacy for right leg 4/10/24 Plan - order skin sub. orders for nurse  resume showering, isabel/aquacel/dynaflex follow up 3 weeks  5/1/24 S/P mechanical debridement. Moisturize daily. Isabel/ Aqua Cell/Dynaplex. Nurses' orders given. Follow-up 3-4 weeks.  5/29/24 Plan: Today applied Cavilon advanced to periwound skin on LLE. BLE: Cleanse with vashe, do not rinse. Moisturize intact skin LLE: Drawtex to wound bed. Multilayer compression RLE: Today multiplayer compression wrap - remove at next nursing visit. Then compression garment daily Nursing orders provided Recommend follow-up with Dr. Chavez for evaluation of RLE veins Follow up in 3-4weeks.  6/12/24 Plan: Cavilon to the periwound Revyve gel/Drawtex/Fior Patient has an appt today with Dr. Chavez. Will be wrapped with K2 after the visit today. Follow up in two weeks.  7/1/24 Plan: Increase nurse's visit to 3x/week.  Wash wound with soap and water. S/P excisional debridement of the LLE lateral wound. Blister over the dorsum of the left foot was unroofed.  Cavilon to the misael-wound. K2 wrap. Continue with compression stockings. Follow-up visit in 3-4 weeks. 7/22/24 Plan - orders for nurse augie supplements given c/w wash/vashe clean, sorbact/super absorber/dynaflex lymphedema pump daily  needs weekend help diet, med. adherence, sleeping in bed follow up 3 weeks

## 2024-07-25 NOTE — ASSESSMENT
[FreeTextEntry1] : 75 yr old woman with left leg venous ulcer , afib, HTN lymphedema  follow up after ablation done 1/26/21. for lt leg  data complex - mod lab,xr reviewed risk- surgery mod on ac, tolerated debridment well, improved pain 9/22/21 right leg was edematous last week so unna was applied, no wounds needs new compression garment left leg has been using ioplex s/p excisional debridement re-measured for compression garment 10/20/21 Wound being treated with ioplex, s/p excisional debridement slight periwound maceration noted 11/10/21 Left leg venous ulcer, wound decrease in size Patient has been using ioplex s/p excisional debridement periwound maceration /The patient was positioned to allow for optimization of imaging. The fluorescence imaging device was placed 8-12 cm from the patient and the clinical darkness required for imaging was obtained by a dark drape. The wound measured () on the (). The [email or web address] i:X fluorescence imaging device was used to report presence and location of cyan fluorescence, indicative of bacteria at loads greater than 104 CFU/g in real-time. Images reported to have cyan fluorescence. The wound was mechanically cleansed with Dakins 0.125% and underwent excisional debridement. Fluorescence images acquired after cleansing demonstrated reduction in bacteria.  1/14/22 wound clean and pink, size has hit a plateau according to visiting nurse measurements has been using ioplex 4/6/22 Apligraf number 2 applied today to patient's left leg. Wound bed debrided of bioburden and prepped for product application. 1 of pieces and original size of product) obtained. Total product usage was 7.7 sq. cm), Total waste 36.3sq. cm) due to wound size. Product secured with (steri strips and bolster dressing). Patient to f/u in 1 week.  The patient was positioned to allow for optimization of imaging. The fluorescence imaging device was placed 8-12 cm from the patient and the clinical darkness required for imaging was obtained by a dark drape. The wound measured ----- cm2 on the /left ---leg. The [employer] i:X fluorescence imaging device was used to report presence and location of red or cyan fluorescence, indicative of bacteria at loads greater than 104 CFU/g in real-time. s/p excisional debridement 5/16/22 wound clean, using isabel, distal 1/3 of wound is starting to pull into center of wound 5/23/22 apligraf number 3 applied today to patient's left leg. Wound bed debrided of bioburden and prepped for product application. 1 of pieces and original size of product) obtained. Total product usage was 12 sq. cm), Total waste 32 sq. cm) due to wound size. Product secured with (steri strips and bolster dressing). Patient to f/u in 1 week. The patient was positioned to allow for optimization of imaging. The fluorescence imaging device was placed 8-12 cm from the patient and the clinical darkness required for imaging was obtained by a dark drape. The wound measured TA on the /left ---leg). The [employer] i:X fluorescence imaging device was used to report presence and location of red fluorescence, indicative of bacteria at loads greater than 104 CFU/g in real-time. Images reported to have ---no/red/cyan----fluorescence. The wound was ----mechanically cleansed with vashe and/or underwent excisional debridement. Fluorescence images acquired after cleansing demonstrated reduction in bacteria. 8/1/22 USING PUMP WEARING COMPRESSION RIGHT LEG apligraf number () applied today to patient's (anatomical site). Wound bed debrided of bioburden and prepped for product application. (# of pieces and original size of product) obtained. Total product usage was (x sq. cm), Total waste (x sq. cm) due to wound size. Product secured with (steri strips and bolster dressing). Patient to f/u in 1 week.  11/16/22 puraply number 1 applied today to patient's left leg. Wound bed debrided of bioburden and prepped for product application. 1 of pieces and original size of product) obtained. Total product usage was 9 sq. cm), Total waste 6 due to wound size. Product secured with (steri strips and bolster dressing). Patient to f/u in 1 week. s/p excisional debridement  12/9/22 right leg, s/p evacuation hematoma and underlying venous insufficiency, has puraply application 2 weeks ago, at 6 0'clock island of skin and also in center of wound noted   12/28/2022 puraply number 2 applied today to patient's left leg. Wound bed debrided of bioburden and prepped for product application. 1 of pieces and original size of product) obtained. Total product usage was 9 sq. cm), Total waste 6 due to wound size. Product secured with (steri strips and bolster dressing). Patient to f/u in 1 week. s/p excisional debridement No clinical sign of infection The patient was positioned as follows. The fluorescence imaging device was positioned 8-12 cm from the patient and the clinical darkness required for imaging was obtained. The wound measured by [employer].. The [employer] i:X fluorescence imaging device was used to report presence and location of red or cyan fluorescence, indicative of bacteria at loads greater than 104 CFU/g in real-time. Images reported to be negative. Due to presence of infection causing bacteria the wound was cleansed.e. 2/15/23 puraply number 1 applied today to patient's left leg. Wound bed debrided of bioburden and prepped for product application. 1 of pieces and original size of product obtained. Total product usage was 7.7 sq. cm), Total waste 2.3 sq. cm) due to wound size. Product secured with (steri strips and bolster dressing). Patient to f/u in 1 week.  7/5/23 Wound Assessment and Plan: The patient presents with a wound to the LLE. Patient is s/p vascular procedures. Patient dressing wounds with Ioplex.Swelling noted to bilateral extremities. On exam: LLE Two wounds present Wound beds are pink Periwound maceration s/p mechanical debridement RLE Leg swelling no open wounds blanchable erythema  The patient was positioned to allow for optimization of imaging. The fluorescence imaging device was placed 8-12 cm from the patient and the clinical darkness required for imaging was obtained by a dark drape. The wound measured by TA. The [employer] i:X fluorescence imaging device was used to report presence and location of cyan/red fluorescence, indicative of bacteria at loads greater than 104 CFU/g in real-time. Images reported to have cyan/red fluorescence. The wound was mechanically cleansed with Vashe and underwent mechanical debridement. Fluorescence images acquired after cleansing demonstrated reduction in bacteria. No clinical sign of infection  Wash wound with Dove skin sensitive soap and clean water LLE Applied Ioplex/Xtrasorb/Multilayer compression dressings RLE multilayer compression dressing to reduce swelling Change dressing 3 times per week. Leg elevation as tolerated Encouraged ambulation or exercise. Optimization of nutrition. Offloading to the wound site. Home care orders in place Follow up appointment scheduled for 2-3 weeks 9/18/23 left calf 2 wounds, drainage less The patient was positioned to allow for optimization of imaging. The fluorescence imaging device was placed 8-12 cm from the patient and the clinical darkness required for imaging was obtained by a dark drape. The wound measured ----- cm2 on the /left ---leg/foot). The [employer] i:X fluorescence imaging device was used to report presence and location of red and cyan fluorescence, indicative of bacteria at loads greater than 104 CFU/g in real-time. Images reported to have ---no/red & cyan----fluorescence. The wound was ----mechanically cleansed with vashe and/or underwent excisional debridement. Fluorescence images acquired after cleansing demonstrated reduction in bacteria. s/p excisional debridement has been using ioplex not using pump consistently s/p Left RFA  10/9/23 Patient presents for follow up of LLE wounds accompanied by her family memeber. On exam: Wound with red wound bed Periwound slightly macerated s/p mechanical debridement Today applied Zinc to the periwound Aquacel/Superabsorber to the wound bed K2 multilayer dressing HHA to moistened Ioplex before removing to avoid bleeding from the wound bed.  11/22/23 left leg wound superficial, edges with crusting, s/p excisional debridement has been using ioplex, vashe  right leg slightly warm, edematous - will treat, denies fever, pain The patient was positioned to allow for optimization of imaging. The fluorescence imaging device was placed 8-12 cm from the patient and the clinical darkness required for imaging was obtained by a dark drape. The wound measured   ----- cm2 on the /left ---leg. The [employer] i:X fluorescence imaging device was used to report presence and location of cyan fluorescence, indicative of bacteria at loads greater than 104 CFU/g in real-time. Images reported to have ---no cyan----fluorescence. The wound was ----mechanically cleansed with Dakins 0.125%/Iodine/0.9% saline) and/or underwent excisional debridement.  Fluorescence images acquired after cleansing demonstrated reduction in bacteria.   12/13/23 The patient is seen for follow-up for  LLE  wounds. States she is wearing compression garment. The RLE is swollen- doesn't wear stocking on that side due to inability to put the garment on.  1/10/24    Patient presents with her son for follow up of LLE wound. Patient wearing compression on the RLE and wrapped on the LLE. Patient had US testing performed today on b/l lower extremities. Results of testing discussed with patient and son.  On exam: Wounds have red granulation tissue, layer of yellow slough, wound edges macerated, periwound skin intact. s/p excisional debridement The patient was positioned to allow for optimization of imaging. The fluorescence imaging device was placed 8-12 cm from the patient and the clinical darkness required for imaging was obtained by a dark drape. The wound measured TA. The [email or web address] i:X fluorescence imaging device was used to report presence and location of cyan fluorescence, indicative of bacteria at loads greater than 104 CFU/g in real-time. Images reported to have cyan fluorescence. The wound was mechanically cleansed with Vashe and underwent excisional debridement. Fluorescence images acquired after cleansing demonstrated reduction in bacteria.  Affinity #1 applied today to patients LLE Wounds bed debrided of bioburden and prepped for product application. (1 pieces cut in two and 7 square cm) obtained. Total product usage was (2.96 sq. cm), Total waste (4.04 sq cm) due to wound size. Product secured with (sterile strips and bolster dressing). Maintain wound veil in place until next visit. Keep area dry. Patient has homecare. Has been dressing wound with Ioplex/Aquacel/multilayer dressing. Has a Lymphedema pump , not using daily. Patient to f/u in 2 weeks. 1/24/24 left leg - skin subsitute applied last week, still present on wound bed 1/31/24 s/p excisional debridement of slough, necrotic tissue, periwound hygiene wounds improved with Affinity. Recommend continuation of compression stocking to the RLE Plan for re application of Affinity.  Wound has shown improvement through increased granulation and decreased size)  Wound is free from infection, drainage and necrotic tissue.  (Patient has adequate blood supply as demonstrated by palpable pulses and an GALA of 1  Wound has been treated with standards of care for over 4 weeks including (compression, offloading, debridements.   02/14/2 s/p excisional debridement of slough, necrotic tissue, periwound hygiene wounds improved with Affinity.  The patient was positioned to allow for optimization of imaging. The fluorescence imaging device was placed 8-12 cm from the patient and the clinical darkness required for imaging was obtained by a dark drape.  The [employer] i:X fluorescence imaging device was used to report presence and location of red or cyan fluorescence, indicative of bacteria at loads greater than 104 CFU/g in real-time. Images reported to have ---no/red/cyan----fluorescence. The wound was ----mechanically cleansed with Dakins 0.125%/Iodine/0.9% saline) and/or underwent excisional debridement.  Fluorescence images acquired after cleansing demonstrated reduction in bacteria. No sign of infection. Patient does not smoke. Affinity #2 applied today to patients LLE Wounds bed debrided of bioburden and prepped for product application. (1 pieces cut in two and 7 square cm) obtained. Total product usage was (7 sq. cm), Total waste (0 sq cm) due to wound size. Product secured with (sterile strips and bolster dressing). Maintain wound veil in place until next visit. Keep area dry. Recommend continuation of compression stocking to the RLE   02/28/2024 The patient is seen for follow-up for  LLE  wounds. States she is wearing compression garment. affinity was applied on the last visit On exam: Wounds have red granulation tissue, layer of yellow slough, wound edges macerated, periwound skin intact. s/p excisional debridement Plan for re application of (affinity). Wound has shown improvement through (increased granulation and/or decreased size). Wound is free from infection, drainage and necrotic tissue. (Patient has adequate blood supply as demonstrated by palpable pulses and an GALA of (1.1) from (date). Patient is on a comprehensive diabetic management program with a  Wound has been treated with standards of care for (4) weeks including (compression, offloading, debridements). (skin substitute) number () applied today to patients (anatomical site).   Wound bed debrided of bioburden and prepped for product application.  (# of pieces and original size of product) obtained.  Total product usage was (x sq. cm), Total waste (x sq. cm) due to wound size.  Product secured with (steri strips and bolster dressing). Patient to f/u in 1 week. 3/20/24 Affinity number 1 applied today to patients left lateral calf.   Wound bed debrided of bioburden and prepped for product application.  (# of pieces and original size of product) obtained.  Total product usage was 2 sq. cm), Total waste 5 sq. cm) due to wound size.  Product secured with (steri strips and bolster dressing). Patient to f/u in 1 week. s/p excisional debridement right leg mildly cellulitic - slight warm to touch, no pain 4/10/24 left lateral calf wound had affinity placed 3 weeks ago, wound smaller  mechanical debridement  5/1/24 Patient is here for follow-up for a LLE wound. Physical Exam: Wound is clean, smaller, no need for skin substitute placement. S/P mechanical debridement. Has nurse twice a week.  5/29/24 Pt here for f/u of LLE wound. Accompanied by son. Has home nursing 2x/wk.  No fevers, no new complaints. On exam: BLE edema. Left lateral lower leg wound with mild yellow slough, moderate drainage. Periwound skin is macerated. Excisional debridement performed No evidence of infection  The patient was positioned to allow the best view of the imaging device . The fluorescence imaging device was positioned 8-12 cm from the patient and the clinical darkness required for imaging was obtained. The wound measured by [employer].. The [email or web address] i:X fluorescence imaging device was used to report presence and location of red or cyan fluorescence, indicative of bacteria at loads greater than 104 CFU/g in real-time. Images reported to be positive. Mechanical and excisional debridement performed, resulting in decrease in the fluorescence image.  6/12/24 Patient here for follow up of LLE wound accompanied by her son. On exam: BLE edema Left lateral wound: Wound bed is red  Moderate serous drainage Periwound dry and scaly  Blanchable erythema The patient was positioned to allow the best view of the imaging device . The fluorescence imaging device was positioned 8-12 cm from the patient and the clinical darkness required for imaging was obtained. The wound measured by [employer]. The [email or web address] i:X fluorescence imaging device was used to report presence and location of red or cyan fluorescence, indicative of bacteria at loads greater than 104 CFU/g in real-time. Images reported to be positive. Mechanical and excisional debridement performed, resulting in decrease in the fluorescence image. no s/s of infection s/p mechanical debridement Revyve gel/Drawtex/Fior Encouraged patient to get into bed at night Son will help arrange for patient to get into bed d/w patient regarding her poor sleep hygiene  7/1/2024 Patient present with a new blister to the dorsal aspect of the Left foot. Left lateral wound: Wound bed is red with scant yellow slough Moderate serous drainage Periwound macerated.  Blanchable erythema There is a large blister over the dorsum of the left foot with significant swelling. [facility] imaging revealed no evidence of bacteria. S/P excisional debridement of the LLE lateral  wound and unroofing of the blister. Has nurse only twice weekly. Will increase to 3x/week. Patient continues to sleep in recliner.with legs down. D/w son present and patient to sleep in bed 7/22/24 left calf ulcer larger, started using sorbact 1 week ago drainage is copious, s/p excisional debridement long discussion today about sleeping in chair, diet, medication adherence and timing of diuretic encouraged to keep appt. with dr adan for right leg RFA and edwardo

## 2024-07-25 NOTE — PHYSICAL EXAM
[Normal Rate and Rhythm] : normal rate and rhythm [1+] : left 1+ [Ankle Swelling (On Exam)] : present [Ankle Swelling Bilaterally] : bilaterally  [Ankle Swelling On The Left] : moderate [Varicose Veins Of Lower Extremities] : present [] : present [Skin Ulcer] : ulcer [Alert] : alert [Oriented to Person] : oriented to person [Oriented to Place] : oriented to place [Oriented to Time] : oriented to time [Calm] : calm [Please See PDF for [employer]] : Please See PDF for [employer]. [de-identified] : seated at table at home, good hygiene [de-identified] : camila [de-identified] : non labored [de-identified] : no gross deformities [de-identified] : LLE wound

## 2024-08-04 ENCOUNTER — NON-APPOINTMENT (OUTPATIENT)
Age: 79
End: 2024-08-04

## 2024-08-08 ENCOUNTER — APPOINTMENT (OUTPATIENT)
Dept: VASCULAR SURGERY | Facility: CLINIC | Age: 79
End: 2024-08-08

## 2024-08-14 ENCOUNTER — APPOINTMENT (OUTPATIENT)
Dept: WOUND CARE | Facility: HOSPITAL | Age: 79
End: 2024-08-14
Payer: MEDICARE

## 2024-08-14 ENCOUNTER — OUTPATIENT (OUTPATIENT)
Dept: OUTPATIENT SERVICES | Facility: HOSPITAL | Age: 79
LOS: 1 days | End: 2024-08-14
Payer: MEDICARE

## 2024-08-14 VITALS
HEART RATE: 79 BPM | WEIGHT: 200 LBS | OXYGEN SATURATION: 93 % | DIASTOLIC BLOOD PRESSURE: 78 MMHG | TEMPERATURE: 97.5 F | HEIGHT: 63 IN | SYSTOLIC BLOOD PRESSURE: 126 MMHG | BODY MASS INDEX: 35.44 KG/M2 | RESPIRATION RATE: 20 BRPM

## 2024-08-14 DIAGNOSIS — L97.929 CHRONIC VENOUS HYPERTENSION (IDIOPATHIC) WITH ULCER OF LEFT LOWER EXTREMITY: ICD-10-CM

## 2024-08-14 DIAGNOSIS — I87.312 CHRONIC VENOUS HYPERTENSION (IDIOPATHIC) WITH ULCER OF LEFT LOWER EXTREMITY: ICD-10-CM

## 2024-08-14 PROCEDURE — 11042 DBRDMT SUBQ TIS 1ST 20SQCM/<: CPT

## 2024-08-15 ENCOUNTER — APPOINTMENT (OUTPATIENT)
Dept: VASCULAR SURGERY | Facility: CLINIC | Age: 79
End: 2024-08-15

## 2024-08-16 NOTE — ASSESSMENT
[FreeTextEntry1] : 75 yr old woman with left leg venous ulcer , afib, HTN lymphedema  follow up after ablation done 1/26/21. for lt leg  data complex - mod lab,xr reviewed risk- surgery mod on ac, tolerated debridment well, improved pain 9/22/21 right leg was edematous last week so unna was applied, no wounds needs new compression garment left leg has been using ioplex s/p excisional debridement re-measured for compression garment 10/20/21 Wound being treated with ioplex, s/p excisional debridement slight periwound maceration noted 11/10/21 Left leg venous ulcer, wound decrease in size Patient has been using ioplex s/p excisional debridement periwound maceration /The patient was positioned to allow for optimization of imaging. The fluorescence imaging device was placed 8-12 cm from the patient and the clinical darkness required for imaging was obtained by a dark drape. The wound measured () on the (). The [employer] i:X fluorescence imaging device was used to report presence and location of cyan fluorescence, indicative of bacteria at loads greater than 104 CFU/g in real-time. Images reported to have cyan fluorescence. The wound was mechanically cleansed with Dakins 0.125% and underwent excisional debridement. Fluorescence images acquired after cleansing demonstrated reduction in bacteria.  1/14/22 wound clean and pink, size has hit a plateau according to visiting nurse measurements has been using ioplex 4/6/22 Apligraf number 2 applied today to patient's left leg. Wound bed debrided of bioburden and prepped for product application. 1 of pieces and original size of product) obtained. Total product usage was 7.7 sq. cm), Total waste 36.3sq. cm) due to wound size. Product secured with (steri strips and bolster dressing). Patient to f/u in 1 week.  The patient was positioned to allow for optimization of imaging. The fluorescence imaging device was placed 8-12 cm from the patient and the clinical darkness required for imaging was obtained by a dark drape. The wound measured ----- cm2 on the /left ---leg. The [employer] i:X fluorescence imaging device was used to report presence and location of red or cyan fluorescence, indicative of bacteria at loads greater than 104 CFU/g in real-time. s/p excisional debridement 5/16/22 wound clean, using isabel, distal 1/3 of wound is starting to pull into center of wound 5/23/22 apligraf number 3 applied today to patient's left leg. Wound bed debrided of bioburden and prepped for product application. 1 of pieces and original size of product) obtained. Total product usage was 12 sq. cm), Total waste 32 sq. cm) due to wound size. Product secured with (steri strips and bolster dressing). Patient to f/u in 1 week. The patient was positioned to allow for optimization of imaging. The fluorescence imaging device was placed 8-12 cm from the patient and the clinical darkness required for imaging was obtained by a dark drape. The wound measured TA on the /left ---leg). The [employer] i:X fluorescence imaging device was used to report presence and location of red fluorescence, indicative of bacteria at loads greater than 104 CFU/g in real-time. Images reported to have ---no/red/cyan----fluorescence. The wound was ----mechanically cleansed with vashe and/or underwent excisional debridement. Fluorescence images acquired after cleansing demonstrated reduction in bacteria. 8/1/22 USING PUMP WEARING COMPRESSION RIGHT LEG apligraf number () applied today to patient's (anatomical site). Wound bed debrided of bioburden and prepped for product application. (# of pieces and original size of product) obtained. Total product usage was (x sq. cm), Total waste (x sq. cm) due to wound size. Product secured with (steri strips and bolster dressing). Patient to f/u in 1 week.  11/16/22 puraply number 1 applied today to patient's left leg. Wound bed debrided of bioburden and prepped for product application. 1 of pieces and original size of product) obtained. Total product usage was 9 sq. cm), Total waste 6 due to wound size. Product secured with (steri strips and bolster dressing). Patient to f/u in 1 week. s/p excisional debridement  12/9/22 right leg, s/p evacuation hematoma and underlying venous insufficiency, has puraply application 2 weeks ago, at 6 0'clock island of skin and also in center of wound noted   12/28/2022 puraply number 2 applied today to patient's left leg. Wound bed debrided of bioburden and prepped for product application. 1 of pieces and original size of product) obtained. Total product usage was 9 sq. cm), Total waste 6 due to wound size. Product secured with (steri strips and bolster dressing). Patient to f/u in 1 week. s/p excisional debridement No clinical sign of infection The patient was positioned as follows. The fluorescence imaging device was positioned 8-12 cm from the patient and the clinical darkness required for imaging was obtained. The wound measured by Tissue [employer].. The [employer] i:X fluorescence imaging device was used to report presence and location of red or cyan fluorescence, indicative of bacteria at loads greater than 104 CFU/g in real-time. Images reported to be negative. Due to presence of infection causing bacteria the wound was cleansed.e. 2/15/23 puraply number 1 applied today to patient's left leg. Wound bed debrided of bioburden and prepped for product application. 1 of pieces and original size of product obtained. Total product usage was 7.7 sq. cm), Total waste 2.3 sq. cm) due to wound size. Product secured with (steri strips and bolster dressing). Patient to f/u in 1 week.  7/5/23 Wound Assessment and Plan: The patient presents with a wound to the LLE. Patient is s/p vascular procedures. Patient dressing wounds with Ioplex.Swelling noted to bilateral extremities. On exam: LLE Two wounds present Wound beds are pink Periwound maceration s/p mechanical debridement RLE Leg swelling no open wounds blanchable erythema  The patient was positioned to allow for optimization of imaging. The fluorescence imaging device was placed 8-12 cm from the patient and the clinical darkness required for imaging was obtained by a dark drape. The wound measured by TA. The [employer] i:X fluorescence imaging device was used to report presence and location of cyan/red fluorescence, indicative of bacteria at loads greater than 104 CFU/g in real-time. Images reported to have cyan/red fluorescence. The wound was mechanically cleansed with Vashe and underwent mechanical debridement. Fluorescence images acquired after cleansing demonstrated reduction in bacteria. No clinical sign of infection  Wash wound with Dove skin sensitive soap and clean water LLE Applied Ioplex/Xtrasorb/Multilayer compression dressings RLE multilayer compression dressing to reduce swelling Change dressing 3 times per week. Leg elevation as tolerated Encouraged ambulation or exercise. Optimization of nutrition. Offloading to the wound site. Home care orders in place Follow up appointment scheduled for 2-3 weeks 9/18/23 left calf 2 wounds, drainage less The patient was positioned to allow for optimization of imaging. The fluorescence imaging device was placed 8-12 cm from the patient and the clinical darkness required for imaging was obtained by a dark drape. The wound measured ----- cm2 on the /left ---leg/foot). The [employer] i:X fluorescence imaging device was used to report presence and location of red and cyan fluorescence, indicative of bacteria at loads greater than 104 CFU/g in real-time. Images reported to have ---no/red & cyan----fluorescence. The wound was ----mechanically cleansed with vashe and/or underwent excisional debridement. Fluorescence images acquired after cleansing demonstrated reduction in bacteria. s/p excisional debridement has been using ioplex not using pump consistently s/p Left RFA  10/9/23 Patient presents for follow up of LLE wounds accompanied by her family memeber. On exam: Wound with red wound bed Periwound slightly macerated s/p mechanical debridement Today applied Zinc to the periwound Aquacel/Superabsorber to the wound bed K2 multilayer dressing HHA to moistened Ioplex before removing to avoid bleeding from the wound bed.  11/22/23 left leg wound superficial, edges with crusting, s/p excisional debridement has been using ioplex, vashe  right leg slightly warm, edematous - will treat, denies fever, pain The patient was positioned to allow for optimization of imaging. The fluorescence imaging device was placed 8-12 cm from the patient and the clinical darkness required for imaging was obtained by a dark drape. The wound measured   ----- cm2 on the /left ---leg. The [employer] i:X fluorescence imaging device was used to report presence and location of cyan fluorescence, indicative of bacteria at loads greater than 104 CFU/g in real-time. Images reported to have ---no cyan----fluorescence. The wound was ----mechanically cleansed with Dakins 0.125%/Iodine/0.9% saline) and/or underwent excisional debridement.  Fluorescence images acquired after cleansing demonstrated reduction in bacteria.   12/13/23 The patient is seen for follow-up for  LLE  wounds. States she is wearing compression garment. The RLE is swollen- doesn't wear stocking on that side due to inability to put the garment on.  1/10/24    Patient presents with her son for follow up of LLE wound. Patient wearing compression on the RLE and wrapped on the LLE. Patient had US testing performed today on b/l lower extremities. Results of testing discussed with patient and son.  On exam: Wounds have red granulation tissue, layer of yellow slough, wound edges macerated, periwound skin intact. s/p excisional debridement The patient was positioned to allow for optimization of imaging. The fluorescence imaging device was placed 8-12 cm from the patient and the clinical darkness required for imaging was obtained by a dark drape. The wound measured TA. The [employer] i:X fluorescence imaging device was used to report presence and location of cyan fluorescence, indicative of bacteria at loads greater than 104 CFU/g in real-time. Images reported to have cyan fluorescence. The wound was mechanically cleansed with Vashe and underwent excisional debridement. Fluorescence images acquired after cleansing demonstrated reduction in bacteria.  Affinity #1 applied today to patients LLE Wounds bed debrided of bioburden and prepped for product application. (1 pieces cut in two and 7 square cm) obtained. Total product usage was (2.96 sq. cm), Total waste (4.04 sq cm) due to wound size. Product secured with (sterile strips and bolster dressing). Maintain wound veil in place until next visit. Keep area dry. Patient has homecare. Has been dressing wound with Ioplex/Aquacel/multilayer dressing. Has a Lymphedema pump , not using daily. Patient to f/u in 2 weeks. 1/24/24 left leg - skin subsitute applied last week, still present on wound bed 1/31/24 s/p excisional debridement of slough, necrotic tissue, periwound hygiene wounds improved with Affinity. Recommend continuation of compression stocking to the RLE Plan for re application of Affinity.  Wound has shown improvement through increased granulation and decreased size)  Wound is free from infection, drainage and necrotic tissue.  (Patient has adequate blood supply as demonstrated by palpable pulses and an GALA of 1  Wound has been treated with standards of care for over 4 weeks including (compression, offloading, debridements.   02/14/2 s/p excisional debridement of slough, necrotic tissue, periwound hygiene wounds improved with Affinity.  The patient was positioned to allow for optimization of imaging. The fluorescence imaging device was placed 8-12 cm from the patient and the clinical darkness required for imaging was obtained by a dark drape.  The [employer] i:X fluorescence imaging device was used to report presence and location of red or cyan fluorescence, indicative of bacteria at loads greater than 104 CFU/g in real-time. Images reported to have ---no/red/cyan----fluorescence. The wound was ----mechanically cleansed with Dakins 0.125%/Iodine/0.9% saline) and/or underwent excisional debridement.  Fluorescence images acquired after cleansing demonstrated reduction in bacteria. No sign of infection. Patient does not smoke. Affinity #2 applied today to patients LLE Wounds bed debrided of bioburden and prepped for product application. (1 pieces cut in two and 7 square cm) obtained. Total product usage was (7 sq. cm), Total waste (0 sq cm) due to wound size. Product secured with (sterile strips and bolster dressing). Maintain wound veil in place until next visit. Keep area dry. Recommend continuation of compression stocking to the RLE   02/28/2024 The patient is seen for follow-up for  LLE  wounds. States she is wearing compression garment. affinity was applied on the last visit On exam: Wounds have red granulation tissue, layer of yellow slough, wound edges macerated, periwound skin intact. s/p excisional debridement Plan for re application of (affinity). Wound has shown improvement through (increased granulation and/or decreased size). Wound is free from infection, drainage and necrotic tissue. (Patient has adequate blood supply as demonstrated by palpable pulses and an GALA of (1.1) from (date). Patient is on a comprehensive diabetic management program with a  Wound has been treated with standards of care for (4) weeks including (compression, offloading, debridements). (skin substitute) number () applied today to patients (anatomical site).   Wound bed debrided of bioburden and prepped for product application.  (# of pieces and original size of product) obtained.  Total product usage was (x sq. cm), Total waste (x sq. cm) due to wound size.  Product secured with (steri strips and bolster dressing). Patient to f/u in 1 week. 3/20/24 Affinity number 1 applied today to patients left lateral calf.   Wound bed debrided of bioburden and prepped for product application.  (# of pieces and original size of product) obtained.  Total product usage was 2 sq. cm), Total waste 5 sq. cm) due to wound size.  Product secured with (steri strips and bolster dressing). Patient to f/u in 1 week. s/p excisional debridement right leg mildly cellulitic - slight warm to touch, no pain 4/10/24 left lateral calf wound had affinity placed 3 weeks ago, wound smaller  mechanical debridement  5/1/24 Patient is here for follow-up for a LLE wound. Physical Exam: Wound is clean, smaller, no need for skin substitute placement. S/P mechanical debridement. Has nurse twice a week.  5/29/24 Pt here for f/u of LLE wound. Accompanied by son. Has home nursing 2x/wk.  No fevers, no new complaints. On exam: BLE edema. Left lateral lower leg wound with mild yellow slough, moderate drainage. Periwound skin is macerated. Excisional debridement performed No evidence of infection  The patient was positioned to allow the best view of the imaging device . The fluorescence imaging device was positioned 8-12 cm from the patient and the clinical darkness required for imaging was obtained. The wound measured by Tissue sezmi.. The [employer] i:X fluorescence imaging device was used to report presence and location of red or cyan fluorescence, indicative of bacteria at loads greater than 104 CFU/g in real-time. Images reported to be positive. Mechanical and excisional debridement performed, resulting in decrease in the fluorescence image.  6/12/24 Patient here for follow up of LLE wound accompanied by her son. On exam: BLE edema Left lateral wound: Wound bed is red  Moderate serous drainage Periwound dry and scaly  Blanchable erythema The patient was positioned to allow the best view of the imaging device . The fluorescence imaging device was positioned 8-12 cm from the patient and the clinical darkness required for imaging was obtained. The wound measured by Tissue sezmi. The [employer] i:X fluorescence imaging device was used to report presence and location of red or cyan fluorescence, indicative of bacteria at loads greater than 104 CFU/g in real-time. Images reported to be positive. Mechanical and excisional debridement performed, resulting in decrease in the fluorescence image. no s/s of infection s/p mechanical debridement Revyve gel/Drawtex/Fior Encouraged patient to get into bed at night Son will help arrange for patient to get into bed d/w patient regarding her poor sleep hygiene  7/1/2024 Patient present with a new blister to the dorsal aspect of the Left foot. Left lateral wound: Wound bed is red with scant yellow slough Moderate serous drainage Periwound macerated.  Blanchable erythema There is a large blister over the dorsum of the left foot with significant swelling. [facility] imaging revealed no evidence of bacteria. S/P excisional debridement of the LLE lateral  wound and unroofing of the blister. Has nurse only twice weekly. Will increase to 3x/week. Patient continues to sleep in recliner.with legs down. D/w son present and patient to sleep in bed 7/22/24 left calf ulcer larger, started using sorbact 1 week ago drainage is copious, s/p excisional debridement long discussion today about sleeping in chair, diet, medication adherence and timing of diuretic encouraged to keep appt. with dr adan for right leg RFA and varithena 8/14/24 Patient is here for follow-up for a LLE wound.  Completed a course of Ciprofloxacin 3 days ago. Reports continuous copious drainage. Doesn't elevate her leg- sleeps in a recliner. Physical Exam: Wound has a moderate amount of yellow slough. Appears to be larger in size. S/P excisional debridement. Wound washed with soap and water. Moleculight imaging shows no infection. Skin substitute- Apligraf ordered.

## 2024-08-16 NOTE — PHYSICAL EXAM
[Normal Rate and Rhythm] : normal rate and rhythm [1+] : left 1+ [Ankle Swelling (On Exam)] : present [Ankle Swelling Bilaterally] : bilaterally  [Ankle Swelling On The Left] : moderate [Varicose Veins Of Lower Extremities] : present [] : present [Skin Ulcer] : ulcer [Alert] : alert [Oriented to Person] : oriented to person [Oriented to Place] : oriented to place [Oriented to Time] : oriented to time [Calm] : calm [Please See PDF for [employer]] : Please See PDF for [employer]. [de-identified] : seated at table at home, good hygiene [de-identified] : camila [de-identified] : non labored [de-identified] : no gross deformities [de-identified] : LLE wound

## 2024-08-16 NOTE — PLAN
[FreeTextEntry1] : 9/18/23 Plan - shower wound, scrub misael wound skin well as this area positive on moleculight use pump daily, 45 minutes can c/w ioplex, will ask nurse to get acticoat, needs to wipe down leg and wound with vashe before applying products xtrasorb/dynaflex follow up 2-3 weeks  10/6/23 Plan: Moistened Ioplex with water to remove if it adheres to the wound bed. Plan - shower wound, scrub misael wound skin well  use pump daily, 45 minutes WAsh wound with Vashe, apply Zinc oxide to the misael wound Ioplex, xtrasorb/dynaflex follow up 2-3 weeks Nursing orders given. 11/22/23 Plan - orders for nurse c/w ioplex/super absorber/dynaflex follow up 2-3 weeks 12/13/23 Recommended  using compression garment on the RLE Continue compression garment on the LLE. Repeat vascular studies. Ioplex to LLE wounds. Compression wrap to LLE. The patient was positioned as follows .  The fluorescence imaging device was positioned 8-12 cm from the patient and the clinical darkness required for imaging was obtained.  The wound measured by Tissue [employer]..  The [employer] i:X fluorescence imaging device was used to report presence and location of red or cyan fluorescence, indicative of bacteria at loads greater than 104 CFU/g in real-time.  Images reported to be positive mechanical washed.  Due to presence of infection causing bacteria the wound was cleansed.  Fluorescence images acquired after cleansing reported no reduction in bacteria..  I then turned my attention to debridement, resulting in decrease in the fluorescence image. measured for compression grment vascular test to be scheduled  1/10/24 Plan: Maintain wound veil in place. Do not remove wound veil Keep area dry Nursing orders given Patient measured for Circaids on the next visit. RTO in two weeks   1/14/24 Plan - veil to be left in place, change above follow up in 2 weeks nursing orders given  02/28/2024 isabel, xtrasorb, multilayer dressing follow up in 2-3 weeks nursing orders given Affinity will be ordered 3/20/24 Plan - skin sub. applied/veil over/dynaflex follow up 2 weeks orders for nurse keflex to pharmacy for right leg 4/10/24 Plan - order skin sub. orders for nurse  resume showering, isabel/aquacel/dynaflex follow up 3 weeks  5/1/24 S/P mechanical debridement. Moisturize daily. Isabel/ Aqua Cell/Dynaplex. Nurses' orders given. Follow-up 3-4 weeks.  5/29/24 Plan: Today applied Cavilon advanced to periwound skin on LLE. BLE: Cleanse with vashe, do not rinse. Moisturize intact skin LLE: Drawtex to wound bed. Multilayer compression RLE: Today multiplayer compression wrap - remove at next nursing visit. Then compression garment daily Nursing orders provided Recommend follow-up with Dr. Chavez for evaluation of RLE veins Follow up in 3-4weeks.  6/12/24 Plan: Cavilon to the periwound Revyve gel/Drawtex/Fior Patient has an appt today with Dr. Chavez. Will be wrapped with K2 after the visit today. Follow up in two weeks.  7/1/24 Plan: Increase nurse's visit to 3x/week.  Wash wound with soap and water. S/P excisional debridement of the LLE lateral wound. Blister over the dorsum of the left foot was unroofed.  Cavilon to the misael-wound. K2 wrap. Continue with compression stockings. Follow-up visit in 3-4 weeks. 7/22/24 Plan - orders for nurse augie supplements given c/w wash/vashe clean, sorbact/super absorber/dynaflex lymphedema pump daily  needs weekend help diet, med. adherence, sleeping in bed follow up 3 weeks. 8/14/24 Plan: S/P excisional debridement. Wash with Vashe.  Zinc to the misael- wound. Drawtex. Dynaflex wrap applied. Strongly encouraged to elevate the leg. Follow-up in one week.

## 2024-08-16 NOTE — PLAN
[FreeTextEntry1] : 9/18/23 Plan - shower wound, scrub misael wound skin well as this area positive on moleculight use pump daily, 45 minutes can c/w ioplex, will ask nurse to get acticoat, needs to wipe down leg and wound with vashe before applying products xtrasorb/dynaflex follow up 2-3 weeks  10/6/23 Plan: Moistened Ioplex with water to remove if it adheres to the wound bed. Plan - shower wound, scrub misael wound skin well  use pump daily, 45 minutes WAsh wound with Vashe, apply Zinc oxide to the misael wound Ioplex, xtrasorb/dynaflex follow up 2-3 weeks Nursing orders given. 11/22/23 Plan - orders for nurse c/w ioplex/super absorber/dynaflex follow up 2-3 weeks 12/13/23 Recommended  using compression garment on the RLE Continue compression garment on the LLE. Repeat vascular studies. Ioplex to LLE wounds. Compression wrap to LLE. The patient was positioned as follows .  The fluorescence imaging device was positioned 8-12 cm from the patient and the clinical darkness required for imaging was obtained.  The wound measured by [employer]..  The Plain Vanilla i:X fluorescence imaging device was used to report presence and location of red or cyan fluorescence, indicative of bacteria at loads greater than 104 CFU/g in real-time.  Images reported to be positive mechanical washed.  Due to presence of infection causing bacteria the wound was cleansed.  Fluorescence images acquired after cleansing reported no reduction in bacteria..  I then turned my attention to debridement, resulting in decrease in the fluorescence image. measured for compression grment vascular test to be scheduled  1/10/24 Plan: Maintain wound veil in place. Do not remove wound veil Keep area dry Nursing orders given Patient measured for Circaids on the next visit. RTO in two weeks   1/14/24 Plan - veil to be left in place, change above follow up in 2 weeks nursing orders given  02/28/2024 isabel, xtrasorb, multilayer dressing follow up in 2-3 weeks nursing orders given Affinity will be ordered 3/20/24 Plan - skin sub. applied/veil over/dynaflex follow up 2 weeks orders for nurse keflex to pharmacy for right leg 4/10/24 Plan - order skin sub. orders for nurse  resume showering, isabel/aquacel/dynaflex follow up 3 weeks  5/1/24 S/P mechanical debridement. Moisturize daily. Isabel/ Aqua Cell/Dynaplex. Nurses' orders given. Follow-up 3-4 weeks.  5/29/24 Plan: Today applied Cavilon advanced to periwound skin on LLE. BLE: Cleanse with vashe, do not rinse. Moisturize intact skin LLE: Drawtex to wound bed. Multilayer compression RLE: Today multiplayer compression wrap - remove at next nursing visit. Then compression garment daily Nursing orders provided Recommend follow-up with Dr. Chavez for evaluation of RLE veins Follow up in 3-4weeks.  6/12/24 Plan: Cavilon to the periwound Revyve gel/Drawtex/Fior Patient has an appt today with Dr. Chavez. Will be wrapped with K2 after the visit today. Follow up in two weeks.  7/1/24 Plan: Increase nurse's visit to 3x/week.  Wash wound with soap and water. S/P excisional debridement of the LLE lateral wound. Blister over the dorsum of the left foot was unroofed.  Cavilon to the misael-wound. K2 wrap. Continue with compression stockings. Follow-up visit in 3-4 weeks. 7/22/24 Plan - orders for nurse augie supplements given c/w wash/vashe clean, sorbact/super absorber/dynaflex lymphedema pump daily  needs weekend help diet, med. adherence, sleeping in bed follow up 3 weeks. 8/14/24 Plan: S/P excisional debridement. Wash with Vashe.  Zinc to the misael- wound. Drawtex. Dynaflex wrap applied. Strongly encouraged to elevate the leg. Follow-up in one week.

## 2024-08-16 NOTE — ASSESSMENT
[FreeTextEntry1] : 75 yr old woman with left leg venous ulcer , afib, HTN lymphedema  follow up after ablation done 1/26/21. for lt leg  data complex - mod lab,xr reviewed risk- surgery mod on ac, tolerated debridment well, improved pain 9/22/21 right leg was edematous last week so unna was applied, no wounds needs new compression garment left leg has been using ioplex s/p excisional debridement re-measured for compression garment 10/20/21 Wound being treated with ioplex, s/p excisional debridement slight periwound maceration noted 11/10/21 Left leg venous ulcer, wound decrease in size Patient has been using ioplex s/p excisional debridement periwound maceration /The patient was positioned to allow for optimization of imaging. The fluorescence imaging device was placed 8-12 cm from the patient and the clinical darkness required for imaging was obtained by a dark drape. The wound measured () on the (). The Local [employer] Launch i:X fluorescence imaging device was used to report presence and location of cyan fluorescence, indicative of bacteria at loads greater than 104 CFU/g in real-time. Images reported to have cyan fluorescence. The wound was mechanically cleansed with Dakins 0.125% and underwent excisional debridement. Fluorescence images acquired after cleansing demonstrated reduction in bacteria.  1/14/22 wound clean and pink, size has hit a plateau according to visiting nurse measurements has been using ioplex 4/6/22 Apligraf number 2 applied today to patient's left leg. Wound bed debrided of bioburden and prepped for product application. 1 of pieces and original size of product) obtained. Total product usage was 7.7 sq. cm), Total waste 36.3sq. cm) due to wound size. Product secured with (steri strips and bolster dressing). Patient to f/u in 1 week.  The patient was positioned to allow for optimization of imaging. The fluorescence imaging device was placed 8-12 cm from the patient and the clinical darkness required for imaging was obtained by a dark drape. The wound measured ----- cm2 on the /left ---leg. The servtagt i:X fluorescence imaging device was used to report presence and location of red or cyan fluorescence, indicative of bacteria at loads greater than 104 CFU/g in real-time. s/p excisional debridement 5/16/22 wound clean, using isabel, distal 1/3 of wound is starting to pull into center of wound 5/23/22 apligraf number 3 applied today to patient's left leg. Wound bed debrided of bioburden and prepped for product application. 1 of pieces and original size of product) obtained. Total product usage was 12 sq. cm), Total waste 32 sq. cm) due to wound size. Product secured with (steri strips and bolster dressing). Patient to f/u in 1 week. The patient was positioned to allow for optimization of imaging. The fluorescence imaging device was placed 8-12 cm from the patient and the clinical darkness required for imaging was obtained by a dark drape. The wound measured TA on the /left ---leg). The Local [employer] Launch i:X fluorescence imaging device was used to report presence and location of red fluorescence, indicative of bacteria at loads greater than 104 CFU/g in real-time. Images reported to have ---no/red/cyan----fluorescence. The wound was ----mechanically cleansed with vashe and/or underwent excisional debridement. Fluorescence images acquired after cleansing demonstrated reduction in bacteria. 8/1/22 USING PUMP WEARING COMPRESSION RIGHT LEG apligraf number () applied today to patient's (anatomical site). Wound bed debrided of bioburden and prepped for product application. (# of pieces and original size of product) obtained. Total product usage was (x sq. cm), Total waste (x sq. cm) due to wound size. Product secured with (steri strips and bolster dressing). Patient to f/u in 1 week.  11/16/22 puraply number 1 applied today to patient's left leg. Wound bed debrided of bioburden and prepped for product application. 1 of pieces and original size of product) obtained. Total product usage was 9 sq. cm), Total waste 6 due to wound size. Product secured with (steri strips and bolster dressing). Patient to f/u in 1 week. s/p excisional debridement  12/9/22 right leg, s/p evacuation hematoma and underlying venous insufficiency, has puraply application 2 weeks ago, at 6 0'clock island of skin and also in center of wound noted   12/28/2022 puraply number 2 applied today to patient's left leg. Wound bed debrided of bioburden and prepped for product application. 1 of pieces and original size of product) obtained. Total product usage was 9 sq. cm), Total waste 6 due to wound size. Product secured with (steri strips and bolster dressing). Patient to f/u in 1 week. s/p excisional debridement No clinical sign of infection The patient was positioned as follows. The fluorescence imaging device was positioned 8-12 cm from the patient and the clinical darkness required for imaging was obtained. The wound measured by Tissue [employer].. The Local [employer] Launch i:X fluorescence imaging device was used to report presence and location of red or cyan fluorescence, indicative of bacteria at loads greater than 104 CFU/g in real-time. Images reported to be negative. Due to presence of infection causing bacteria the wound was cleansed.e. 2/15/23 puraply number 1 applied today to patient's left leg. Wound bed debrided of bioburden and prepped for product application. 1 of pieces and original size of product obtained. Total product usage was 7.7 sq. cm), Total waste 2.3 sq. cm) due to wound size. Product secured with (steri strips and bolster dressing). Patient to f/u in 1 week.  7/5/23 Wound Assessment and Plan: The patient presents with a wound to the LLE. Patient is s/p vascular procedures. Patient dressing wounds with Ioplex.Swelling noted to bilateral extremities. On exam: LLE Two wounds present Wound beds are pink Periwound maceration s/p mechanical debridement RLE Leg swelling no open wounds blanchable erythema  The patient was positioned to allow for optimization of imaging. The fluorescence imaging device was placed 8-12 cm from the patient and the clinical darkness required for imaging was obtained by a dark drape. The wound measured by TA. The Local [employer] Launch i:X fluorescence imaging device was used to report presence and location of cyan/red fluorescence, indicative of bacteria at loads greater than 104 CFU/g in real-time. Images reported to have cyan/red fluorescence. The wound was mechanically cleansed with Vashe and underwent mechanical debridement. Fluorescence images acquired after cleansing demonstrated reduction in bacteria. No clinical sign of infection  Wash wound with Dove skin sensitive soap and clean water LLE Applied Ioplex/Xtrasorb/Multilayer compression dressings RLE multilayer compression dressing to reduce swelling Change dressing 3 times per week. Leg elevation as tolerated Encouraged ambulation or exercise. Optimization of nutrition. Offloading to the wound site. Home care orders in place Follow up appointment scheduled for 2-3 weeks 9/18/23 left calf 2 wounds, drainage less The patient was positioned to allow for optimization of imaging. The fluorescence imaging device was placed 8-12 cm from the patient and the clinical darkness required for imaging was obtained by a dark drape. The wound measured ----- cm2 on the /left ---leg/foot). The servtagt i:X fluorescence imaging device was used to report presence and location of red and cyan fluorescence, indicative of bacteria at loads greater than 104 CFU/g in real-time. Images reported to have ---no/red & cyan----fluorescence. The wound was ----mechanically cleansed with vashe and/or underwent excisional debridement. Fluorescence images acquired after cleansing demonstrated reduction in bacteria. s/p excisional debridement has been using ioplex not using pump consistently s/p Left RFA  10/9/23 Patient presents for follow up of LLE wounds accompanied by her family memeber. On exam: Wound with red wound bed Periwound slightly macerated s/p mechanical debridement Today applied Zinc to the periwound Aquacel/Superabsorber to the wound bed K2 multilayer dressing HHA to moistened Ioplex before removing to avoid bleeding from the wound bed.  11/22/23 left leg wound superficial, edges with crusting, s/p excisional debridement has been using ioplex, vashe  right leg slightly warm, edematous - will treat, denies fever, pain The patient was positioned to allow for optimization of imaging. The fluorescence imaging device was placed 8-12 cm from the patient and the clinical darkness required for imaging was obtained by a dark drape. The wound measured   ----- cm2 on the /left ---leg. The servtagt i:X fluorescence imaging device was used to report presence and location of cyan fluorescence, indicative of bacteria at loads greater than 104 CFU/g in real-time. Images reported to have ---no cyan----fluorescence. The wound was ----mechanically cleansed with Dakins 0.125%/Iodine/0.9% saline) and/or underwent excisional debridement.  Fluorescence images acquired after cleansing demonstrated reduction in bacteria.   12/13/23 The patient is seen for follow-up for  LLE  wounds. States she is wearing compression garment. The RLE is swollen- doesn't wear stocking on that side due to inability to put the garment on.  1/10/24    Patient presents with her son for follow up of LLE wound. Patient wearing compression on the RLE and wrapped on the LLE. Patient had US testing performed today on b/l lower extremities. Results of testing discussed with patient and son.  On exam: Wounds have red granulation tissue, layer of yellow slough, wound edges macerated, periwound skin intact. s/p excisional debridement The patient was positioned to allow for optimization of imaging. The fluorescence imaging device was placed 8-12 cm from the patient and the clinical darkness required for imaging was obtained by a dark drape. The wound measured TA. The Local [employer] Launch i:X fluorescence imaging device was used to report presence and location of cyan fluorescence, indicative of bacteria at loads greater than 104 CFU/g in real-time. Images reported to have cyan fluorescence. The wound was mechanically cleansed with Vashe and underwent excisional debridement. Fluorescence images acquired after cleansing demonstrated reduction in bacteria.  Affinity #1 applied today to patients LLE Wounds bed debrided of bioburden and prepped for product application. (1 pieces cut in two and 7 square cm) obtained. Total product usage was (2.96 sq. cm), Total waste (4.04 sq cm) due to wound size. Product secured with (sterile strips and bolster dressing). Maintain wound veil in place until next visit. Keep area dry. Patient has homecare. Has been dressing wound with Ioplex/Aquacel/multilayer dressing. Has a Lymphedema pump , not using daily. Patient to f/u in 2 weeks. 1/24/24 left leg - skin subsitute applied last week, still present on wound bed 1/31/24 s/p excisional debridement of slough, necrotic tissue, periwound hygiene wounds improved with Affinity. Recommend continuation of compression stocking to the RLE Plan for re application of Affinity.  Wound has shown improvement through increased granulation and decreased size)  Wound is free from infection, drainage and necrotic tissue.  (Patient has adequate blood supply as demonstrated by palpable pulses and an GALA of 1  Wound has been treated with standards of care for over 4 weeks including (compression, offloading, debridements.   02/14/2 s/p excisional debridement of slough, necrotic tissue, periwound hygiene wounds improved with Affinity.  The patient was positioned to allow for optimization of imaging. The fluorescence imaging device was placed 8-12 cm from the patient and the clinical darkness required for imaging was obtained by a dark drape.  The Local Market Launch i:X fluorescence imaging device was used to report presence and location of red or cyan fluorescence, indicative of bacteria at loads greater than 104 CFU/g in real-time. Images reported to have ---no/red/cyan----fluorescence. The wound was ----mechanically cleansed with Dakins 0.125%/Iodine/0.9% saline) and/or underwent excisional debridement.  Fluorescence images acquired after cleansing demonstrated reduction in bacteria. No sign of infection. Patient does not smoke. Affinity #2 applied today to patients LLE Wounds bed debrided of bioburden and prepped for product application. (1 pieces cut in two and 7 square cm) obtained. Total product usage was (7 sq. cm), Total waste (0 sq cm) due to wound size. Product secured with (sterile strips and bolster dressing). Maintain wound veil in place until next visit. Keep area dry. Recommend continuation of compression stocking to the RLE   02/28/2024 The patient is seen for follow-up for  LLE  wounds. States she is wearing compression garment. affinity was applied on the last visit On exam: Wounds have red granulation tissue, layer of yellow slough, wound edges macerated, periwound skin intact. s/p excisional debridement Plan for re application of (affinity). Wound has shown improvement through (increased granulation and/or decreased size). Wound is free from infection, drainage and necrotic tissue. (Patient has adequate blood supply as demonstrated by palpable pulses and an GALA of (1.1) from (date). Patient is on a comprehensive diabetic management program with a  Wound has been treated with standards of care for (4) weeks including (compression, offloading, debridements). (skin substitute) number () applied today to patients (anatomical site).   Wound bed debrided of bioburden and prepped for product application.  (# of pieces and original size of product) obtained.  Total product usage was (x sq. cm), Total waste (x sq. cm) due to wound size.  Product secured with (steri strips and bolster dressing). Patient to f/u in 1 week. 3/20/24 Affinity number 1 applied today to patients left lateral calf.   Wound bed debrided of bioburden and prepped for product application.  (# of pieces and original size of product) obtained.  Total product usage was 2 sq. cm), Total waste 5 sq. cm) due to wound size.  Product secured with (steri strips and bolster dressing). Patient to f/u in 1 week. s/p excisional debridement right leg mildly cellulitic - slight warm to touch, no pain 4/10/24 left lateral calf wound had affinity placed 3 weeks ago, wound smaller  mechanical debridement  5/1/24 Patient is here for follow-up for a LLE wound. Physical Exam: Wound is clean, smaller, no need for skin substitute placement. S/P mechanical debridement. Has nurse twice a week.  5/29/24 Pt here for f/u of LLE wound. Accompanied by son. Has home nursing 2x/wk.  No fevers, no new complaints. On exam: BLE edema. Left lateral lower leg wound with mild yellow slough, moderate drainage. Periwound skin is macerated. Excisional debridement performed No evidence of infection  The patient was positioned to allow the best view of the imaging device . The fluorescence imaging device was positioned 8-12 cm from the patient and the clinical darkness required for imaging was obtained. The wound measured by Tissue [email or web address].. The Local Market Launch i:X fluorescence imaging device was used to report presence and location of red or cyan fluorescence, indicative of bacteria at loads greater than 104 CFU/g in real-time. Images reported to be positive. Mechanical and excisional debridement performed, resulting in decrease in the fluorescence image.  6/12/24 Patient here for follow up of LLE wound accompanied by her son. On exam: BLE edema Left lateral wound: Wound bed is red  Moderate serous drainage Periwound dry and scaly  Blanchable erythema The patient was positioned to allow the best view of the imaging device . The fluorescence imaging device was positioned 8-12 cm from the patient and the clinical darkness required for imaging was obtained. The wound measured by [email or web address]. The servtagt i:X fluorescence imaging device was used to report presence and location of red or cyan fluorescence, indicative of bacteria at loads greater than 104 CFU/g in real-time. Images reported to be positive. Mechanical and excisional debridement performed, resulting in decrease in the fluorescence image. no s/s of infection s/p mechanical debridement Revyve gel/Drawtex/Fior Encouraged patient to get into bed at night Son will help arrange for patient to get into bed d/w patient regarding her poor sleep hygiene  7/1/2024 Patient present with a new blister to the dorsal aspect of the Left foot. Left lateral wound: Wound bed is red with scant yellow slough Moderate serous drainage Periwound macerated.  Blanchable erythema There is a large blister over the dorsum of the left foot with significant swelling. [facility] [facility] imaging revealed no evidence of bacteria. S/P excisional debridement of the LLE lateral  wound and unroofing of the blister. Has nurse only twice weekly. Will increase to 3x/week. Patient continues to sleep in recliner.with legs down. D/w son present and patient to sleep in bed 7/22/24 left calf ulcer larger, started using sorbact 1 week ago drainage is copious, s/p excisional debridement long discussion today about sleeping in chair, diet, medication adherence and timing of diuretic encouraged to keep appt. with dr adan for right leg RFA and varithena 8/14/24 Patient is here for follow-up for a LLE wound.  Completed a course of Ciprofloxacin 3 days ago. Reports continuous copious drainage. Doesn't elevate her leg- sleeps in a recliner. Physical Exam: Wound has a moderate amount of yellow slough. Appears to be larger in size. S/P excisional debridement. Wound washed with soap and water. [facility] imaging shows no infection. Skin substitute- Apligraf ordered.

## 2024-08-16 NOTE — PHYSICAL EXAM
[Normal Rate and Rhythm] : normal rate and rhythm [1+] : left 1+ [Ankle Swelling (On Exam)] : present [Ankle Swelling Bilaterally] : bilaterally  [Ankle Swelling On The Left] : moderate [Varicose Veins Of Lower Extremities] : present [] : present [Skin Ulcer] : ulcer [Alert] : alert [Oriented to Person] : oriented to person [Oriented to Place] : oriented to place [Oriented to Time] : oriented to time [Calm] : calm [Please See PDF for [employer]] : Please See PDF for [employer]. [de-identified] : seated at table at home, good hygiene [de-identified] : camila [de-identified] : non labored [de-identified] : no gross deformities [de-identified] : LLE wound

## 2024-08-21 ENCOUNTER — APPOINTMENT (OUTPATIENT)
Dept: WOUND CARE | Facility: HOSPITAL | Age: 79
End: 2024-08-21
Payer: MEDICARE

## 2024-08-21 VITALS — TEMPERATURE: 98.3 F | DIASTOLIC BLOOD PRESSURE: 80 MMHG | SYSTOLIC BLOOD PRESSURE: 137 MMHG | HEART RATE: 81 BPM

## 2024-08-21 DIAGNOSIS — I89.0 LYMPHEDEMA, NOT ELSEWHERE CLASSIFIED: ICD-10-CM

## 2024-08-21 DIAGNOSIS — L98.499 NON-PRESSURE CHRONIC ULCER OF SKIN OF OTHER SITES WITH UNSPECIFIED SEVERITY: ICD-10-CM

## 2024-08-21 DIAGNOSIS — R60.0 LOCALIZED EDEMA: ICD-10-CM

## 2024-08-21 DIAGNOSIS — S81.802A UNSPECIFIED OPEN WOUND, LEFT LOWER LEG, INITIAL ENCOUNTER: ICD-10-CM

## 2024-08-21 PROCEDURE — 11042 DBRDMT SUBQ TIS 1ST 20SQCM/<: CPT

## 2024-08-21 NOTE — ASSESSMENT
[FreeTextEntry1] : 75 yr old woman with left leg venous ulcer , afib, HTN lymphedema  follow up after ablation done 1/26/21. for lt leg  data complex - mod lab,xr reviewed risk- surgery mod on ac, tolerated debridment well, improved pain 9/22/21 right leg was edematous last week so unna was applied, no wounds needs new compression garment left leg has been using ioplex s/p excisional debridement re-measured for compression garment 10/20/21 Wound being treated with ioplex, s/p excisional debridement slight periwound maceration noted 11/10/21 Left leg venous ulcer, wound decrease in size Patient has been using ioplex s/p excisional debridement periwound maceration /The patient was positioned to allow for optimization of imaging. The fluorescence imaging device was placed 8-12 cm from the patient and the clinical darkness required for imaging was obtained by a dark drape. The wound measured () on the (). The Coridea i:X fluorescence imaging device was used to report presence and location of cyan fluorescence, indicative of bacteria at loads greater than 104 CFU/g in real-time. Images reported to have cyan fluorescence. The wound was mechanically cleansed with Dakins 0.125% and underwent excisional debridement. Fluorescence images acquired after cleansing demonstrated reduction in bacteria.  1/14/22 wound clean and pink, size has hit a plateau according to visiting nurse measurements has been using ioplex 4/6/22 Apligraf number 2 applied today to patient's left leg. Wound bed debrided of bioburden and prepped for product application. 1 of pieces and original size of product) obtained. Total product usage was 7.7 sq. cm), Total waste 36.3sq. cm) due to wound size. Product secured with (steri strips and bolster dressing). Patient to f/u in 1 week.  The patient was positioned to allow for optimization of imaging. The fluorescence imaging device was placed 8-12 cm from the patient and the clinical darkness required for imaging was obtained by a dark drape. The wound measured ----- cm2 on the /left ---leg. The videoNEXTt i:X fluorescence imaging device was used to report presence and location of red or cyan fluorescence, indicative of bacteria at loads greater than 104 CFU/g in real-time. s/p excisional debridement 5/16/22 wound clean, using isabel, distal 1/3 of wound is starting to pull into center of wound 5/23/22 apligraf number 3 applied today to patient's left leg. Wound bed debrided of bioburden and prepped for product application. 1 of pieces and original size of product) obtained. Total product usage was 12 sq. cm), Total waste 32 sq. cm) due to wound size. Product secured with (steri strips and bolster dressing). Patient to f/u in 1 week. The patient was positioned to allow for optimization of imaging. The fluorescence imaging device was placed 8-12 cm from the patient and the clinical darkness required for imaging was obtained by a dark drape. The wound measured TA on the /left ---leg). The Coridea i:X fluorescence imaging device was used to report presence and location of red fluorescence, indicative of bacteria at loads greater than 104 CFU/g in real-time. Images reported to have ---no/red/cyan----fluorescence. The wound was ----mechanically cleansed with vashe and/or underwent excisional debridement. Fluorescence images acquired after cleansing demonstrated reduction in bacteria. 8/1/22 USING PUMP WEARING COMPRESSION RIGHT LEG apligraf number () applied today to patient's (anatomical site). Wound bed debrided of bioburden and prepped for product application. (# of pieces and original size of product) obtained. Total product usage was (x sq. cm), Total waste (x sq. cm) due to wound size. Product secured with (steri strips and bolster dressing). Patient to f/u in 1 week.  11/16/22 puraply number 1 applied today to patient's left leg. Wound bed debrided of bioburden and prepped for product application. 1 of pieces and original size of product) obtained. Total product usage was 9 sq. cm), Total waste 6 due to wound size. Product secured with (steri strips and bolster dressing). Patient to f/u in 1 week. s/p excisional debridement  12/9/22 right leg, s/p evacuation hematoma and underlying venous insufficiency, has puraply application 2 weeks ago, at 6 0'clock island of skin and also in center of wound noted   12/28/2022 puraply number 2 applied today to patient's left leg. Wound bed debrided of bioburden and prepped for product application. 1 of pieces and original size of product) obtained. Total product usage was 9 sq. cm), Total waste 6 due to wound size. Product secured with (steri strips and bolster dressing). Patient to f/u in 1 week. s/p excisional debridement No clinical sign of infection The patient was positioned as follows. The fluorescence imaging device was positioned 8-12 cm from the patient and the clinical darkness required for imaging was obtained. The wound measured by Tissue [employer] The Coridea i:X fluorescence imaging device was used to report presence and location of red or cyan fluorescence, indicative of bacteria at loads greater than 104 CFU/g in real-time. Images reported to be negative. Due to presence of infection causing bacteria the wound was cleansed.e. 2/15/23 puraply number 1 applied today to patient's left leg. Wound bed debrided of bioburden and prepped for product application. 1 of pieces and original size of product obtained. Total product usage was 7.7 sq. cm), Total waste 2.3 sq. cm) due to wound size. Product secured with (steri strips and bolster dressing). Patient to f/u in 1 week.  7/5/23 Wound Assessment and Plan: The patient presents with a wound to the LLE. Patient is s/p vascular procedures. Patient dressing wounds with Ioplex.Swelling noted to bilateral extremities. On exam: LLE Two wounds present Wound beds are pink Periwound maceration s/p mechanical debridement RLE Leg swelling no open wounds blanchable erythema  The patient was positioned to allow for optimization of imaging. The fluorescence imaging device was placed 8-12 cm from the patient and the clinical darkness required for imaging was obtained by a dark drape. The wound measured by TA. The Coridea i:X fluorescence imaging device was used to report presence and location of cyan/red fluorescence, indicative of bacteria at loads greater than 104 CFU/g in real-time. Images reported to have cyan/red fluorescence. The wound was mechanically cleansed with Vashe and underwent mechanical debridement. Fluorescence images acquired after cleansing demonstrated reduction in bacteria. No clinical sign of infection  Wash wound with Dove skin sensitive soap and clean water LLE Applied Ioplex/Xtrasorb/Multilayer compression dressings RLE multilayer compression dressing to reduce swelling Change dressing 3 times per week. Leg elevation as tolerated Encouraged ambulation or exercise. Optimization of nutrition. Offloading to the wound site. Home care orders in place Follow up appointment scheduled for 2-3 weeks 9/18/23 left calf 2 wounds, drainage less The patient was positioned to allow for optimization of imaging. The fluorescence imaging device was placed 8-12 cm from the patient and the clinical darkness required for imaging was obtained by a dark drape. The wound measured ----- cm2 on the /left ---leg/foot). The videoNEXTt i:X fluorescence imaging device was used to report presence and location of red and cyan fluorescence, indicative of bacteria at loads greater than 104 CFU/g in real-time. Images reported to have ---no/red & cyan----fluorescence. The wound was ----mechanically cleansed with vashe and/or underwent excisional debridement. Fluorescence images acquired after cleansing demonstrated reduction in bacteria. s/p excisional debridement has been using ioplex not using pump consistently s/p Left RFA  10/9/23 Patient presents for follow up of LLE wounds accompanied by her family memeber. On exam: Wound with red wound bed Periwound slightly macerated s/p mechanical debridement Today applied Zinc to the periwound Aquacel/Superabsorber to the wound bed K2 multilayer dressing HHA to moistened Ioplex before removing to avoid bleeding from the wound bed.  11/22/23 left leg wound superficial, edges with crusting, s/p excisional debridement has been using ioplex, vashe  right leg slightly warm, edematous - will treat, denies fever, pain The patient was positioned to allow for optimization of imaging. The fluorescence imaging device was placed 8-12 cm from the patient and the clinical darkness required for imaging was obtained by a dark drape. The wound measured   ----- cm2 on the /left ---leg. The videoNEXTt i:X fluorescence imaging device was used to report presence and location of cyan fluorescence, indicative of bacteria at loads greater than 104 CFU/g in real-time. Images reported to have ---no cyan----fluorescence. The wound was ----mechanically cleansed with Dakins 0.125%/Iodine/0.9% saline) and/or underwent excisional debridement.  Fluorescence images acquired after cleansing demonstrated reduction in bacteria.   12/13/23 The patient is seen for follow-up for  LLE  wounds. States she is wearing compression garment. The RLE is swollen- doesn't wear stocking on that side due to inability to put the garment on.  1/10/24    Patient presents with her son for follow up of LLE wound. Patient wearing compression on the RLE and wrapped on the LLE. Patient had US testing performed today on b/l lower extremities. Results of testing discussed with patient and son.  On exam: Wounds have red granulation tissue, layer of yellow slough, wound edges macerated, periwound skin intact. s/p excisional debridement The patient was positioned to allow for optimization of imaging. The fluorescence imaging device was placed 8-12 cm from the patient and the clinical darkness required for imaging was obtained by a dark drape. The wound measured TA. The Coridea i:X fluorescence imaging device was used to report presence and location of cyan fluorescence, indicative of bacteria at loads greater than 104 CFU/g in real-time. Images reported to have cyan fluorescence. The wound was mechanically cleansed with Vashe and underwent excisional debridement. Fluorescence images acquired after cleansing demonstrated reduction in bacteria.  Affinity #1 applied today to patients LLE Wounds bed debrided of bioburden and prepped for product application. (1 pieces cut in two and 7 square cm) obtained. Total product usage was (2.96 sq. cm), Total waste (4.04 sq cm) due to wound size. Product secured with (sterile strips and bolster dressing). Maintain wound veil in place until next visit. Keep area dry. Patient has homecare. Has been dressing wound with Ioplex/Aquacel/multilayer dressing. Has a Lymphedema pump , not using daily. Patient to f/u in 2 weeks. 1/24/24 left leg - skin subsitute applied last week, still present on wound bed 1/31/24 s/p excisional debridement of slough, necrotic tissue, periwound hygiene wounds improved with Affinity. Recommend continuation of compression stocking to the RLE Plan for re application of Affinity.  Wound has shown improvement through increased granulation and decreased size)  Wound is free from infection, drainage and necrotic tissue.  (Patient has adequate blood supply as demonstrated by palpable pulses and an GALA of 1  Wound has been treated with standards of care for over 4 weeks including (compression, offloading, debridements.   02/14/2 s/p excisional debridement of slough, necrotic tissue, periwound hygiene wounds improved with Affinity.  The patient was positioned to allow for optimization of imaging. The fluorescence imaging device was placed 8-12 cm from the patient and the clinical darkness required for imaging was obtained by a dark drape.  The Coridea i:X fluorescence imaging device was used to report presence and location of red or cyan fluorescence, indicative of bacteria at loads greater than 104 CFU/g in real-time. Images reported to have ---no/red/cyan----fluorescence. The wound was ----mechanically cleansed with Dakins 0.125%/Iodine/0.9% saline) and/or underwent excisional debridement.  Fluorescence images acquired after cleansing demonstrated reduction in bacteria. No sign of infection. Patient does not smoke. Affinity #2 applied today to patients LLE Wounds bed debrided of bioburden and prepped for product application. (1 pieces cut in two and 7 square cm) obtained. Total product usage was (7 sq. cm), Total waste (0 sq cm) due to wound size. Product secured with (sterile strips and bolster dressing). Maintain wound veil in place until next visit. Keep area dry. Recommend continuation of compression stocking to the RLE   02/28/2024 The patient is seen for follow-up for  LLE  wounds. States she is wearing compression garment. affinity was applied on the last visit On exam: Wounds have red granulation tissue, layer of yellow slough, wound edges macerated, periwound skin intact. s/p excisional debridement Plan for re application of (affinity). Wound has shown improvement through (increased granulation and/or decreased size). Wound is free from infection, drainage and necrotic tissue. (Patient has adequate blood supply as demonstrated by palpable pulses and an GALA of (1.1) from (date). Patient is on a comprehensive diabetic management program with a  Wound has been treated with standards of care for (4) weeks including (compression, offloading, debridements). (skin substitute) number () applied today to patients (anatomical site).   Wound bed debrided of bioburden and prepped for product application.  (# of pieces and original size of product) obtained.  Total product usage was (x sq. cm), Total waste (x sq. cm) due to wound size.  Product secured with (steri strips and bolster dressing). Patient to f/u in 1 week. 3/20/24 Affinity number 1 applied today to patients left lateral calf.   Wound bed debrided of bioburden and prepped for product application.  (# of pieces and original size of product) obtained.  Total product usage was 2 sq. cm), Total waste 5 sq. cm) due to wound size.  Product secured with (steri strips and bolster dressing). Patient to f/u in 1 week. s/p excisional debridement right leg mildly cellulitic - slight warm to touch, no pain 4/10/24 left lateral calf wound had affinity placed 3 weeks ago, wound smaller  mechanical debridement  5/1/24 Patient is here for follow-up for a LLE wound. Physical Exam: Wound is clean, smaller, no need for skin substitute placement. S/P mechanical debridement. Has nurse twice a week.  5/29/24 Pt here for f/u of LLE wound. Accompanied by son. Has home nursing 2x/wk.  No fevers, no new complaints. On exam: BLE edema. Left lateral lower leg wound with mild yellow slough, moderate drainage. Periwound skin is macerated. Excisional debridement performed No evidence of infection  The patient was positioned to allow the best view of the imaging device . The fluorescence imaging device was positioned 8-12 cm from the patient and the clinical darkness required for imaging was obtained. The wound measured by Tissue [employer] The Coridea i:X fluorescence imaging device was used to report presence and location of red or cyan fluorescence, indicative of bacteria at loads greater than 104 CFU/g in real-time. Images reported to be positive. Mechanical and excisional debridement performed, resulting in decrease in the fluorescence image.  6/12/24 Patient here for follow up of LLE wound accompanied by her son. On exam: BLE edema Left lateral wound: Wound bed is red  Moderate serous drainage Periwound dry and scaly  Blanchable erythema The patient was positioned to allow the best view of the imaging device . The fluorescence imaging device was positioned 8-12 cm from the patient and the clinical darkness required for imaging was obtained. The wound measured by Tissue [employer]. The videoNEXTt i:X fluorescence imaging device was used to report presence and location of red or cyan fluorescence, indicative of bacteria at loads greater than 104 CFU/g in real-time. Images reported to be positive. Mechanical and excisional debridement performed, resulting in decrease in the fluorescence image. no s/s of infection s/p mechanical debridement Revyve gel/Drawtex/Fior Encouraged patient to get into bed at night Son will help arrange for patient to get into bed d/w patient regarding her poor sleep hygiene  7/1/2024 Patient present with a new blister to the dorsal aspect of the Left foot. Left lateral wound: Wound bed is red with scant yellow slough Moderate serous drainage Periwound macerated.  Blanchable erythema There is a large blister over the dorsum of the left foot with significant swelling. Molecu Light imaging revealed no evidence of bacteria. S/P excisional debridement of the LLE lateral  wound and unroofing of the blister. Has nurse only twice weekly. Will increase to 3x/week. Patient continues to sleep in recliner.with legs down. D/w son present and patient to sleep in bed 7/22/24 left calf ulcer larger, started using sorbact 1 week ago drainage is copious, s/p excisional debridement long discussion today about sleeping in chair, diet, medication adherence and timing of diuretic encouraged to keep appt. with dr adan for right leg RFA and varithena 8/14/24 Patient is here for follow-up for a LLE wound.  Completed a course of Ciprofloxacin 3 days ago. Reports continuous copious drainage. Doesn't elevate her leg- sleeps in a recliner. Physical Exam: Wound has a moderate amount of yellow slough. Appears to be larger in size. S/P excisional debridement. Wound washed with soap and water. Moleculight imaging shows no infection. Skin substitute- Apligraf ordered.  8/21/24 Patient is here for follow-up for an LLE wound.  Wound draining large amount of exudate. copious drainage. On exam: Wound has a moderate amount of yellow slough.  Maceration at wound edges Large serous exudate, no odor or purulence. Apligarf placement postponed due to excess drainage. S/P excisional debridement. Wound washed with soap and water. Zinc to the periwound Drawtex/Fior/K2

## 2024-08-21 NOTE — PHYSICAL EXAM
[Normal Rate and Rhythm] : normal rate and rhythm [1+] : left 1+ [Ankle Swelling (On Exam)] : present [Ankle Swelling Bilaterally] : bilaterally  [Ankle Swelling On The Left] : moderate [Varicose Veins Of Lower Extremities] : present [] : present [Skin Ulcer] : ulcer [Alert] : alert [Oriented to Person] : oriented to person [Oriented to Place] : oriented to place [Oriented to Time] : oriented to time [Calm] : calm [Please See PDF for [employer]] : Please See PDF for [employer]. [de-identified] : seated at table at home, good hygiene [de-identified] : camila [de-identified] : non labored [de-identified] : no gross deformities [de-identified] : LLE wound

## 2024-08-21 NOTE — PLAN
[FreeTextEntry1] : 9/18/23 Plan - shower wound, scrub misael wound skin well as this area positive on moleculight use pump daily, 45 minutes can c/w ioplex, will ask nurse to get acticoat, needs to wipe down leg and wound with vashe before applying products xtrasorb/dynaflex follow up 2-3 weeks  10/6/23 Plan: Moistened Ioplex with water to remove if it adheres to the wound bed. Plan - shower wound, scrub misael wound skin well  use pump daily, 45 minutes WAsh wound with Vashe, apply Zinc oxide to the misael wound Ioplex, xtrasorb/dynaflex follow up 2-3 weeks Nursing orders given. 11/22/23 Plan - orders for nurse c/w ioplex/super absorber/dynaflex follow up 2-3 weeks 12/13/23 Recommended  using compression garment on the RLE Continue compression garment on the LLE. Repeat vascular studies. Ioplex to LLE wounds. Compression wrap to LLE. The patient was positioned as follows .  The fluorescence imaging device was positioned 8-12 cm from the patient and the clinical darkness required for imaging was obtained.  The wound measured by Tissue [employer]..  The So[employer]ets i:X fluorescence imaging device was used to report presence and location of red or cyan fluorescence, indicative of bacteria at loads greater than 104 CFU/g in real-time.  Images reported to be positive mechanical washed.  Due to presence of infection causing bacteria the wound was cleansed.  Fluorescence images acquired after cleansing reported no reduction in bacteria..  I then turned my attention to debridement, resulting in decrease in the fluorescence image. measured for compression grment vascular test to be scheduled  1/10/24 Plan: Maintain wound veil in place. Do not remove wound veil Keep area dry Nursing orders given Patient measured for Circaids on the next visit. RTO in two weeks   1/14/24 Plan - veil to be left in place, change above follow up in 2 weeks nursing orders given  02/28/2024 isabel, xtrasorb, multilayer dressing follow up in 2-3 weeks nursing orders given Affinity will be ordered 3/20/24 Plan - skin sub. applied/veil over/dynaflex follow up 2 weeks orders for nurse keflex to pharmacy for right leg 4/10/24 Plan - order skin sub. orders for nurse  resume showering, isabel/aquacel/dynaflex follow up 3 weeks  5/1/24 S/P mechanical debridement. Moisturize daily. Isabel/ Aqua Cell/Dynaplex. Nurses' orders given. Follow-up 3-4 weeks.  5/29/24 Plan: Today applied Cavilon advanced to periwound skin on LLE. BLE: Cleanse with vashe, do not rinse. Moisturize intact skin LLE: Drawtex to wound bed. Multilayer compression RLE: Today multiplayer compression wrap - remove at next nursing visit. Then compression garment daily Nursing orders provided Recommend follow-up with Dr. Chavez for evaluation of RLE veins Follow up in 3-4weeks.  6/12/24 Plan: Cavilon to the periwound Revyve gel/Drawtex/Fior Patient has an appt today with Dr. Chavez. Will be wrapped with K2 after the visit today. Follow up in two weeks.  7/1/24 Plan: Increase nurse's visit to 3x/week.  Wash wound with soap and water. S/P excisional debridement of the LLE lateral wound. Blister over the dorsum of the left foot was unroofed.  Cavilon to the misael-wound. K2 wrap. Continue with compression stockings. Follow-up visit in 3-4 weeks. 7/22/24 Plan - orders for nurse augie supplements given c/w wash/vashe clean, sorbact/super absorber/dynaflex lymphedema pump daily  needs weekend help diet, med. adherence, sleeping in bed follow up 3 weeks. 8/14/24 Plan: S/P excisional debridement. Wash with Vashe.  Zinc to the misael- wound. Drawtex. Dynaflex wrap applied. Strongly encouraged to elevate the leg. Follow-up in one week.  8/21/24 Plan: Wash wound with soap and water Zinc to the periwound Drawtex/Fior/K2 Nursing orders given Elevate LLE as tolerated. RTO in two weeks for Apligraf

## 2024-08-22 ENCOUNTER — APPOINTMENT (OUTPATIENT)
Dept: VASCULAR SURGERY | Facility: CLINIC | Age: 79
End: 2024-08-22

## 2024-08-22 NOTE — REASON FOR VISIT
[Procedure: _________] : a [unfilled] procedure visit [Spouse] : spouse [FreeTextEntry1] : left leg ulcer How Severe Is Your Acne?: mild Is This A New Presentation, Or A Follow-Up?: Follow Up Acne Additional Comments (Use Complete Sentences): Pt had called back in may for a refill of minocycline. She is taking PRN.

## 2024-09-11 ENCOUNTER — APPOINTMENT (OUTPATIENT)
Dept: WOUND CARE | Facility: HOSPITAL | Age: 79
End: 2024-09-11

## 2024-09-11 ENCOUNTER — APPOINTMENT (OUTPATIENT)
Dept: VASCULAR SURGERY | Facility: CLINIC | Age: 79
End: 2024-09-11

## 2024-09-11 DIAGNOSIS — L03.116 CELLULITIS OF LEFT LOWER LIMB: ICD-10-CM

## 2024-09-11 DIAGNOSIS — L03.115 CELLULITIS OF RIGHT LOWER LIMB: ICD-10-CM

## 2024-09-11 DIAGNOSIS — I89.0 LYMPHEDEMA, NOT ELSEWHERE CLASSIFIED: ICD-10-CM

## 2024-09-11 DIAGNOSIS — S81.802A UNSPECIFIED OPEN WOUND, LEFT LOWER LEG, INITIAL ENCOUNTER: ICD-10-CM

## 2024-09-11 PROCEDURE — 11045 DBRDMT SUBQ TISS EACH ADDL: CPT

## 2024-09-11 PROCEDURE — 11042 DBRDMT SUBQ TIS 1ST 20SQCM/<: CPT

## 2024-09-11 PROCEDURE — 87070 CULTURE OTHR SPECIMN AEROBIC: CPT

## 2024-09-11 NOTE — PLAN
[FreeTextEntry1] : 9/18/23 Plan - shower wound, scrub misael wound skin well as this area positive on moleculight use pump daily, 45 minutes can c/w ioplex, will ask nurse to get acticoat, needs to wipe down leg and wound with vashe before applying products xtrasorb/dynaflex follow up 2-3 weeks  10/6/23 Plan: Moistened Ioplex with water to remove if it adheres to the wound bed. Plan - shower wound, scrub misael wound skin well  use pump daily, 45 minutes WAsh wound with Vashe, apply Zinc oxide to the misael wound Ioplex, xtrasorb/dynaflex follow up 2-3 weeks Nursing orders given. 11/22/23 Plan - orders for nurse c/w ioplex/super absorber/dynaflex follow up 2-3 weeks 12/13/23 Recommended  using compression garment on the RLE Continue compression garment on the LLE. Repeat vascular studies. Ioplex to LLE wounds. Compression wrap to LLE. The patient was positioned as follows .  The fluorescence imaging device was positioned 8-12 cm from the patient and the clinical darkness required for imaging was obtained.  The wound measured by Tissue [employer]..  The [employer] i:X fluorescence imaging device was used to report presence and location of red or cyan fluorescence, indicative of bacteria at loads greater than 104 CFU/g in real-time.  Images reported to be positive mechanical washed.  Due to presence of infection causing bacteria the wound was cleansed.  Fluorescence images acquired after cleansing reported no reduction in bacteria..  I then turned my attention to debridement, resulting in decrease in the fluorescence image. measured for compression grment vascular test to be scheduled  1/10/24 Plan: Maintain wound veil in place. Do not remove wound veil Keep area dry Nursing orders given Patient measured for Circaids on the next visit. RTO in two weeks   1/14/24 Plan - veil to be left in place, change above follow up in 2 weeks nursing orders given  02/28/2024 isabel, xtrasorb, multilayer dressing follow up in 2-3 weeks nursing orders given Affinity will be ordered 3/20/24 Plan - skin sub. applied/veil over/dynaflex follow up 2 weeks orders for nurse keflex to pharmacy for right leg 4/10/24 Plan - order skin sub. orders for nurse  resume showering, isabel/aquacel/dynaflex follow up 3 weeks  5/1/24 S/P mechanical debridement. Moisturize daily. Isabel/ Aqua Cell/Dynaplex. Nurses' orders given. Follow-up 3-4 weeks.  5/29/24 Plan: Today applied Cavilon advanced to periwound skin on LLE. BLE: Cleanse with vashe, do not rinse. Moisturize intact skin LLE: Drawtex to wound bed. Multilayer compression RLE: Today multiplayer compression wrap - remove at next nursing visit. Then compression garment daily Nursing orders provided Recommend follow-up with Dr. Chavez for evaluation of RLE veins Follow up in 3-4weeks.  6/12/24 Plan: Cavilon to the periwound Revyve gel/Drawtex/Fior Patient has an appt today with Dr. Chavez. Will be wrapped with K2 after the visit today. Follow up in two weeks.  7/1/24 Plan: Increase nurse's visit to 3x/week.  Wash wound with soap and water. S/P excisional debridement of the LLE lateral wound. Blister over the dorsum of the left foot was unroofed.  Cavilon to the misael-wound. K2 wrap. Continue with compression stockings. Follow-up visit in 3-4 weeks. 7/22/24 Plan - orders for nurse augie supplements given c/w wash/vashe clean, sorbact/super absorber/dynaflex lymphedema pump daily  needs weekend help diet, med. adherence, sleeping in bed follow up 3 weeks. 8/14/24 Plan: S/P excisional debridement. Wash with Vashe.  Zinc to the misael- wound. Drawtex. Dynaflex wrap applied. Strongly encouraged to elevate the leg. Follow-up in one week.  8/21/24 Plan: Wash wound with soap and water Zinc to the periwound Drawtex/Fior/K2 Nursing orders given Elevate LLE as tolerated. RTO in two weeks for Apligraf  9/11/24 Plan: Wash wound with soap and water Zinc to the periwound Ioplex/Xtrasorb/Fior/K2 Nursing orders given Elevate LLE as tolerated RTO in 4 weeks

## 2024-09-11 NOTE — ASSESSMENT
[FreeTextEntry1] : 75 yr old woman with left leg venous ulcer , afib, HTN lymphedema  follow up after ablation done 1/26/21. for lt leg  data complex - mod lab,xr reviewed risk- surgery mod on ac, tolerated debridment well, improved pain 9/22/21 right leg was edematous last week so unna was applied, no wounds needs new compression garment left leg has been using ioplex s/p excisional debridement re-measured for compression garment 10/20/21 Wound being treated with ioplex, s/p excisional debridement slight periwound maceration noted 11/10/21 Left leg venous ulcer, wound decrease in size Patient has been using ioplex s/p excisional debridement periwound maceration /The patient was positioned to allow for optimization of imaging. The fluorescence imaging device was placed 8-12 cm from the patient and the clinical darkness required for imaging was obtained by a dark drape. The wound measured () on the (). The [employer] i:X fluorescence imaging device was used to report presence and location of cyan fluorescence, indicative of bacteria at loads greater than 104 CFU/g in real-time. Images reported to have cyan fluorescence. The wound was mechanically cleansed with Dakins 0.125% and underwent excisional debridement. Fluorescence images acquired after cleansing demonstrated reduction in bacteria.  1/14/22 wound clean and pink, size has hit a plateau according to visiting nurse measurements has been using ioplex 4/6/22 Apligraf number 2 applied today to patient's left leg. Wound bed debrided of bioburden and prepped for product application. 1 of pieces and original size of product) obtained. Total product usage was 7.7 sq. cm), Total waste 36.3sq. cm) due to wound size. Product secured with (steri strips and bolster dressing). Patient to f/u in 1 week.  The patient was positioned to allow for optimization of imaging. The fluorescence imaging device was placed 8-12 cm from the patient and the clinical darkness required for imaging was obtained by a dark drape. The wound measured ----- cm2 on the /left ---leg. The [employer] i:X fluorescence imaging device was used to report presence and location of red or cyan fluorescence, indicative of bacteria at loads greater than 104 CFU/g in real-time. s/p excisional debridement 5/16/22 wound clean, using isabel, distal 1/3 of wound is starting to pull into center of wound 5/23/22 apligraf number 3 applied today to patient's left leg. Wound bed debrided of bioburden and prepped for product application. 1 of pieces and original size of product) obtained. Total product usage was 12 sq. cm), Total waste 32 sq. cm) due to wound size. Product secured with (steri strips and bolster dressing). Patient to f/u in 1 week. The patient was positioned to allow for optimization of imaging. The fluorescence imaging device was placed 8-12 cm from the patient and the clinical darkness required for imaging was obtained by a dark drape. The wound measured TA on the /left ---leg). The [employer] i:X fluorescence imaging device was used to report presence and location of red fluorescence, indicative of bacteria at loads greater than 104 CFU/g in real-time. Images reported to have ---no/red/cyan----fluorescence. The wound was ----mechanically cleansed with vashe and/or underwent excisional debridement. Fluorescence images acquired after cleansing demonstrated reduction in bacteria. 8/1/22 USING PUMP WEARING COMPRESSION RIGHT LEG apligraf number () applied today to patient's (anatomical site). Wound bed debrided of bioburden and prepped for product application. (# of pieces and original size of product) obtained. Total product usage was (x sq. cm), Total waste (x sq. cm) due to wound size. Product secured with (steri strips and bolster dressing). Patient to f/u in 1 week.  11/16/22 puraply number 1 applied today to patient's left leg. Wound bed debrided of bioburden and prepped for product application. 1 of pieces and original size of product) obtained. Total product usage was 9 sq. cm), Total waste 6 due to wound size. Product secured with (steri strips and bolster dressing). Patient to f/u in 1 week. s/p excisional debridement  12/9/22 right leg, s/p evacuation hematoma and underlying venous insufficiency, has puraply application 2 weeks ago, at 6 0'clock island of skin and also in center of wound noted   12/28/2022 puraply number 2 applied today to patient's left leg. Wound bed debrided of bioburden and prepped for product application. 1 of pieces and original size of product) obtained. Total product usage was 9 sq. cm), Total waste 6 due to wound size. Product secured with (steri strips and bolster dressing). Patient to f/u in 1 week. s/p excisional debridement No clinical sign of infection The patient was positioned as follows. The fluorescence imaging device was positioned 8-12 cm from the patient and the clinical darkness required for imaging was obtained. The wound measured by Tissue [employer].. The [employer] i:X fluorescence imaging device was used to report presence and location of red or cyan fluorescence, indicative of bacteria at loads greater than 104 CFU/g in real-time. Images reported to be negative. Due to presence of infection causing bacteria the wound was cleansed.e. 2/15/23 puraply number 1 applied today to patient's left leg. Wound bed debrided of bioburden and prepped for product application. 1 of pieces and original size of product obtained. Total product usage was 7.7 sq. cm), Total waste 2.3 sq. cm) due to wound size. Product secured with (steri strips and bolster dressing). Patient to f/u in 1 week.  7/5/23 Wound Assessment and Plan: The patient presents with a wound to the LLE. Patient is s/p vascular procedures. Patient dressing wounds with Ioplex.Swelling noted to bilateral extremities. On exam: LLE Two wounds present Wound beds are pink Periwound maceration s/p mechanical debridement RLE Leg swelling no open wounds blanchable erythema  The patient was positioned to allow for optimization of imaging. The fluorescence imaging device was placed 8-12 cm from the patient and the clinical darkness required for imaging was obtained by a dark drape. The wound measured by TA. The [employer] i:X fluorescence imaging device was used to report presence and location of cyan/red fluorescence, indicative of bacteria at loads greater than 104 CFU/g in real-time. Images reported to have cyan/red fluorescence. The wound was mechanically cleansed with Vashe and underwent mechanical debridement. Fluorescence images acquired after cleansing demonstrated reduction in bacteria. No clinical sign of infection  Wash wound with Dove skin sensitive soap and clean water LLE Applied Ioplex/Xtrasorb/Multilayer compression dressings RLE multilayer compression dressing to reduce swelling Change dressing 3 times per week. Leg elevation as tolerated Encouraged ambulation or exercise. Optimization of nutrition. Offloading to the wound site. Home care orders in place Follow up appointment scheduled for 2-3 weeks 9/18/23 left calf 2 wounds, drainage less The patient was positioned to allow for optimization of imaging. The fluorescence imaging device was placed 8-12 cm from the patient and the clinical darkness required for imaging was obtained by a dark drape. The wound measured ----- cm2 on the /left ---leg/foot). The [employer] i:X fluorescence imaging device was used to report presence and location of red and cyan fluorescence, indicative of bacteria at loads greater than 104 CFU/g in real-time. Images reported to have ---no/red & cyan----fluorescence. The wound was ----mechanically cleansed with vashe and/or underwent excisional debridement. Fluorescence images acquired after cleansing demonstrated reduction in bacteria. s/p excisional debridement has been using ioplex not using pump consistently s/p Left RFA  10/9/23 Patient presents for follow up of LLE wounds accompanied by her family memeber. On exam: Wound with red wound bed Periwound slightly macerated s/p mechanical debridement Today applied Zinc to the periwound Aquacel/Superabsorber to the wound bed K2 multilayer dressing HHA to moistened Ioplex before removing to avoid bleeding from the wound bed.  11/22/23 left leg wound superficial, edges with crusting, s/p excisional debridement has been using ioplex, vashe  right leg slightly warm, edematous - will treat, denies fever, pain The patient was positioned to allow for optimization of imaging. The fluorescence imaging device was placed 8-12 cm from the patient and the clinical darkness required for imaging was obtained by a dark drape. The wound measured   ----- cm2 on the /left ---leg. The [employer] i:X fluorescence imaging device was used to report presence and location of cyan fluorescence, indicative of bacteria at loads greater than 104 CFU/g in real-time. Images reported to have ---no cyan----fluorescence. The wound was ----mechanically cleansed with Dakins 0.125%/Iodine/0.9% saline) and/or underwent excisional debridement.  Fluorescence images acquired after cleansing demonstrated reduction in bacteria.   12/13/23 The patient is seen for follow-up for  LLE  wounds. States she is wearing compression garment. The RLE is swollen- doesn't wear stocking on that side due to inability to put the garment on.  1/10/24    Patient presents with her son for follow up of LLE wound. Patient wearing compression on the RLE and wrapped on the LLE. Patient had US testing performed today on b/l lower extremities. Results of testing discussed with patient and son.  On exam: Wounds have red granulation tissue, layer of yellow slough, wound edges macerated, periwound skin intact. s/p excisional debridement The patient was positioned to allow for optimization of imaging. The fluorescence imaging device was placed 8-12 cm from the patient and the clinical darkness required for imaging was obtained by a dark drape. The wound measured TA. The [employer] i:X fluorescence imaging device was used to report presence and location of cyan fluorescence, indicative of bacteria at loads greater than 104 CFU/g in real-time. Images reported to have cyan fluorescence. The wound was mechanically cleansed with Vashe and underwent excisional debridement. Fluorescence images acquired after cleansing demonstrated reduction in bacteria.  Affinity #1 applied today to patients LLE Wounds bed debrided of bioburden and prepped for product application. (1 pieces cut in two and 7 square cm) obtained. Total product usage was (2.96 sq. cm), Total waste (4.04 sq cm) due to wound size. Product secured with (sterile strips and bolster dressing). Maintain wound veil in place until next visit. Keep area dry. Patient has homecare. Has been dressing wound with Ioplex/Aquacel/multilayer dressing. Has a Lymphedema pump , not using daily. Patient to f/u in 2 weeks. 1/24/24 left leg - skin subsitute applied last week, still present on wound bed 1/31/24 s/p excisional debridement of slough, necrotic tissue, periwound hygiene wounds improved with Affinity. Recommend continuation of compression stocking to the RLE Plan for re application of Affinity.  Wound has shown improvement through increased granulation and decreased size)  Wound is free from infection, drainage and necrotic tissue.  (Patient has adequate blood supply as demonstrated by palpable pulses and an GALA of 1  Wound has been treated with standards of care for over 4 weeks including (compression, offloading, debridements.   02/14/2 s/p excisional debridement of slough, necrotic tissue, periwound hygiene wounds improved with Affinity.  The patient was positioned to allow for optimization of imaging. The fluorescence imaging device was placed 8-12 cm from the patient and the clinical darkness required for imaging was obtained by a dark drape.  The [employer] i:X fluorescence imaging device was used to report presence and location of red or cyan fluorescence, indicative of bacteria at loads greater than 104 CFU/g in real-time. Images reported to have ---no/red/cyan----fluorescence. The wound was ----mechanically cleansed with Dakins 0.125%/Iodine/0.9% saline) and/or underwent excisional debridement.  Fluorescence images acquired after cleansing demonstrated reduction in bacteria. No sign of infection. Patient does not smoke. Affinity #2 applied today to patients LLE Wounds bed debrided of bioburden and prepped for product application. (1 pieces cut in two and 7 square cm) obtained. Total product usage was (7 sq. cm), Total waste (0 sq cm) due to wound size. Product secured with (sterile strips and bolster dressing). Maintain wound veil in place until next visit. Keep area dry. Recommend continuation of compression stocking to the RLE   02/28/2024 The patient is seen for follow-up for  LLE  wounds. States she is wearing compression garment. affinity was applied on the last visit On exam: Wounds have red granulation tissue, layer of yellow slough, wound edges macerated, periwound skin intact. s/p excisional debridement Plan for re application of (affinity). Wound has shown improvement through (increased granulation and/or decreased size). Wound is free from infection, drainage and necrotic tissue. (Patient has adequate blood supply as demonstrated by palpable pulses and an GALA of (1.1) from (date). Patient is on a comprehensive diabetic management program with a  Wound has been treated with standards of care for (4) weeks including (compression, offloading, debridements). (skin substitute) number () applied today to patients (anatomical site).   Wound bed debrided of bioburden and prepped for product application.  (# of pieces and original size of product) obtained.  Total product usage was (x sq. cm), Total waste (x sq. cm) due to wound size.  Product secured with (steri strips and bolster dressing). Patient to f/u in 1 week. 3/20/24 Affinity number 1 applied today to patients left lateral calf.   Wound bed debrided of bioburden and prepped for product application.  (# of pieces and original size of product) obtained.  Total product usage was 2 sq. cm), Total waste 5 sq. cm) due to wound size.  Product secured with (steri strips and bolster dressing). Patient to f/u in 1 week. s/p excisional debridement right leg mildly cellulitic - slight warm to touch, no pain 4/10/24 left lateral calf wound had affinity placed 3 weeks ago, wound smaller  mechanical debridement  5/1/24 Patient is here for follow-up for a LLE wound. Physical Exam: Wound is clean, smaller, no need for skin substitute placement. S/P mechanical debridement. Has nurse twice a week.  5/29/24 Pt here for f/u of LLE wound. Accompanied by son. Has home nursing 2x/wk.  No fevers, no new complaints. On exam: BLE edema. Left lateral lower leg wound with mild yellow slough, moderate drainage. Periwound skin is macerated. Excisional debridement performed No evidence of infection  The patient was positioned to allow the best view of the imaging device . The fluorescence imaging device was positioned 8-12 cm from the patient and the clinical darkness required for imaging was obtained. The wound measured by Tissue [employer].. The [employer] i:X fluorescence imaging device was used to report presence and location of red or cyan fluorescence, indicative of bacteria at loads greater than 104 CFU/g in real-time. Images reported to be positive. Mechanical and excisional debridement performed, resulting in decrease in the fluorescence image.  6/12/24 Patient here for follow up of LLE wound accompanied by her son. On exam: BLE edema Left lateral wound: Wound bed is red  Moderate serous drainage Periwound dry and scaly  Blanchable erythema The patient was positioned to allow the best view of the imaging device . The fluorescence imaging device was positioned 8-12 cm from the patient and the clinical darkness required for imaging was obtained. The wound measured by Tissue [employer]. The [employer] i:X fluorescence imaging device was used to report presence and location of red or cyan fluorescence, indicative of bacteria at loads greater than 104 CFU/g in real-time. Images reported to be positive. Mechanical and excisional debridement performed, resulting in decrease in the fluorescence image. no s/s of infection s/p mechanical debridement Revyve gel/Drawtex/Fior Encouraged patient to get into bed at night Son will help arrange for patient to get into bed d/w patient regarding her poor sleep hygiene  7/1/2024 Patient present with a new blister to the dorsal aspect of the Left foot. Left lateral wound: Wound bed is red with scant yellow slough Moderate serous drainage Periwound macerated.  Blanchable erythema There is a large blister over the dorsum of the left foot with significant swelling. [employer] imaging revealed no evidence of bacteria. S/P excisional debridement of the LLE lateral  wound and unroofing of the blister. Has nurse only twice weekly. Will increase to 3x/week. Patient continues to sleep in recliner.with legs down. D/w son present and patient to sleep in bed 7/22/24 left calf ulcer larger, started using sorbact 1 week ago drainage is copious, s/p excisional debridement long discussion today about sleeping in chair, diet, medication adherence and timing of diuretic encouraged to keep appt. with dr adan for right leg RFA and varithena 8/14/24 Patient is here for follow-up for a LLE wound.  Completed a course of Ciprofloxacin 3 days ago. Reports continuous copious drainage. Doesn't elevate her leg- sleeps in a recliner. Physical Exam: Wound has a moderate amount of yellow slough. Appears to be larger in size. S/P excisional debridement. Wound washed with soap and water. [facility] imaging shows no infection. Skin substitute- Apligraf ordered.  8/21/24 Patient is here for follow-up for an LLE wound.  Wound draining large amount of exudate. copious drainage. On exam: Wound has a moderate amount of yellow slough.  Maceration at wound edges Large serous exudate, no odor or purulence. Apligarf placement postponed due to excess drainage. S/P excisional debridement. Wound washed with soap and water. Zinc to the periwound Drawtex/Fior/K2   9/11/24 Patient is here for follow-up for an LLE wound.  Wound draining large amount of exudate. copious drainage. On exam: RLE: leg edema present Erythema and warmth present Patient presently on Bactrim with no improvement Ciprofloxacin renewed Patient to stop Bactrim and start Ciprofloxacin LLE wound: Wound has a moderate amount of yellow slough.  Wound culture taken The patient was positioned to allow for optimization of imaging. The fluorescence imaging device was placed 8-12 cm from the patient and the clinical darkness required for imaging was obtained by a dark drape. The wound measured TA. The [employer] i:X fluorescence imaging device was used to report presence and location of cyan/red fluorescence, indicative of bacteria at loads greater than 104 CFU/g in real-time. Images reported to have cyan/red fluorescence. The wound was mechanically cleansed with soap and water and Vashe underwent excisional debridement. Fluorescence images acquired after cleansing demonstrated reduction in bacteria. Maceration at wound edges Cheryl wound erythema S/P excisional debridement. Wound washed with soap and water/ Vashe Zinc to the periwound Today Iodosrb/Xtrasorb/Fior/K2 Patient educated on application of Circaids by RN. new Circaid applied to RLE

## 2024-09-11 NOTE — PHYSICAL EXAM
[Normal Rate and Rhythm] : normal rate and rhythm [1+] : left 1+ [Ankle Swelling (On Exam)] : present [Ankle Swelling Bilaterally] : bilaterally  [Ankle Swelling On The Left] : moderate [Varicose Veins Of Lower Extremities] : present [] : present [Skin Ulcer] : ulcer [Alert] : alert [Oriented to Person] : oriented to person [Oriented to Place] : oriented to place [Oriented to Time] : oriented to time [Calm] : calm [Please See PDF for [employer]] : Please See PDF for [employer]. [de-identified] : seated at table at home, good hygiene [de-identified] : camila [de-identified] : non labored [de-identified] : no gross deformities [de-identified] : LLE wound

## 2024-09-18 DIAGNOSIS — L98.499 NON-PRESSURE CHRONIC ULCER OF SKIN OF OTHER SITES WITH UNSPECIFIED SEVERITY: ICD-10-CM

## 2024-09-20 LAB — BACTERIA SPEC CULT: ABNORMAL

## 2024-10-07 ENCOUNTER — OUTPATIENT (OUTPATIENT)
Dept: OUTPATIENT SERVICES | Facility: HOSPITAL | Age: 79
LOS: 1 days | End: 2024-10-07
Payer: MEDICARE

## 2024-10-07 ENCOUNTER — APPOINTMENT (OUTPATIENT)
Dept: WOUND CARE | Facility: HOSPITAL | Age: 79
End: 2024-10-07
Payer: MEDICARE

## 2024-10-07 VITALS
DIASTOLIC BLOOD PRESSURE: 77 MMHG | OXYGEN SATURATION: 91 % | TEMPERATURE: 97.9 F | RESPIRATION RATE: 20 BRPM | SYSTOLIC BLOOD PRESSURE: 139 MMHG | HEART RATE: 116 BPM

## 2024-10-07 DIAGNOSIS — L97.929 CHRONIC VENOUS HYPERTENSION (IDIOPATHIC) WITH ULCER OF LEFT LOWER EXTREMITY: ICD-10-CM

## 2024-10-07 DIAGNOSIS — I87.312 CHRONIC VENOUS HYPERTENSION (IDIOPATHIC) WITH ULCER OF LEFT LOWER EXTREMITY: ICD-10-CM

## 2024-10-07 PROCEDURE — 11042 DBRDMT SUBQ TIS 1ST 20SQCM/<: CPT

## 2024-10-16 DIAGNOSIS — I87.312 CHRONIC VENOUS HYPERTENSION (IDIOPATHIC) WITH ULCER OF LEFT LOWER EXTREMITY: ICD-10-CM

## 2024-10-23 ENCOUNTER — APPOINTMENT (OUTPATIENT)
Dept: VASCULAR SURGERY | Facility: CLINIC | Age: 79
End: 2024-10-23

## 2024-10-23 ENCOUNTER — APPOINTMENT (OUTPATIENT)
Dept: VASCULAR SURGERY | Facility: CLINIC | Age: 79
End: 2024-10-23
Payer: MEDICARE

## 2024-10-23 VITALS
HEART RATE: 76 BPM | BODY MASS INDEX: 37.21 KG/M2 | HEIGHT: 63 IN | TEMPERATURE: 97.9 F | WEIGHT: 210 LBS | SYSTOLIC BLOOD PRESSURE: 129 MMHG | DIASTOLIC BLOOD PRESSURE: 69 MMHG

## 2024-10-23 PROCEDURE — 99214 OFFICE O/P EST MOD 30 MIN: CPT

## 2024-10-23 PROCEDURE — 93971 EXTREMITY STUDY: CPT

## 2024-10-28 ENCOUNTER — OUTPATIENT (OUTPATIENT)
Dept: OUTPATIENT SERVICES | Facility: HOSPITAL | Age: 79
LOS: 1 days | End: 2024-10-28
Payer: MEDICARE

## 2024-10-28 ENCOUNTER — APPOINTMENT (OUTPATIENT)
Dept: WOUND CARE | Facility: HOSPITAL | Age: 79
End: 2024-10-28
Payer: MEDICARE

## 2024-10-28 VITALS
WEIGHT: 210 LBS | HEART RATE: 79 BPM | TEMPERATURE: 98.4 F | BODY MASS INDEX: 37.21 KG/M2 | HEIGHT: 63 IN | OXYGEN SATURATION: 96 % | SYSTOLIC BLOOD PRESSURE: 106 MMHG | RESPIRATION RATE: 18 BRPM | DIASTOLIC BLOOD PRESSURE: 72 MMHG

## 2024-10-28 DIAGNOSIS — L97.929 CHRONIC VENOUS HYPERTENSION (IDIOPATHIC) WITH ULCER OF LEFT LOWER EXTREMITY: ICD-10-CM

## 2024-10-28 DIAGNOSIS — I89.0 LYMPHEDEMA, NOT ELSEWHERE CLASSIFIED: ICD-10-CM

## 2024-10-28 DIAGNOSIS — I87.312 CHRONIC VENOUS HYPERTENSION (IDIOPATHIC) WITH ULCER OF LEFT LOWER EXTREMITY: ICD-10-CM

## 2024-10-28 DIAGNOSIS — S80.12XD CONTUSION OF LEFT LOWER LEG, SUBSEQUENT ENCOUNTER: ICD-10-CM

## 2024-10-28 DIAGNOSIS — S81.802A UNSPECIFIED OPEN WOUND, LEFT LOWER LEG, INITIAL ENCOUNTER: ICD-10-CM

## 2024-10-28 PROCEDURE — 29581 APPL MULTLAYER CMPRN SYS LEG: CPT | Mod: LT

## 2024-11-04 RX ORDER — LIDOCAINE HYDROCHLORIDE 10 MG/ML
1 INJECTION, SOLUTION INFILTRATION; PERINEURAL
Qty: 50 | Refills: 0 | Status: ACTIVE | COMMUNITY
Start: 2024-11-04 | End: 1900-01-01

## 2024-11-04 RX ORDER — ALPRAZOLAM 0.25 MG/1
0.25 TABLET ORAL
Qty: 1 | Refills: 0 | Status: ACTIVE | COMMUNITY
Start: 2024-11-04 | End: 1900-01-01

## 2024-11-05 ENCOUNTER — RX RENEWAL (OUTPATIENT)
Age: 79
End: 2024-11-05

## 2024-11-06 DIAGNOSIS — L97.929 NON-PRESSURE CHRONIC ULCER OF UNSPECIFIED PART OF LEFT LOWER LEG WITH UNSPECIFIED SEVERITY: ICD-10-CM

## 2024-11-07 ENCOUNTER — APPOINTMENT (OUTPATIENT)
Dept: VASCULAR SURGERY | Facility: CLINIC | Age: 79
End: 2024-11-07
Payer: MEDICARE

## 2024-11-07 PROCEDURE — 36475 ENDOVENOUS RF 1ST VEIN: CPT | Mod: RT

## 2024-11-11 ENCOUNTER — APPOINTMENT (OUTPATIENT)
Dept: WOUND CARE | Facility: HOSPITAL | Age: 79
End: 2024-11-11
Payer: MEDICARE

## 2024-11-11 ENCOUNTER — OUTPATIENT (OUTPATIENT)
Dept: OUTPATIENT SERVICES | Facility: HOSPITAL | Age: 79
LOS: 1 days | End: 2024-11-11
Payer: MEDICARE

## 2024-11-11 DIAGNOSIS — S81.802A UNSPECIFIED OPEN WOUND, LEFT LOWER LEG, INITIAL ENCOUNTER: ICD-10-CM

## 2024-11-11 DIAGNOSIS — I87.312 CHRONIC VENOUS HYPERTENSION (IDIOPATHIC) WITH ULCER OF LEFT LOWER EXTREMITY: ICD-10-CM

## 2024-11-11 DIAGNOSIS — L97.929 CHRONIC VENOUS HYPERTENSION (IDIOPATHIC) WITH ULCER OF LEFT LOWER EXTREMITY: ICD-10-CM

## 2024-11-11 PROCEDURE — 11042 DBRDMT SUBQ TIS 1ST 20SQCM/<: CPT

## 2024-11-12 RX ORDER — ALPRAZOLAM 0.25 MG/1
0.25 TABLET ORAL
Qty: 1 | Refills: 0 | Status: ACTIVE | COMMUNITY
Start: 2024-11-12 | End: 1900-01-01

## 2024-11-13 ENCOUNTER — APPOINTMENT (OUTPATIENT)
Dept: VASCULAR SURGERY | Facility: CLINIC | Age: 79
End: 2024-11-13
Payer: MEDICARE

## 2024-11-13 PROCEDURE — 93971 EXTREMITY STUDY: CPT | Mod: RT

## 2024-11-14 ENCOUNTER — APPOINTMENT (OUTPATIENT)
Dept: VASCULAR SURGERY | Facility: CLINIC | Age: 79
End: 2024-11-14
Payer: MEDICARE

## 2024-11-14 DIAGNOSIS — I83.893 VARICOSE VEINS OF BILATERAL LOWER EXTREMITIES WITH OTHER COMPLICATIONS: ICD-10-CM

## 2024-11-14 DIAGNOSIS — I87.2 VENOUS INSUFFICIENCY (CHRONIC) (PERIPHERAL): ICD-10-CM

## 2024-11-14 PROCEDURE — 36465Z: CUSTOM | Mod: RT

## 2024-11-14 RX ORDER — NYSTATIN AND TRIAMCINOLONE ACETONIDE 100000; 1 MG/G; MG/G
100000-0.1 CREAM TOPICAL 4 TIMES DAILY
Qty: 1 | Refills: 3 | Status: ACTIVE | COMMUNITY
Start: 2024-11-14 | End: 1900-01-01

## 2024-11-20 PROCEDURE — 11042 DBRDMT SUBQ TIS 1ST 20SQCM/<: CPT

## 2024-11-20 PROCEDURE — 29581 APPL MULTLAYER CMPRN SYS LEG: CPT | Mod: LT

## 2024-11-21 ENCOUNTER — APPOINTMENT (OUTPATIENT)
Dept: VASCULAR SURGERY | Facility: CLINIC | Age: 79
End: 2024-11-21
Payer: MEDICARE

## 2024-11-21 PROCEDURE — 93971 EXTREMITY STUDY: CPT | Mod: RT

## 2024-11-25 ENCOUNTER — OUTPATIENT (OUTPATIENT)
Dept: OUTPATIENT SERVICES | Facility: HOSPITAL | Age: 79
LOS: 1 days | End: 2024-11-25
Payer: MEDICARE

## 2024-11-25 ENCOUNTER — APPOINTMENT (OUTPATIENT)
Dept: WOUND CARE | Facility: HOSPITAL | Age: 79
End: 2024-11-25
Payer: MEDICARE

## 2024-11-25 VITALS
HEART RATE: 110 BPM | TEMPERATURE: 98.2 F | RESPIRATION RATE: 18 BRPM | SYSTOLIC BLOOD PRESSURE: 123 MMHG | DIASTOLIC BLOOD PRESSURE: 78 MMHG | OXYGEN SATURATION: 92 %

## 2024-11-25 DIAGNOSIS — L98.499 NON-PRESSURE CHRONIC ULCER OF SKIN OF OTHER SITES WITH UNSPECIFIED SEVERITY: ICD-10-CM
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
## 2024-11-25 DIAGNOSIS — I87.312 CHRONIC VENOUS HYPERTENSION (IDIOPATHIC) WITH ULCER OF LEFT LOWER EXTREMITY: ICD-10-CM

## 2024-11-25 DIAGNOSIS — S81.802A UNSPECIFIED OPEN WOUND, LEFT LOWER LEG, INITIAL ENCOUNTER: ICD-10-CM

## 2024-11-25 DIAGNOSIS — R60.0 LOCALIZED EDEMA: ICD-10-CM
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
## 2024-11-25 PROCEDURE — 15271 SKIN SUB GRAFT TRNK/ARM/LEG: CPT

## 2024-12-02 ENCOUNTER — OUTPATIENT (OUTPATIENT)
Dept: OUTPATIENT SERVICES | Facility: HOSPITAL | Age: 79
LOS: 1 days | End: 2024-12-02
Payer: MEDICARE

## 2024-12-02 ENCOUNTER — APPOINTMENT (OUTPATIENT)
Dept: WOUND CARE | Facility: HOSPITAL | Age: 79
End: 2024-12-02
Payer: MEDICARE

## 2024-12-02 VITALS
DIASTOLIC BLOOD PRESSURE: 66 MMHG | HEART RATE: 97 BPM | SYSTOLIC BLOOD PRESSURE: 122 MMHG | TEMPERATURE: 98.2 F | OXYGEN SATURATION: 95 % | RESPIRATION RATE: 18 BRPM

## 2024-12-02 DIAGNOSIS — R60.0 LOCALIZED EDEMA: ICD-10-CM

## 2024-12-02 DIAGNOSIS — S80.12XD CONTUSION OF LEFT LOWER LEG, SUBSEQUENT ENCOUNTER: ICD-10-CM

## 2024-12-02 DIAGNOSIS — L98.499 NON-PRESSURE CHRONIC ULCER OF SKIN OF OTHER SITES WITH UNSPECIFIED SEVERITY: ICD-10-CM

## 2024-12-02 DIAGNOSIS — I87.2 VENOUS INSUFFICIENCY (CHRONIC) (PERIPHERAL): ICD-10-CM

## 2024-12-02 DIAGNOSIS — I89.0 LYMPHEDEMA, NOT ELSEWHERE CLASSIFIED: ICD-10-CM

## 2024-12-02 PROCEDURE — 15271 SKIN SUB GRAFT TRNK/ARM/LEG: CPT

## 2024-12-04 DIAGNOSIS — I89.0 LYMPHEDEMA, NOT ELSEWHERE CLASSIFIED: ICD-10-CM

## 2024-12-04 DIAGNOSIS — I87.312 CHRONIC VENOUS HYPERTENSION (IDIOPATHIC) WITH ULCER OF LEFT LOWER EXTREMITY: ICD-10-CM

## 2024-12-11 ENCOUNTER — APPOINTMENT (OUTPATIENT)
Dept: VASCULAR SURGERY | Facility: CLINIC | Age: 79
End: 2024-12-11

## 2024-12-11 ENCOUNTER — APPOINTMENT (OUTPATIENT)
Dept: WOUND CARE | Facility: HOSPITAL | Age: 79
End: 2024-12-11
Payer: MEDICARE

## 2024-12-11 VITALS
RESPIRATION RATE: 18 BRPM | DIASTOLIC BLOOD PRESSURE: 76 MMHG | TEMPERATURE: 98.2 F | SYSTOLIC BLOOD PRESSURE: 118 MMHG | HEART RATE: 71 BPM | OXYGEN SATURATION: 91 %

## 2024-12-11 VITALS
SYSTOLIC BLOOD PRESSURE: 133 MMHG | HEIGHT: 63 IN | BODY MASS INDEX: 37.21 KG/M2 | WEIGHT: 210 LBS | DIASTOLIC BLOOD PRESSURE: 83 MMHG | HEART RATE: 71 BPM | TEMPERATURE: 98 F

## 2024-12-11 DIAGNOSIS — I83.893 VARICOSE VEINS OF BILATERAL LOWER EXTREMITIES WITH OTHER COMPLICATIONS: ICD-10-CM

## 2024-12-11 DIAGNOSIS — L97.929 CHRONIC VENOUS HYPERTENSION (IDIOPATHIC) WITH ULCER OF LEFT LOWER EXTREMITY: ICD-10-CM

## 2024-12-11 DIAGNOSIS — I87.312 CHRONIC VENOUS HYPERTENSION (IDIOPATHIC) WITH ULCER OF LEFT LOWER EXTREMITY: ICD-10-CM

## 2024-12-11 DIAGNOSIS — I87.2 VENOUS INSUFFICIENCY (CHRONIC) (PERIPHERAL): ICD-10-CM

## 2024-12-11 PROCEDURE — 99213 OFFICE O/P EST LOW 20 MIN: CPT

## 2024-12-11 PROCEDURE — 15271 SKIN SUB GRAFT TRNK/ARM/LEG: CPT

## 2024-12-17 DIAGNOSIS — I87.2 VENOUS INSUFFICIENCY (CHRONIC) (PERIPHERAL): ICD-10-CM

## 2024-12-17 DIAGNOSIS — I89.0 LYMPHEDEMA, NOT ELSEWHERE CLASSIFIED: ICD-10-CM

## 2024-12-18 ENCOUNTER — OUTPATIENT (OUTPATIENT)
Dept: OUTPATIENT SERVICES | Facility: HOSPITAL | Age: 79
LOS: 1 days | End: 2024-12-18
Payer: MEDICARE

## 2024-12-18 ENCOUNTER — APPOINTMENT (OUTPATIENT)
Dept: WOUND CARE | Facility: HOSPITAL | Age: 79
End: 2024-12-18
Payer: MEDICARE

## 2024-12-18 VITALS
BODY MASS INDEX: 37.21 KG/M2 | SYSTOLIC BLOOD PRESSURE: 115 MMHG | DIASTOLIC BLOOD PRESSURE: 67 MMHG | RESPIRATION RATE: 18 BRPM | OXYGEN SATURATION: 93 % | HEART RATE: 86 BPM | HEIGHT: 63 IN | TEMPERATURE: 98.2 F | WEIGHT: 210 LBS

## 2024-12-18 DIAGNOSIS — S81.802A UNSPECIFIED OPEN WOUND, LEFT LOWER LEG, INITIAL ENCOUNTER: ICD-10-CM

## 2024-12-18 PROCEDURE — 29581 APPL MULTLAYER CMPRN SYS LEG: CPT | Mod: LT

## 2024-12-18 PROCEDURE — 29581 APPL MULTLAYER CMPRN SYS LEG: CPT

## 2024-12-31 ENCOUNTER — APPOINTMENT (OUTPATIENT)
Dept: WOUND CARE | Facility: HOSPITAL | Age: 79
End: 2024-12-31
Payer: MEDICARE

## 2024-12-31 ENCOUNTER — OUTPATIENT (OUTPATIENT)
Dept: OUTPATIENT SERVICES | Facility: HOSPITAL | Age: 79
LOS: 1 days | End: 2024-12-31
Payer: MEDICARE

## 2024-12-31 DIAGNOSIS — I87.2 VENOUS INSUFFICIENCY (CHRONIC) (PERIPHERAL): ICD-10-CM

## 2024-12-31 DIAGNOSIS — S80.12XD CONTUSION OF LEFT LOWER LEG, SUBSEQUENT ENCOUNTER: ICD-10-CM

## 2024-12-31 DIAGNOSIS — I89.0 LYMPHEDEMA, NOT ELSEWHERE CLASSIFIED: ICD-10-CM

## 2024-12-31 PROCEDURE — 11042 DBRDMT SUBQ TIS 1ST 20SQCM/<: CPT

## 2024-12-31 PROCEDURE — 29581 APPL MULTLAYER CMPRN SYS LEG: CPT | Mod: LT
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
## 2025-01-02 DIAGNOSIS — S81.802A UNSPECIFIED OPEN WOUND, LEFT LOWER LEG, INITIAL ENCOUNTER: ICD-10-CM

## 2025-01-15 DIAGNOSIS — I87.2 VENOUS INSUFFICIENCY (CHRONIC) (PERIPHERAL): ICD-10-CM

## 2025-01-22 ENCOUNTER — APPOINTMENT (OUTPATIENT)
Dept: INTERNAL MEDICINE | Facility: CLINIC | Age: 80
End: 2025-01-22
Payer: MEDICARE

## 2025-01-22 VITALS
BODY MASS INDEX: 41.23 KG/M2 | OXYGEN SATURATION: 94 % | SYSTOLIC BLOOD PRESSURE: 144 MMHG | HEIGHT: 60 IN | TEMPERATURE: 97.7 F | WEIGHT: 210 LBS | DIASTOLIC BLOOD PRESSURE: 72 MMHG | HEART RATE: 78 BPM

## 2025-01-22 DIAGNOSIS — M25.50 PAIN IN UNSPECIFIED JOINT: ICD-10-CM

## 2025-01-22 DIAGNOSIS — M06.9 RHEUMATOID ARTHRITIS, UNSPECIFIED: ICD-10-CM

## 2025-01-22 DIAGNOSIS — Z00.00 ENCOUNTER FOR GENERAL ADULT MEDICAL EXAMINATION W/OUT ABNORMAL FINDINGS: ICD-10-CM

## 2025-01-22 DIAGNOSIS — I10 ESSENTIAL (PRIMARY) HYPERTENSION: ICD-10-CM

## 2025-01-22 DIAGNOSIS — I48.91 UNSPECIFIED ATRIAL FIBRILLATION: ICD-10-CM

## 2025-01-22 PROCEDURE — G0439: CPT

## 2025-01-23 LAB
25(OH)D3 SERPL-MCNC: 15.8 NG/ML
ALBUMIN SERPL ELPH-MCNC: 3.9 G/DL
ALP BLD-CCNC: 95 U/L
ALT SERPL-CCNC: 5 U/L
ANION GAP SERPL CALC-SCNC: 17 MMOL/L
AST SERPL-CCNC: 12 U/L
BILIRUB SERPL-MCNC: 0.3 MG/DL
BUN SERPL-MCNC: 18 MG/DL
CALCIUM SERPL-MCNC: 9.7 MG/DL
CHLORIDE SERPL-SCNC: 100 MMOL/L
CHOLEST SERPL-MCNC: 171 MG/DL
CO2 SERPL-SCNC: 23 MMOL/L
CREAT SERPL-MCNC: 0.6 MG/DL
EGFR: 90 ML/MIN/1.73M2
ESTIMATED AVERAGE GLUCOSE: 134 MG/DL
GLUCOSE SERPL-MCNC: 88 MG/DL
HBA1C MFR BLD HPLC: 6.3 %
HCT VFR BLD CALC: 38.4 %
HDLC SERPL-MCNC: 46 MG/DL
HGB BLD-MCNC: 11.8 G/DL
LDLC SERPL CALC-MCNC: 93 MG/DL
MAGNESIUM SERPL-MCNC: 2.1 MG/DL
MCHC RBC-ENTMCNC: 28.9 PG
MCHC RBC-ENTMCNC: 30.7 G/DL
MCV RBC AUTO: 94.1 FL
NONHDLC SERPL-MCNC: 125 MG/DL
PHOSPHATE SERPL-MCNC: 3.2 MG/DL
PLATELET # BLD AUTO: 347 K/UL
POTASSIUM SERPL-SCNC: 4.2 MMOL/L
PROT SERPL-MCNC: 7.8 G/DL
RBC # BLD: 4.08 M/UL
RBC # FLD: 14.6 %
RHEUMATOID FACT SER QL: 251 IU/ML
SODIUM SERPL-SCNC: 140 MMOL/L
T4 FREE SERPL-MCNC: 1.2 NG/DL
TRIGL SERPL-MCNC: 189 MG/DL
TSH SERPL-ACNC: 2.63 UIU/ML
URATE SERPL-MCNC: 7.3 MG/DL
WBC # FLD AUTO: 7.25 K/UL

## 2025-01-28 ENCOUNTER — OUTPATIENT (OUTPATIENT)
Dept: OUTPATIENT SERVICES | Facility: HOSPITAL | Age: 80
LOS: 1 days | End: 2025-01-28
Payer: MEDICARE

## 2025-01-28 ENCOUNTER — APPOINTMENT (OUTPATIENT)
Dept: WOUND CARE | Facility: HOSPITAL | Age: 80
End: 2025-01-28
Payer: MEDICARE

## 2025-01-28 VITALS
HEART RATE: 77 BPM | RESPIRATION RATE: 18 BRPM | SYSTOLIC BLOOD PRESSURE: 114 MMHG | TEMPERATURE: 98.1 F | DIASTOLIC BLOOD PRESSURE: 65 MMHG | OXYGEN SATURATION: 90 %

## 2025-01-28 DIAGNOSIS — I89.0 LYMPHEDEMA, NOT ELSEWHERE CLASSIFIED: ICD-10-CM

## 2025-01-28 DIAGNOSIS — I87.2 VENOUS INSUFFICIENCY (CHRONIC) (PERIPHERAL): ICD-10-CM

## 2025-01-28 DIAGNOSIS — S81.802A UNSPECIFIED OPEN WOUND, LEFT LOWER LEG, INITIAL ENCOUNTER: ICD-10-CM

## 2025-01-28 PROCEDURE — 11042 DBRDMT SUBQ TIS 1ST 20SQCM/<: CPT

## 2025-01-28 PROCEDURE — G0463: CPT

## 2025-01-28 PROCEDURE — 29581 APPL MULTLAYER CMPRN SYS LEG: CPT

## 2025-01-28 PROCEDURE — 29581 APPL MULTLAYER CMPRN SYS LEG: CPT | Mod: LT

## 2025-02-04 DIAGNOSIS — I89.0 LYMPHEDEMA, NOT ELSEWHERE CLASSIFIED: ICD-10-CM

## 2025-02-04 DIAGNOSIS — S81.802A UNSPECIFIED OPEN WOUND, LEFT LOWER LEG, INITIAL ENCOUNTER: ICD-10-CM
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
## 2025-02-18 ENCOUNTER — APPOINTMENT (OUTPATIENT)
Dept: WOUND CARE | Facility: HOSPITAL | Age: 80
End: 2025-02-18
Payer: MEDICARE

## 2025-02-18 ENCOUNTER — OUTPATIENT (OUTPATIENT)
Dept: OUTPATIENT SERVICES | Facility: HOSPITAL | Age: 80
LOS: 1 days | End: 2025-02-18
Payer: MEDICARE

## 2025-02-18 VITALS
TEMPERATURE: 98 F | RESPIRATION RATE: 18 BRPM | OXYGEN SATURATION: 93 % | DIASTOLIC BLOOD PRESSURE: 71 MMHG | HEART RATE: 104 BPM | SYSTOLIC BLOOD PRESSURE: 160 MMHG

## 2025-02-18 DIAGNOSIS — S81.802A UNSPECIFIED OPEN WOUND, LEFT LOWER LEG, INITIAL ENCOUNTER: ICD-10-CM

## 2025-02-18 DIAGNOSIS — I87.2 VENOUS INSUFFICIENCY (CHRONIC) (PERIPHERAL): ICD-10-CM

## 2025-02-18 DIAGNOSIS — I89.0 LYMPHEDEMA, NOT ELSEWHERE CLASSIFIED: ICD-10-CM

## 2025-02-18 PROCEDURE — 29581 APPL MULTLAYER CMPRN SYS LEG: CPT | Mod: LT

## 2025-02-18 PROCEDURE — 15271 SKIN SUB GRAFT TRNK/ARM/LEG: CPT

## 2025-02-26 DIAGNOSIS — I89.0 LYMPHEDEMA, NOT ELSEWHERE CLASSIFIED: ICD-10-CM
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
## 2025-02-26 DIAGNOSIS — S81.802A UNSPECIFIED OPEN WOUND, LEFT LOWER LEG, INITIAL ENCOUNTER: ICD-10-CM

## 2025-02-26 DIAGNOSIS — I87.2 VENOUS INSUFFICIENCY (CHRONIC) (PERIPHERAL): ICD-10-CM

## 2025-03-18 ENCOUNTER — OUTPATIENT (OUTPATIENT)
Dept: OUTPATIENT SERVICES | Facility: HOSPITAL | Age: 80
LOS: 1 days | End: 2025-03-18
Payer: MEDICARE

## 2025-03-18 ENCOUNTER — APPOINTMENT (OUTPATIENT)
Dept: WOUND CARE | Facility: HOSPITAL | Age: 80
End: 2025-03-18
Payer: MEDICARE

## 2025-03-18 VITALS
TEMPERATURE: 98.3 F | HEART RATE: 87 BPM | WEIGHT: 210 LBS | RESPIRATION RATE: 18 BRPM | DIASTOLIC BLOOD PRESSURE: 77 MMHG | BODY MASS INDEX: 41.23 KG/M2 | SYSTOLIC BLOOD PRESSURE: 115 MMHG | HEIGHT: 60 IN | OXYGEN SATURATION: 93 %

## 2025-03-18 DIAGNOSIS — I89.0 LYMPHEDEMA, NOT ELSEWHERE CLASSIFIED: ICD-10-CM

## 2025-03-18 DIAGNOSIS — I87.312 CHRONIC VENOUS HYPERTENSION (IDIOPATHIC) WITH ULCER OF LEFT LOWER EXTREMITY: ICD-10-CM

## 2025-03-18 DIAGNOSIS — L97.929 CHRONIC VENOUS HYPERTENSION (IDIOPATHIC) WITH ULCER OF LEFT LOWER EXTREMITY: ICD-10-CM

## 2025-03-18 PROCEDURE — 11042 DBRDMT SUBQ TIS 1ST 20SQCM/<: CPT

## 2025-03-18 PROCEDURE — 29581 APPL MULTLAYER CMPRN SYS LEG: CPT

## 2025-03-18 PROCEDURE — 29581 APPL MULTLAYER CMPRN SYS LEG: CPT | Mod: LT

## 2025-04-02 DIAGNOSIS — L97.929 NON-PRESSURE CHRONIC ULCER OF UNSPECIFIED PART OF LEFT LOWER LEG WITH UNSPECIFIED SEVERITY: ICD-10-CM

## 2025-04-02 DIAGNOSIS — I87.312 CHRONIC VENOUS HYPERTENSION (IDIOPATHIC) WITH ULCER OF LEFT LOWER EXTREMITY: ICD-10-CM

## 2025-04-15 ENCOUNTER — APPOINTMENT (OUTPATIENT)
Dept: WOUND CARE | Facility: HOSPITAL | Age: 80
End: 2025-04-15
Payer: MEDICARE

## 2025-04-15 ENCOUNTER — OUTPATIENT (OUTPATIENT)
Dept: OUTPATIENT SERVICES | Facility: HOSPITAL | Age: 80
LOS: 1 days | End: 2025-04-15
Payer: MEDICARE

## 2025-04-15 DIAGNOSIS — I87.2 VENOUS INSUFFICIENCY (CHRONIC) (PERIPHERAL): ICD-10-CM

## 2025-04-15 DIAGNOSIS — S81.802A UNSPECIFIED OPEN WOUND, LEFT LOWER LEG, INITIAL ENCOUNTER: ICD-10-CM

## 2025-04-15 DIAGNOSIS — I89.0 LYMPHEDEMA, NOT ELSEWHERE CLASSIFIED: ICD-10-CM

## 2025-04-15 PROCEDURE — 11042 DBRDMT SUBQ TIS 1ST 20SQCM/<: CPT

## 2025-04-15 PROCEDURE — 29581 APPL MULTLAYER CMPRN SYS LEG: CPT | Mod: LT

## 2025-04-15 RX ORDER — CEPHALEXIN 500 MG/1
500 CAPSULE ORAL 3 TIMES DAILY
Qty: 15 | Refills: 0 | Status: ACTIVE | COMMUNITY
Start: 2025-04-15 | End: 1900-01-01
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
## 2025-04-23 DIAGNOSIS — I87.2 VENOUS INSUFFICIENCY (CHRONIC) (PERIPHERAL): ICD-10-CM
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
## 2025-04-23 DIAGNOSIS — S81.802A UNSPECIFIED OPEN WOUND, LEFT LOWER LEG, INITIAL ENCOUNTER: ICD-10-CM

## 2025-04-28 DIAGNOSIS — J04.0 ACUTE LARYNGITIS: ICD-10-CM

## 2025-04-28 RX ORDER — AZITHROMYCIN 250 MG/1
250 TABLET, FILM COATED ORAL
Qty: 1 | Refills: 1 | Status: ACTIVE | COMMUNITY
Start: 2025-04-28 | End: 1900-01-01

## 2025-05-13 ENCOUNTER — OUTPATIENT (OUTPATIENT)
Dept: OUTPATIENT SERVICES | Facility: HOSPITAL | Age: 80
LOS: 1 days | End: 2025-05-13
Payer: MEDICARE

## 2025-05-13 ENCOUNTER — APPOINTMENT (OUTPATIENT)
Dept: WOUND CARE | Facility: HOSPITAL | Age: 80
End: 2025-05-13
Payer: MEDICARE

## 2025-05-13 DIAGNOSIS — I89.0 LYMPHEDEMA, NOT ELSEWHERE CLASSIFIED: ICD-10-CM

## 2025-05-13 DIAGNOSIS — L97.929 CHRONIC VENOUS HYPERTENSION (IDIOPATHIC) WITH ULCER OF LEFT LOWER EXTREMITY: ICD-10-CM

## 2025-05-13 DIAGNOSIS — I87.312 CHRONIC VENOUS HYPERTENSION (IDIOPATHIC) WITH ULCER OF LEFT LOWER EXTREMITY: ICD-10-CM

## 2025-05-13 DIAGNOSIS — L03.115 CELLULITIS OF RIGHT LOWER LIMB: ICD-10-CM

## 2025-05-13 PROCEDURE — 11042 DBRDMT SUBQ TIS 1ST 20SQCM/<: CPT

## 2025-05-13 PROCEDURE — 29581 APPL MULTLAYER CMPRN SYS LEG: CPT | Mod: LT

## 2025-05-13 RX ORDER — CEPHALEXIN 500 MG/1
500 CAPSULE ORAL 3 TIMES DAILY
Qty: 9 | Refills: 0 | Status: ACTIVE | COMMUNITY
Start: 2025-05-13 | End: 1900-01-01

## 2025-05-14 ENCOUNTER — APPOINTMENT (OUTPATIENT)
Dept: INTERNAL MEDICINE | Facility: CLINIC | Age: 80
End: 2025-05-14
Payer: MEDICARE

## 2025-05-14 DIAGNOSIS — J04.0 ACUTE LARYNGITIS: ICD-10-CM

## 2025-05-14 PROCEDURE — 99213 OFFICE O/P EST LOW 20 MIN: CPT | Mod: 2W

## 2025-05-14 RX ORDER — PANTOPRAZOLE 40 MG/1
40 TABLET, DELAYED RELEASE ORAL
Qty: 1 | Refills: 0 | Status: ACTIVE | COMMUNITY
Start: 2025-05-14 | End: 1900-01-01

## 2025-05-21 DIAGNOSIS — I87.312 CHRONIC VENOUS HYPERTENSION (IDIOPATHIC) WITH ULCER OF LEFT LOWER EXTREMITY: ICD-10-CM

## 2025-05-21 DIAGNOSIS — L97.929 NON-PRESSURE CHRONIC ULCER OF UNSPECIFIED PART OF LEFT LOWER LEG WITH UNSPECIFIED SEVERITY: ICD-10-CM

## 2025-05-22 RX ORDER — METOPROLOL SUCCINATE 100 MG/1
100 TABLET, EXTENDED RELEASE ORAL DAILY
Qty: 30 | Refills: 0 | Status: ACTIVE | COMMUNITY
Start: 2025-05-22 | End: 1900-01-01

## 2025-06-10 ENCOUNTER — APPOINTMENT (OUTPATIENT)
Dept: WOUND CARE | Facility: HOSPITAL | Age: 80
End: 2025-06-10
Payer: MEDICARE

## 2025-06-10 ENCOUNTER — OUTPATIENT (OUTPATIENT)
Dept: OUTPATIENT SERVICES | Facility: HOSPITAL | Age: 80
LOS: 1 days | End: 2025-06-10
Payer: MEDICARE

## 2025-06-10 VITALS
TEMPERATURE: 97.7 F | OXYGEN SATURATION: 90 % | SYSTOLIC BLOOD PRESSURE: 130 MMHG | RESPIRATION RATE: 18 BRPM | DIASTOLIC BLOOD PRESSURE: 79 MMHG | HEART RATE: 83 BPM

## 2025-06-10 PROCEDURE — 29581 APPL MULTLAYER CMPRN SYS LEG: CPT | Mod: LT

## 2025-06-10 PROCEDURE — 11042 DBRDMT SUBQ TIS 1ST 20SQCM/<: CPT

## 2025-06-24 DIAGNOSIS — I87.2 VENOUS INSUFFICIENCY (CHRONIC) (PERIPHERAL): ICD-10-CM
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
## 2025-06-24 DIAGNOSIS — S81.802A UNSPECIFIED OPEN WOUND, LEFT LOWER LEG, INITIAL ENCOUNTER: ICD-10-CM

## 2025-06-24 DIAGNOSIS — I89.0 LYMPHEDEMA, NOT ELSEWHERE CLASSIFIED: ICD-10-CM
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
## 2025-07-08 ENCOUNTER — APPOINTMENT (OUTPATIENT)
Dept: WOUND CARE | Facility: HOSPITAL | Age: 80
End: 2025-07-08
Payer: MEDICARE

## 2025-07-08 ENCOUNTER — OUTPATIENT (OUTPATIENT)
Dept: OUTPATIENT SERVICES | Facility: HOSPITAL | Age: 80
LOS: 1 days | End: 2025-07-08
Payer: MEDICARE

## 2025-07-08 PROCEDURE — 29581 APPL MULTLAYER CMPRN SYS LEG: CPT | Mod: LT

## 2025-07-08 PROCEDURE — 11042 DBRDMT SUBQ TIS 1ST 20SQCM/<: CPT

## 2025-07-08 PROCEDURE — 93970 EXTREMITY STUDY: CPT | Mod: 26

## 2025-07-08 PROCEDURE — 93970 EXTREMITY STUDY: CPT

## 2025-07-14 ENCOUNTER — NON-APPOINTMENT (OUTPATIENT)
Age: 80
End: 2025-07-14

## 2025-07-14 DIAGNOSIS — L97.929 NON-PRESSURE CHRONIC ULCER OF UNSPECIFIED PART OF LEFT LOWER LEG WITH UNSPECIFIED SEVERITY: ICD-10-CM

## 2025-07-14 DIAGNOSIS — I89.0 LYMPHEDEMA, NOT ELSEWHERE CLASSIFIED: ICD-10-CM

## 2025-07-14 DIAGNOSIS — I83.893 VARICOSE VEINS OF BILATERAL LOWER EXTREMITIES WITH OTHER COMPLICATIONS: ICD-10-CM

## 2025-07-14 DIAGNOSIS — I87.312 CHRONIC VENOUS HYPERTENSION (IDIOPATHIC) WITH ULCER OF LEFT LOWER EXTREMITY: ICD-10-CM

## 2025-07-29 ENCOUNTER — OUTPATIENT (OUTPATIENT)
Dept: OUTPATIENT SERVICES | Facility: HOSPITAL | Age: 80
LOS: 1 days | End: 2025-07-29
Payer: MEDICARE

## 2025-07-29 ENCOUNTER — APPOINTMENT (OUTPATIENT)
Dept: WOUND CARE | Facility: HOSPITAL | Age: 80
End: 2025-07-29
Payer: MEDICARE

## 2025-07-29 VITALS — WEIGHT: 200 LBS | HEIGHT: 64 IN | BODY MASS INDEX: 34.15 KG/M2

## 2025-07-29 VITALS
TEMPERATURE: 98.1 F | SYSTOLIC BLOOD PRESSURE: 121 MMHG | DIASTOLIC BLOOD PRESSURE: 64 MMHG | RESPIRATION RATE: 18 BRPM | OXYGEN SATURATION: 89 % | HEART RATE: 101 BPM

## 2025-07-29 DIAGNOSIS — S81.802A UNSPECIFIED OPEN WOUND, LEFT LOWER LEG, INITIAL ENCOUNTER: ICD-10-CM

## 2025-07-29 DIAGNOSIS — S80.12XD CONTUSION OF LEFT LOWER LEG, SUBSEQUENT ENCOUNTER: ICD-10-CM

## 2025-07-29 DIAGNOSIS — I87.2 VENOUS INSUFFICIENCY (CHRONIC) (PERIPHERAL): ICD-10-CM

## 2025-07-29 PROCEDURE — 11042 DBRDMT SUBQ TIS 1ST 20SQCM/<: CPT

## 2025-07-29 PROCEDURE — 29581 APPL MULTLAYER CMPRN SYS LEG: CPT | Mod: LT

## 2025-08-06 DIAGNOSIS — I87.2 VENOUS INSUFFICIENCY (CHRONIC) (PERIPHERAL): ICD-10-CM
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
## 2025-08-06 DIAGNOSIS — S80.12XD CONTUSION OF LEFT LOWER LEG, SUBSEQUENT ENCOUNTER: ICD-10-CM

## 2025-08-13 ENCOUNTER — APPOINTMENT (OUTPATIENT)
Dept: INTERNAL MEDICINE | Facility: CLINIC | Age: 80
End: 2025-08-13

## 2025-08-26 ENCOUNTER — APPOINTMENT (OUTPATIENT)
Dept: WOUND CARE | Facility: HOSPITAL | Age: 80
End: 2025-08-26
Payer: MEDICARE

## 2025-08-26 ENCOUNTER — OUTPATIENT (OUTPATIENT)
Dept: OUTPATIENT SERVICES | Facility: HOSPITAL | Age: 80
LOS: 1 days | End: 2025-08-26
Payer: MEDICARE

## 2025-08-26 VITALS
TEMPERATURE: 98.2 F | SYSTOLIC BLOOD PRESSURE: 121 MMHG | RESPIRATION RATE: 18 BRPM | DIASTOLIC BLOOD PRESSURE: 80 MMHG | HEART RATE: 89 BPM | OXYGEN SATURATION: 94 %

## 2025-08-26 DIAGNOSIS — I89.0 LYMPHEDEMA, NOT ELSEWHERE CLASSIFIED: ICD-10-CM

## 2025-08-26 DIAGNOSIS — I87.2 VENOUS INSUFFICIENCY (CHRONIC) (PERIPHERAL): ICD-10-CM

## 2025-08-26 DIAGNOSIS — S81.802A UNSPECIFIED OPEN WOUND, LEFT LOWER LEG, INITIAL ENCOUNTER: ICD-10-CM

## 2025-08-26 PROCEDURE — 29581 APPL MULTLAYER CMPRN SYS LEG: CPT | Mod: LT

## 2025-08-26 PROCEDURE — 93922 UPR/L XTREMITY ART 2 LEVELS: CPT

## 2025-08-26 PROCEDURE — 11042 DBRDMT SUBQ TIS 1ST 20SQCM/<: CPT

## 2025-08-26 PROCEDURE — 93922 UPR/L XTREMITY ART 2 LEVELS: CPT | Mod: 26
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
## 2025-09-04 DIAGNOSIS — I87.2 VENOUS INSUFFICIENCY (CHRONIC) (PERIPHERAL): ICD-10-CM

## 2025-09-04 DIAGNOSIS — S81.802A UNSPECIFIED OPEN WOUND, LEFT LOWER LEG, INITIAL ENCOUNTER: ICD-10-CM
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
## 2025-09-04 DIAGNOSIS — I89.0 LYMPHEDEMA, NOT ELSEWHERE CLASSIFIED: ICD-10-CM

## 2025-09-04 DIAGNOSIS — I77.1 STRICTURE OF ARTERY: ICD-10-CM
